# Patient Record
Sex: FEMALE | Race: WHITE | Employment: OTHER | URBAN - METROPOLITAN AREA
[De-identification: names, ages, dates, MRNs, and addresses within clinical notes are randomized per-mention and may not be internally consistent; named-entity substitution may affect disease eponyms.]

---

## 2017-03-17 LAB — LDL-C, EXTERNAL: 63

## 2018-05-15 LAB — HBA1C MFR BLD HPLC: 5.4 %

## 2020-02-07 ENCOUNTER — HOSPITAL ENCOUNTER (OUTPATIENT)
Dept: MAMMOGRAPHY | Age: 70
Discharge: HOME | End: 2020-02-07
Attending: OBSTETRICS & GYNECOLOGY
Payer: COMMERCIAL

## 2020-02-07 DIAGNOSIS — R92.8 ABNORMAL MAMMOGRAM: ICD-10-CM

## 2020-02-07 PROCEDURE — 76642 ULTRASOUND BREAST LIMITED: CPT

## 2020-02-07 PROCEDURE — 77061 BREAST TOMOSYNTHESIS UNI: CPT

## 2020-02-07 PROCEDURE — G0279 TOMOSYNTHESIS, MAMMO: HCPCS

## 2020-07-23 RX ORDER — ATENOLOL 100 MG/1
TABLET ORAL
Qty: 90 TAB | Refills: 1 | Status: SHIPPED | OUTPATIENT
Start: 2020-07-23 | End: 2020-11-17

## 2020-08-14 RX ORDER — LEVOTHYROXINE SODIUM 50 UG/1
TABLET ORAL
Qty: 90 TAB | Refills: 0 | Status: SHIPPED | OUTPATIENT
Start: 2020-08-14 | End: 2020-10-18

## 2020-08-14 RX ORDER — LEVOTHYROXINE SODIUM 50 UG/1
TABLET ORAL
Qty: 30 TAB | Refills: 0 | Status: SHIPPED | OUTPATIENT
Start: 2020-08-14 | End: 2020-11-17

## 2020-08-21 ENCOUNTER — TELEPHONE (OUTPATIENT)
Dept: FAMILY MEDICINE CLINIC | Age: 70
End: 2020-08-21

## 2020-08-24 NOTE — TELEPHONE ENCOUNTER
I made a notation on her chart. Unfortunately, Epic orders all meds as brand and generic name.  She may need to discuss this with her pharmacist.

## 2020-08-25 ENCOUNTER — TELEPHONE (OUTPATIENT)
Dept: FAMILY MEDICINE CLINIC | Age: 70
End: 2020-08-25

## 2020-08-25 NOTE — TELEPHONE ENCOUNTER
Please return patients call she is irritated. She did speak to pharmacy about having brand names only.

## 2020-10-17 VITALS
OXYGEN SATURATION: 99 % | BODY MASS INDEX: 27.4 KG/M2 | WEIGHT: 145.13 LBS | HEART RATE: 76 BPM | HEIGHT: 61 IN | TEMPERATURE: 97.9 F | DIASTOLIC BLOOD PRESSURE: 84 MMHG | SYSTOLIC BLOOD PRESSURE: 128 MMHG

## 2020-10-17 RX ORDER — ESTRADIOL 0.04 MG/D
1 FILM, EXTENDED RELEASE TRANSDERMAL
COMMUNITY
Start: 2017-07-10

## 2020-10-17 RX ORDER — HYDROCHLOROTHIAZIDE 25 MG/1
12.5 TABLET ORAL
COMMUNITY
Start: 2017-08-27 | End: 2020-11-17

## 2020-10-17 RX ORDER — GABAPENTIN 300 MG/1
CAPSULE ORAL
COMMUNITY
End: 2020-10-20

## 2020-10-18 RX ORDER — LEVOTHYROXINE SODIUM 50 UG/1
TABLET ORAL
Qty: 90 TAB | Refills: 0 | Status: SHIPPED | OUTPATIENT
Start: 2020-10-18 | End: 2020-10-20 | Stop reason: SDUPTHER

## 2020-10-20 ENCOUNTER — TELEPHONE (OUTPATIENT)
Dept: FAMILY MEDICINE CLINIC | Age: 70
End: 2020-10-20

## 2020-10-20 ENCOUNTER — OFFICE VISIT (OUTPATIENT)
Dept: FAMILY MEDICINE CLINIC | Age: 70
End: 2020-10-20
Payer: COMMERCIAL

## 2020-10-20 VITALS
BODY MASS INDEX: 27.56 KG/M2 | HEART RATE: 77 BPM | TEMPERATURE: 97 F | DIASTOLIC BLOOD PRESSURE: 72 MMHG | HEIGHT: 61 IN | OXYGEN SATURATION: 98 % | SYSTOLIC BLOOD PRESSURE: 140 MMHG | WEIGHT: 146 LBS

## 2020-10-20 DIAGNOSIS — I10 ESSENTIAL HYPERTENSION: ICD-10-CM

## 2020-10-20 DIAGNOSIS — E03.9 ACQUIRED HYPOTHYROIDISM: ICD-10-CM

## 2020-10-20 DIAGNOSIS — Z11.59 SCREENING FOR VIRAL DISEASE: ICD-10-CM

## 2020-10-20 DIAGNOSIS — E55.9 VITAMIN D DEFICIENCY: ICD-10-CM

## 2020-10-20 DIAGNOSIS — Z23 ENCOUNTER FOR IMMUNIZATION: ICD-10-CM

## 2020-10-20 DIAGNOSIS — E66.3 OVERWEIGHT WITH BODY MASS INDEX (BMI) OF 28 TO 28.9 IN ADULT: ICD-10-CM

## 2020-10-20 DIAGNOSIS — Z13.220 SCREENING FOR HYPERLIPIDEMIA: ICD-10-CM

## 2020-10-20 DIAGNOSIS — Z28.20 VACCINE REFUSED BY PATIENT: ICD-10-CM

## 2020-10-20 DIAGNOSIS — E78.00 PURE HYPERCHOLESTEROLEMIA: ICD-10-CM

## 2020-10-20 DIAGNOSIS — Z00.00 WELLNESS EXAMINATION: Primary | ICD-10-CM

## 2020-10-20 PROCEDURE — 90674 CCIIV4 VAC NO PRSV 0.5 ML IM: CPT | Performed by: NURSE PRACTITIONER

## 2020-10-20 PROCEDURE — 90471 IMMUNIZATION ADMIN: CPT | Performed by: NURSE PRACTITIONER

## 2020-10-20 PROCEDURE — 99397 PER PM REEVAL EST PAT 65+ YR: CPT | Performed by: NURSE PRACTITIONER

## 2020-10-20 RX ORDER — CHROMIUM PICOLINATE 200 MCG
1 TABLET ORAL DAILY
COMMUNITY
End: 2021-03-09

## 2020-10-20 RX ORDER — METHOCARBAMOL 500 MG/1
750 TABLET, FILM COATED ORAL
COMMUNITY
Start: 2020-04-09 | End: 2021-03-09

## 2020-10-20 RX ORDER — ACETAMINOPHEN 500 MG
1 TABLET ORAL AS NEEDED
COMMUNITY
End: 2021-10-21

## 2020-10-20 RX ORDER — CELECOXIB 100 MG/1
100 CAPSULE ORAL 2 TIMES DAILY
COMMUNITY
End: 2021-03-24

## 2020-10-20 NOTE — TELEPHONE ENCOUNTER
Pt called office and stated that this afternoon she was bit and scratched by her pet cat on her arm. She said that she cleaned the wound with alcohol, and put antibiotic ointment and a bandage on the wounds. She said that the cat is up to date on his shots. Advised pt just to watch it and when showers tomorrow change bandage and allow soap and water to run into wound. Advised her to contact office if notices any changes.

## 2020-10-20 NOTE — PATIENT INSTRUCTIONS
Well Visit, Women 48 to 72: Care Instructions Your Care Instructions Physical exams can help you stay healthy. Your doctor has checked your overall health and may have suggested ways to take good care of yourself. He or she also may have recommended tests. At home, you can help prevent illness with healthy eating, regular exercise, and other steps. Follow-up care is a key part of your treatment and safety. Be sure to make and go to all appointments, and call your doctor if you are having problems. It's also a good idea to know your test results and keep a list of the medicines you take. How can you care for yourself at home? · Reach and stay at a healthy weight. This will lower your risk for many problems, such as obesity, diabetes, heart disease, and high blood pressure. · Get at least 30 minutes of exercise on most days of the week. Walking is a good choice. You also may want to do other activities, such as running, swimming, cycling, or playing tennis or team sports. · Do not smoke. Smoking can make health problems worse. If you need help quitting, talk to your doctor about stop-smoking programs and medicines. These can increase your chances of quitting for good. · Protect your skin from too much sun. When you're outdoors from 10 a.m. to 4 p.m., stay in the shade or cover up with clothing and a hat with a wide brim. Wear sunglasses that block UV rays. Even when it's cloudy, put broad-spectrum sunscreen (SPF 30 or higher) on any exposed skin. · See a dentist one or two times a year for checkups and to have your teeth cleaned. · Wear a seat belt in the car. Follow your doctor's advice about when to have certain tests. These tests can spot problems early. · Cholesterol. Your doctor will tell you how often to have this done based on your age, family history, or other things that can increase your risk for heart attack and stroke. · Blood pressure. Have your blood pressure checked during a routine doctor visit. Your doctor will tell you how often to check your blood pressure based on your age, your blood pressure results, and other factors. · Mammogram. Ask your doctor how often you should have a mammogram, which is an X-ray of your breasts. A mammogram can spot breast cancer before it can be felt and when it is easiest to treat. · Pap test and pelvic exam. Ask your doctor how often you should have a Pap test. You may not need to have a Pap test as often as you used to. · Vision. Have your eyes checked every year or two or as often as your doctor suggests. Some experts recommend that you have yearly exams for glaucoma and other age-related eye problems starting at age 48. · Hearing. Tell your doctor if you notice any change in your hearing. You can have tests to find out how well you hear. · Diabetes. Ask your doctor whether you should have tests for diabetes. · Colorectal cancer. Your risk for colorectal cancer gets higher as you get older. Some experts say that adults should start regular screening at age 48 and stop at age 76. Others say to start before age 48 or continue after age 76. Talk with your doctor about your risk and when to start and stop screening. · Thyroid disease. Talk to your doctor about whether to have your thyroid checked as part of a regular physical exam. Women have an increased chance of a thyroid problem. · Osteoporosis. You should begin tests for bone density at age 72. If you are younger than 72, ask your doctor whether you have factors that may increase your risk for this disease. You may want to have this test before age 72. · Heart attack and stroke risk. At least every 4 to 6 years, you should have your risk for heart attack and stroke assessed.  Your doctor uses factors such as your age, blood pressure, cholesterol, and whether you smoke or have diabetes to show what your risk for a heart attack or stroke is over the next 10 years. When should you call for help? Watch closely for changes in your health, and be sure to contact your doctor if you have any problems or symptoms that concern you. Where can you learn more? Go to http://www.gray.com/ Enter A831 in the search box to learn more about \"Well Visit, Women 50 to 72: Care Instructions. \" Current as of: May 27, 2020               Content Version: 12.6 © 8477-4248 [employer]. Care instructions adapted under license by [employer] (which disclaims liability or warranty for this information). If you have questions about a medical condition or this instruction, always ask your healthcare professional. [address] any warranty or liability for your use of this information.

## 2020-10-20 NOTE — PROGRESS NOTES
Subjective  Chief Complaint   Patient presents with    Annual Wellness Visit     HPI:  Desire Mack is a 69 y.o. female. Patient presents for wellness, fasting labs, and management of HTN, hypercholesterolemia, and hypothyroidism. Follows with orthopedics following spinal surgery in February. Continues with PT.   HTN- Atenolol and HCTZ daily. Does not check home BP readings, has a cuff. Limits salt intake and does not stay well hydrated. Exercise is limited due to mobility following spine surgery. Hypercholesterolemia- manages with diet, limits fat/cholesterol. Hypothyroidism- Synthroid daily in am alone with a sip of water. Vit D- continues to take daily supplement.       Immunizations:  Flu: due now  Tetanus:   Shingrix: due  Pneumovax 23: declines  Prevnar 13: declines    LMP: Hysterectomy  HCV screening: No record of previous screening  Pap: scheduled 2021  Mammo: 2020, scheduled 2021  Colon cancer screenin  Dexa: declines today    Alcohol use: rarely  Smoking status: former  Low dose CT scan: not indicated    Moods: expresses frustration with delayed healing and difficulty completing ADLs, denies medication  Diet: not eating a healthy diet  Exercise: limited due to spine surgery  Vision exams: annual, overdue  Dental exams: every 6 months, overdue      Past Medical History:   Diagnosis Date    Acquired hypothyroidism     Essential hypertension     Pure hypercholesterolemia     Vitamin D deficiency      Family History   Problem Relation Age of Onset    Breast Cancer Mother 80    Hypertension Mother     Colon Cancer Father      Social History     Socioeconomic History    Marital status:      Spouse name: Not on file    Number of children: Not on file    Years of education: Not on file    Highest education level: Not on file   Occupational History    Not on file   Social Needs    Financial resource strain: Not on file    Food insecurity     Worry: Not on file Inability: Not on file    Transportation needs     Medical: Not on file     Non-medical: Not on file   Tobacco Use    Smoking status: Former Smoker     Packs/day: 1.00     Years: 35.00     Pack years: 35.00    Smokeless tobacco: Never Used    Tobacco comment: Quit 4/1999   Substance and Sexual Activity    Alcohol use: Yes     Comment: ocassion    Drug use: Never    Sexual activity: Not on file   Lifestyle    Physical activity     Days per week: Not on file     Minutes per session: Not on file    Stress: Not on file   Relationships    Social connections     Talks on phone: Not on file     Gets together: Not on file     Attends Gnosticism service: Not on file     Active member of club or organization: Not on file     Attends meetings of clubs or organizations: Not on file     Relationship status: Not on file    Intimate partner violence     Fear of current or ex partner: Not on file     Emotionally abused: Not on file     Physically abused: Not on file     Forced sexual activity: Not on file   Other Topics Concern    Not on file   Social History Narrative    Not on file     Current Outpatient Medications on File Prior to Visit   Medication Sig Dispense Refill    methocarbamoL (ROBAXIN) 500 mg tablet Take 500 mg by mouth every eight (8) hours as needed.  celecoxib (CeleBREX) 100 mg capsule Take 100 mg by mouth two (2) times a day.  cholecalciferol (VITAMIN D3) (2,000 UNITS /50 MCG) cap capsule Take 1 Cap by mouth two (2) times a day.  Calcium-Cholecalciferol, D3, 600 mg(1,500mg) -400 unit cap Take 1 Tab by mouth daily.  estradioL (VIVELLE) 0.0375 mg/24 hr Minivelle 0.0375 mg/24 hr transdermal patch      hydroCHLOROthiazide (HYDRODIURIL) 25 mg tablet Take 12.5 mg by mouth.       synthroid 50 mcg tablet TAKE 1 TABLET BY MOUTH EVERY DAY (OVERDUE FOR LABS) 30 Tab 0    atenoloL (TENORMIN) 100 mg tablet TAKE 1 TABLET BY MOUTH EVERY DAY 90 Tab 1    [DISCONTINUED] synthroid 50 mcg tablet TAKE 1 TABLET BY MOUTH EVERY DAY 90 Tab 0    [DISCONTINUED] gabapentin (NEURONTIN) 300 mg capsule gabapentin 300 mg capsule       No current facility-administered medications on file prior to visit. Allergies   Allergen Reactions    Nitrofurantoin Nausea and Vomiting     Review of Systems   Constitutional: Negative for chills, fever and weight loss. HENT: Negative for congestion, ear pain, hearing loss, sinus pain and sore throat. Denies difficulty swallowing. Eyes: Negative for blurred vision. Respiratory: Negative for cough, shortness of breath and wheezing. Cardiovascular: Negative for chest pain, palpitations, claudication and leg swelling. Gastrointestinal: Negative for abdominal pain, constipation, diarrhea and heartburn. Genitourinary: Negative for dysuria. Musculoskeletal: Positive for back pain and joint pain. Negative for myalgias. Neurological: Positive for weakness (generalized). Negative for dizziness, tingling and headaches. Psychiatric/Behavioral: Negative for depression. The patient is not nervous/anxious. Objective  Physical Exam  Vitals signs and nursing note reviewed. Constitutional:       General: She is not in acute distress. Appearance: Normal appearance. She is overweight. HENT:      Head: Normocephalic. Right Ear: Tympanic membrane normal.      Left Ear: Tympanic membrane normal.      Nose: Nose normal.      Mouth/Throat:      Mouth: Mucous membranes are moist.      Pharynx: Oropharynx is clear. No posterior oropharyngeal erythema. Eyes:      Extraocular Movements: Extraocular movements intact. Neck:      Musculoskeletal: Normal range of motion and neck supple. Thyroid: No thyroid mass, thyromegaly or thyroid tenderness. Cardiovascular:      Rate and Rhythm: Normal rate and regular rhythm. Heart sounds: Normal heart sounds.    Pulmonary:      Effort: Pulmonary effort is normal.      Breath sounds: Normal breath sounds. Abdominal:      General: Bowel sounds are normal.      Palpations: Abdomen is soft. There is no hepatomegaly, splenomegaly or mass. Tenderness: There is no abdominal tenderness. Musculoskeletal: Normal range of motion. Right lower leg: No edema. Left lower leg: No edema. Comments: Ambulates with walker   Lymphadenopathy:      Cervical: No cervical adenopathy. Upper Body:      Right upper body: No supraclavicular adenopathy. Left upper body: No supraclavicular adenopathy. Skin:     General: Skin is warm and dry. Neurological:      General: No focal deficit present. Mental Status: She is alert and oriented to person, place, and time. Psychiatric:         Mood and Affect: Mood normal.         Behavior: Behavior normal.         Thought Content: Thought content normal.         Judgment: Judgment normal.          Assessment & Plan      ICD-10-CM ICD-9-CM    1. Wellness examination  Z00.00 V70.0    2. Screening for hyperlipidemia  Z13.220 V77.91 CBC WITH AUTOMATED DIFF      LIPID PANEL      METABOLIC PANEL, COMPREHENSIVE   3. Screening for viral disease  Z11.59 V73.99 HEPATITIS C AB   4. Overweight with body mass index (BMI) of 28 to 28.9 in adult  E66.3 278.02     Z68.28 V85.24    5. Encounter for immunization  Z23 V03.89 INFLUENZA, INJECTABLE, MDCK, PRESERVATIVE FREE, QUADRIVALENT   6. Vaccine refused by patient  Z28.20 V64.09    7. Essential hypertension  I10 401.9    8. Pure hypercholesterolemia  E78.00 272.0    9. Acquired hypothyroidism  E03.9 244.9 TSH 3RD GENERATION   10. Vitamin D deficiency  E55.9 268.9 VITAMIN D, 25 HYDROXY     Diagnoses and all orders for this visit:    1. Wellness examination  We are getting patient up-to-date on preventative measures as listed. 2. Screening for hyperlipidemia  Fasting labs.    -     CBC WITH AUTOMATED DIFF  -     LIPID PANEL  -     METABOLIC PANEL, COMPREHENSIVE    3. Screening for viral disease  -     HEPATITIS C AB    4. Overweight with body mass index (BMI) of 28 to 28.9 in adult  Increase regular exercise as tolerated. Eat a healthy diet low in salt, rich in lean meats, fresh fruits and vegetables, and whole wheats and grains. 5. Encounter for immunization  Advised to receive Shingrix vaccine from pharmacy.  -     INFLUENZA, INJECTABLE, MDCK, PRESERVATIVE FREE, QUADRIVALENT    6. Vaccine refused by patient  Declines pneumonia and tetanus vaccines. 7. Essential hypertension  He is below goal in the office today. Encouraged to check readings weekly 1 to 2 hours after taking medication and after sitting quietly for 5 minutes with both feet flat on floor and arm at heart level. Call if readings are consistently greater than 150/90 on either number. 8. Pure hypercholesterolemia  Checking annual labs today. Increase regular exercise as tolerated and limit fat/cholesterol in diet. 9. Acquired hypothyroidism  On labs today. Take med daily at the same time on an empty stomach, at least 30 minutes before or two hours after a meal, and separate from other meds including OTC, minerals, and vitamins by at least 2 hours. -     TSH 3RD GENERATION    10. Vitamin D deficiency  Continues to take daily supplement. -     VITAMIN D, 25 HYDROXY      Follow-up and Dispositions    · Return in about 6 months (around 4/20/2021) for follow up, hypertension, nonfasting, VV ok.            Joey Corona NP

## 2020-10-21 LAB
25(OH)D3+25(OH)D2 SERPL-MCNC: 38.2 NG/ML (ref 30–100)
ALBUMIN SERPL-MCNC: 4.5 G/DL (ref 3.7–4.7)
ALBUMIN/GLOB SERPL: 2.1 {RATIO} (ref 1.2–2.2)
ALP SERPL-CCNC: 83 IU/L (ref 39–117)
ALT SERPL-CCNC: 16 IU/L (ref 0–32)
AST SERPL-CCNC: 23 IU/L (ref 0–40)
BASOPHILS # BLD AUTO: 0 X10E3/UL (ref 0–0.2)
BASOPHILS NFR BLD AUTO: 0 %
BILIRUB SERPL-MCNC: 0.5 MG/DL (ref 0–1.2)
BUN SERPL-MCNC: 18 MG/DL (ref 8–27)
BUN/CREAT SERPL: 32 (ref 12–28)
CALCIUM SERPL-MCNC: 9.6 MG/DL (ref 8.7–10.3)
CHLORIDE SERPL-SCNC: 100 MMOL/L (ref 96–106)
CHOLEST SERPL-MCNC: 177 MG/DL (ref 100–199)
CO2 SERPL-SCNC: 27 MMOL/L (ref 20–29)
CREAT SERPL-MCNC: 0.57 MG/DL (ref 0.57–1)
EOSINOPHIL # BLD AUTO: 0.1 X10E3/UL (ref 0–0.4)
EOSINOPHIL NFR BLD AUTO: 1 %
ERYTHROCYTE [DISTWIDTH] IN BLOOD BY AUTOMATED COUNT: 13.3 % (ref 11.7–15.4)
GLOBULIN SER CALC-MCNC: 2.1 G/DL (ref 1.5–4.5)
GLUCOSE SERPL-MCNC: 98 MG/DL (ref 65–99)
HCT VFR BLD AUTO: 40.8 % (ref 34–46.6)
HCV AB S/CO SERPL IA: <0.1 S/CO RATIO (ref 0–0.9)
HDLC SERPL-MCNC: 88 MG/DL
HGB BLD-MCNC: 13.8 G/DL (ref 11.1–15.9)
IMM GRANULOCYTES # BLD AUTO: 0 X10E3/UL (ref 0–0.1)
IMM GRANULOCYTES NFR BLD AUTO: 0 %
LDLC SERPL CALC-MCNC: 72 MG/DL (ref 0–99)
LYMPHOCYTES # BLD AUTO: 1.4 X10E3/UL (ref 0.7–3.1)
LYMPHOCYTES NFR BLD AUTO: 19 %
MCH RBC QN AUTO: 28.5 PG (ref 26.6–33)
MCHC RBC AUTO-ENTMCNC: 33.8 G/DL (ref 31.5–35.7)
MCV RBC AUTO: 84 FL (ref 79–97)
MONOCYTES # BLD AUTO: 0.3 X10E3/UL (ref 0.1–0.9)
MONOCYTES NFR BLD AUTO: 4 %
NEUTROPHILS # BLD AUTO: 5.3 X10E3/UL (ref 1.4–7)
NEUTROPHILS NFR BLD AUTO: 76 %
PLATELET # BLD AUTO: 221 X10E3/UL (ref 150–450)
POTASSIUM SERPL-SCNC: 3.9 MMOL/L (ref 3.5–5.2)
PROT SERPL-MCNC: 6.6 G/DL (ref 6–8.5)
RBC # BLD AUTO: 4.84 X10E6/UL (ref 3.77–5.28)
SODIUM SERPL-SCNC: 141 MMOL/L (ref 134–144)
TRIGL SERPL-MCNC: 95 MG/DL (ref 0–149)
TSH SERPL DL<=0.005 MIU/L-ACNC: 2.56 UIU/ML (ref 0.45–4.5)
VLDLC SERPL CALC-MCNC: 17 MG/DL (ref 5–40)
WBC # BLD AUTO: 7.1 X10E3/UL (ref 3.4–10.8)

## 2020-10-21 NOTE — PROGRESS NOTES
No blood cell abnormalities including anemia. Normal glucose, kidney and liver function. Lipids are all below goal.  Thyroid and vitamin D levels are normal.  Hepatitis C screening negative.

## 2020-11-17 RX ORDER — HYDROCHLOROTHIAZIDE 25 MG/1
TABLET ORAL
Qty: 45 TAB | Refills: 1 | Status: SHIPPED | OUTPATIENT
Start: 2020-11-17 | End: 2021-05-20

## 2021-01-13 ENCOUNTER — OFFICE VISIT (OUTPATIENT)
Dept: ORTHOPEDIC SURGERY | Age: 71
End: 2021-01-13
Payer: COMMERCIAL

## 2021-01-13 VITALS
HEART RATE: 65 BPM | SYSTOLIC BLOOD PRESSURE: 161 MMHG | TEMPERATURE: 98.2 F | OXYGEN SATURATION: 100 % | DIASTOLIC BLOOD PRESSURE: 83 MMHG | WEIGHT: 153 LBS | BODY MASS INDEX: 28.89 KG/M2 | HEIGHT: 61 IN

## 2021-01-13 DIAGNOSIS — M16.0 BILATERAL PRIMARY OSTEOARTHRITIS OF HIP: Primary | ICD-10-CM

## 2021-01-13 PROCEDURE — 99204 OFFICE O/P NEW MOD 45 MIN: CPT | Performed by: ORTHOPAEDIC SURGERY

## 2021-01-13 NOTE — PROGRESS NOTES
1/13/2021    Chief Complaint: Bilateral hip pain    HPI: This is a 69 y. o.female who complains of left hip pain which is activity dependent. The patient has had activity dependent pain for years. The patient has tried activity modification, she is in physical therapy, injections have not been attempted. The pain is in the groin and down the thighs, it is severe in intensity. The patient fears falling, walks with a limp, and feels as though all nonoperative management has failed. The patient denies any numbness, weakness, tingling, fevers, chills, nausea, vomiting, fevers, chills, nausea, vomiting. Past Medical History:   Diagnosis Date    Acquired hypothyroidism     Essential hypertension     Pure hypercholesterolemia     Vitamin D deficiency        Past Surgical History:   Procedure Laterality Date    HX BREAST BIOPSY Left     Papiloma 1991    HX COLONOSCOPY  12/14/2017    HX COLONOSCOPY  11/01/2012    HX GYN      HX HYSTERECTOMY  06/01/1997    HX ORTHOPAEDIC  02/27/2020    laminectomy and bilateral foraminotomy       Current Outpatient Medications on File Prior to Visit   Medication Sig Dispense Refill    atenoloL (TENORMIN) 100 mg tablet TAKE 1 TABLET BY MOUTH EVERY DAY 90 Tab 1    synthroid 50 mcg tablet TAKE 1 TABLET BY MOUTH EVERY DAY 90 Tab 2    hydroCHLOROthiazide (HYDRODIURIL) 25 mg tablet TAKE 1/2 TABLET BY MOUTH ONCE A DAY 45 Tab 1    methocarbamoL (ROBAXIN) 500 mg tablet Take 750 mg by mouth every eight (8) hours as needed.  celecoxib (CeleBREX) 100 mg capsule Take 100 mg by mouth two (2) times a day.  cholecalciferol (VITAMIN D3) (2,000 UNITS /50 MCG) cap capsule Take 1 Cap by mouth two (2) times a day.  Calcium-Cholecalciferol, D3, 600 mg(1,500mg) -400 unit cap Take 1 Tab by mouth daily.  estradioL (VIVELLE) 0.0375 mg/24 hr Minivelle 0.0375 mg/24 hr transdermal patch       No current facility-administered medications on file prior to visit.         Allergies Allergen Reactions    Nitrofurantoin Nausea and Vomiting       Family History   Problem Relation Age of Onset    Breast Cancer Mother 80    Hypertension Mother     Colon Cancer Father        Social History     Socioeconomic History    Marital status:      Spouse name: Not on file    Number of children: Not on file    Years of education: Not on file    Highest education level: Not on file   Tobacco Use    Smoking status: Former Smoker     Packs/day: 1.00     Years: 35.00     Pack years: 35.00    Smokeless tobacco: Never Used    Tobacco comment: Quit 4/1999   Substance and Sexual Activity    Alcohol use: Yes     Comment: ocassion    Drug use: Never         Review of Systems:       General: Denies headache, lethargy, fever, weight loss  Ears/Nose/Throat: Denies ear discharge, drainage, nosebleeds, hoarse voice, dental problems  Cardiovascular: Denies chest pain, shortness of breath  Lungs: Denies chest pain, breathing problems, wheezing, pneumonia  Stomach: Denies stomach pain, heartburn, constipation, irritable bowel  Skin: Denies rash, sores, open wounds  Musculoskeletal: Admits to joint pain and swelling, this pain is sharp and causes difficulty walking. This pain is in the groin on the left side, it is severe, made better by rest.   This pain causes a limp, admits to stiffness of the joint, decreased mobility, and severe difficulty doing normal activities of daily living secondary to the pain. Genitourinary: Denies dysuria, hematuria, polyuria  Gastrointestinal: Denies constipation, obstipation, diarrhea  Neurological: Denies changes in sight, smell, hearing, taste, seizures. Denies loss of consciousness.   Psychiatric: Denies depression, sleep pattern changes, anxiety, change in personality  Endocrine: Denies mood swings, heat or cold intolerance  Hematologic/Lymphatic: Denies anemia, purpura, petechia  Allergic/Immunologic: Denies swelling of throat, pain or swelling at lymph nodes      Physical Examination:      General: AOX3, no apparent distress  Psychiatric: mood and affect appropriate  Lungs: clear to auscultation bilaterally  Heart: regular rate and rhythm  Abdomen: no guarding  Head: normocephalic, atraumatic  Skin: No significant abnormalities, good turgor  Sensation intact to light touch: L1-S1 dermatomes  Muscular exam: 5/5 strength in all major muscle groups unless noted in specialty exam.    Extremities      Left upper extremity: Full active and passive range of motion without pain, deformity, no open wound, strength 5/5 in all major muscle groups. Right upper extremity: Full active and passive range of motion without pain, deformity, no open wound, strength 5/5 in all major muscle groups. Right lower extremity:No deformity is noted. Circumduction of the hip causes significant pain in the groin, reproductive of the chief complaint. Range of motion is limited, with a positive impingement sign. FUNMI test causes some pain in the groin. Gait is antalgic. Slight tenderness to palpation on the lateral aspect of the hip. Sensation is intact to light touch in the L1-S1 dermatomes. Skin is intact about the hip. Hip flexion and abduction strength is 4+/5, hip extension and knee extension as well as tibialis anterior and EHL/GCS are 5/5 strength. Left lower extremity:  No deformity is noted. Circumduction of the hip causes significant pain in the groin, reproductive of the chief complaint. Range of motion is limited, with a positive impingement sign. FUNMI test causes some pain in the groin. Gait is antalgic. Slight tenderness to palpation on the lateral aspect of the hip. Sensation is intact to light touch in the L1-S1 dermatomes. Skin is intact about the hip. Hip flexion and abduction strength is 4+/5, hip extension and knee extension as well as tibialis anterior and EHL/GCS are 5/5 strength. Leg length is 4 cm short on the left.     Diagnostics:    Pertinent Xrays:  Pelvis and hip xrays indicate severe osteoarthritis of the left hip. There is obliteration of the superior half of the femoral head, likely due to an avascular component. There are osteophytes at the femoral neck and head, as well as subchondral sclerosis of the acetabular rim. Xrays are available of the right hip, they indicate severe bone on bone osteoarthritis of the femoroacetabular joint, no significant other findings, no other osseus abnormalities, fractures, or dislocations. Assessment: Bilateral hip osteoarthritis    Plan: This patient and I did have a long discussion regarding the treatment options. Nonoperative management was discussed at length, continued pain control, physical therapy, injections, ambulatory aids, and weight loss. I did speak with the patient specifically that there are always some nonoperative options, and that surgery would be elective on their part. We did discuss the risks of surgery which include but are not limited to infection, nerve or blood vessel damage, femoral, lateral femoral cutaneous nerve injury, sciatic nerve injury, failure of fixation, failure of any possible implant, need for reoperation, postoperative pain and swelling, intra-or postoperative fracture, postoperative dislocation, leg length inequality, need for reoperation, implant failure, death, disability, organ dysfunction, wound healing issues, DVT, PE, and the need for further procedures. . We specifically discussed that the left leg cannot be fully lengthened without risk to her nerves, and a nerve palsy could follow such a procedure. Additionally, we may not be able to complete a bilateral procedure given how complex the left is. I will order a CT for evaluation prior to surgeryThe patient did freely state their understanding and satisfaction with our discussion. We will proceed with a bilateral total hip arthroplasty after  medical clearances.   The patient was counseled at length about the risks of dennise Covid-19 during their perioperative period and any recovery window from their procedure. The patient was made aware that dennise Covid-19  may worsen their prognosis for recovering from their procedure and lend to a higher morbidity and/or mortality risk. All material risks, benefits, and reasonable alternatives including postponing the procedure were discussed. The patient does  wish to proceed with the procedure at this time. Time in consultation and decision makin minutes    Ms. Oral Hutson has a reminder for a \"due or due soon\" health maintenance. I have asked that she contact her primary care provider for follow-up on this health maintenance.

## 2021-01-13 NOTE — PROGRESS NOTES
Identified pt with two pt identifiers (name and ). Reviewed chart in preparation for visit and have obtained necessary documentation. Martínez Hernandez is a 69 y.o. female  Chief Complaint   Patient presents with    Hip Pain     Bilateral Hip     Visit Vitals  BP (!) 161/83 (BP 1 Location: Right arm, BP Patient Position: Sitting)   Pulse 65   Temp 98.2 °F (36.8 °C) (Tympanic)   Ht 5' 0.5\" (1.537 m)   Wt 153 lb (69.4 kg)   SpO2 100%   BMI 29.39 kg/m²     1. Have you been to the ER, urgent care clinic since your last visit? Hospitalized since your last visit? No    2. Have you seen or consulted any other health care providers outside of the [address] [place] [address] since your last visit? Include any pap smears or colon screening.  No

## 2021-01-27 ENCOUNTER — TELEPHONE (OUTPATIENT)
Dept: ORTHOPEDIC SURGERY | Age: 71
End: 2021-01-27

## 2021-01-27 NOTE — TELEPHONE ENCOUNTER
Patient has some questions about COVID testing here before she has surgery. She is requesting a call back.

## 2021-01-27 NOTE — TELEPHONE ENCOUNTER
Called pt back to talk about COVID testing. There was no answer and a VM was left for pt to c/b with any questions.

## 2021-02-02 ENCOUNTER — TELEPHONE (OUTPATIENT)
Dept: FAMILY MEDICINE CLINIC | Age: 71
End: 2021-02-02

## 2021-02-02 NOTE — TELEPHONE ENCOUNTER
Wanting to know if we got the paper work from Dr Ish De for her hip replacement? They want Chalo David to clear her for her surgery second week of March. Please let patient know if we can clear her for this surgery.  She said you can leave a message if she's not available

## 2021-02-03 ENCOUNTER — HOSPITAL ENCOUNTER (OUTPATIENT)
Dept: CT IMAGING | Age: 71
Discharge: HOME | End: 2021-02-03
Attending: ORTHOPAEDIC SURGERY
Payer: COMMERCIAL

## 2021-02-03 DIAGNOSIS — M16.0 BILATERAL PRIMARY OSTEOARTHRITIS OF HIP: ICD-10-CM

## 2021-02-03 PROCEDURE — 72192 CT PELVIS W/O DYE: CPT

## 2021-02-19 DIAGNOSIS — M16.0 BILATERAL PRIMARY OSTEOARTHRITIS OF HIP: Primary | ICD-10-CM

## 2021-02-20 ENCOUNTER — HOSPITAL ENCOUNTER (OUTPATIENT)
Dept: LAB | Age: 71
Discharge: HOME | End: 2021-02-20
Payer: COMMERCIAL

## 2021-02-20 ENCOUNTER — TRANSCRIBE ORDER (OUTPATIENT)
Dept: EMERGENCY DEPT | Age: 71
End: 2021-02-20

## 2021-02-20 DIAGNOSIS — Z01.812 PRE-PROCEDURAL LABORATORY EXAMINATIONS: ICD-10-CM

## 2021-02-20 DIAGNOSIS — Z01.812 PRE-PROCEDURAL LABORATORY EXAMINATIONS: Primary | ICD-10-CM

## 2021-02-20 PROCEDURE — U0005 INFEC AGEN DETEC AMPLI PROBE: HCPCS

## 2021-02-22 LAB — SARS-COV-2, COV2NT: NOT DETECTED

## 2021-02-24 ENCOUNTER — ANESTHESIA EVENT (OUTPATIENT)
Dept: ENDOSCOPY | Age: 71
End: 2021-02-24
Payer: COMMERCIAL

## 2021-02-24 ENCOUNTER — ANESTHESIA (OUTPATIENT)
Dept: ENDOSCOPY | Age: 71
End: 2021-02-24
Payer: COMMERCIAL

## 2021-02-24 ENCOUNTER — HOSPITAL ENCOUNTER (OUTPATIENT)
Age: 71
Setting detail: OUTPATIENT SURGERY
Discharge: HOME | End: 2021-02-24
Attending: INTERNAL MEDICINE | Admitting: INTERNAL MEDICINE
Payer: COMMERCIAL

## 2021-02-24 VITALS
BODY MASS INDEX: 25.33 KG/M2 | HEART RATE: 64 BPM | OXYGEN SATURATION: 96 % | DIASTOLIC BLOOD PRESSURE: 75 MMHG | SYSTOLIC BLOOD PRESSURE: 172 MMHG | WEIGHT: 152 LBS | RESPIRATION RATE: 12 BRPM | HEIGHT: 65 IN | TEMPERATURE: 97.4 F

## 2021-02-24 LAB
H PYLORI FROM TISSUE: NEGATIVE
KIT LOT NO., HCLOLOT: NORMAL
NEGATIVE CONTROL: NEGATIVE
POSITIVE CONTROL: POSITIVE

## 2021-02-24 PROCEDURE — 74011000250 HC RX REV CODE- 250: Performed by: NURSE ANESTHETIST, CERTIFIED REGISTERED

## 2021-02-24 PROCEDURE — 74011250636 HC RX REV CODE- 250/636: Performed by: NURSE ANESTHETIST, CERTIFIED REGISTERED

## 2021-02-24 PROCEDURE — 77030021593 HC FCPS BIOP ENDOSC BSC -A: Performed by: INTERNAL MEDICINE

## 2021-02-24 PROCEDURE — 74011250636 HC RX REV CODE- 250/636: Performed by: INTERNAL MEDICINE

## 2021-02-24 PROCEDURE — 76040000019: Performed by: INTERNAL MEDICINE

## 2021-02-24 PROCEDURE — 87077 CULTURE AEROBIC IDENTIFY: CPT | Performed by: INTERNAL MEDICINE

## 2021-02-24 PROCEDURE — 76060000031 HC ANESTHESIA FIRST 0.5 HR: Performed by: INTERNAL MEDICINE

## 2021-02-24 PROCEDURE — 2709999900 HC NON-CHARGEABLE SUPPLY: Performed by: INTERNAL MEDICINE

## 2021-02-24 RX ORDER — ATROPINE SULFATE 0.1 MG/ML
0.5 INJECTION INTRAVENOUS
Status: DISCONTINUED | OUTPATIENT
Start: 2021-02-24 | End: 2021-02-24 | Stop reason: HOSPADM

## 2021-02-24 RX ORDER — MIDAZOLAM HYDROCHLORIDE 1 MG/ML
INJECTION, SOLUTION INTRAMUSCULAR; INTRAVENOUS AS NEEDED
Status: DISCONTINUED | OUTPATIENT
Start: 2021-02-24 | End: 2021-02-24 | Stop reason: HOSPADM

## 2021-02-24 RX ORDER — MIDAZOLAM HYDROCHLORIDE 1 MG/ML
.25-5 INJECTION, SOLUTION INTRAMUSCULAR; INTRAVENOUS
Status: DISCONTINUED | OUTPATIENT
Start: 2021-02-24 | End: 2021-02-24 | Stop reason: HOSPADM

## 2021-02-24 RX ORDER — LIDOCAINE HYDROCHLORIDE 20 MG/ML
INJECTION, SOLUTION EPIDURAL; INFILTRATION; INTRACAUDAL; PERINEURAL AS NEEDED
Status: DISCONTINUED | OUTPATIENT
Start: 2021-02-24 | End: 2021-02-24 | Stop reason: HOSPADM

## 2021-02-24 RX ORDER — EPINEPHRINE 0.1 MG/ML
1 INJECTION INTRACARDIAC; INTRAVENOUS
Status: DISCONTINUED | OUTPATIENT
Start: 2021-02-24 | End: 2021-02-24 | Stop reason: HOSPADM

## 2021-02-24 RX ORDER — SODIUM CHLORIDE 9 MG/ML
50 INJECTION, SOLUTION INTRAVENOUS CONTINUOUS
Status: DISCONTINUED | OUTPATIENT
Start: 2021-02-24 | End: 2021-02-24 | Stop reason: HOSPADM

## 2021-02-24 RX ORDER — SODIUM CHLORIDE 9 MG/ML
INJECTION, SOLUTION INTRAVENOUS
Status: DISCONTINUED | OUTPATIENT
Start: 2021-02-24 | End: 2021-02-24 | Stop reason: HOSPADM

## 2021-02-24 RX ORDER — NALOXONE HYDROCHLORIDE 0.4 MG/ML
0.4 INJECTION, SOLUTION INTRAMUSCULAR; INTRAVENOUS; SUBCUTANEOUS
Status: DISCONTINUED | OUTPATIENT
Start: 2021-02-24 | End: 2021-02-24 | Stop reason: HOSPADM

## 2021-02-24 RX ORDER — FLUMAZENIL 0.1 MG/ML
0.2 INJECTION INTRAVENOUS
Status: DISCONTINUED | OUTPATIENT
Start: 2021-02-24 | End: 2021-02-24 | Stop reason: HOSPADM

## 2021-02-24 RX ORDER — DEXTROMETHORPHAN/PSEUDOEPHED 2.5-7.5/.8
1.2 DROPS ORAL
Status: DISCONTINUED | OUTPATIENT
Start: 2021-02-24 | End: 2021-02-24 | Stop reason: HOSPADM

## 2021-02-24 RX ORDER — PROPOFOL 10 MG/ML
INJECTION, EMULSION INTRAVENOUS AS NEEDED
Status: DISCONTINUED | OUTPATIENT
Start: 2021-02-24 | End: 2021-02-24 | Stop reason: HOSPADM

## 2021-02-24 RX ORDER — FENTANYL CITRATE 50 UG/ML
100 INJECTION, SOLUTION INTRAMUSCULAR; INTRAVENOUS
Status: DISCONTINUED | OUTPATIENT
Start: 2021-02-24 | End: 2021-02-24 | Stop reason: HOSPADM

## 2021-02-24 RX ORDER — PROPOFOL 10 MG/ML
INJECTION, EMULSION INTRAVENOUS
Status: DISCONTINUED | OUTPATIENT
Start: 2021-02-24 | End: 2021-02-24 | Stop reason: HOSPADM

## 2021-02-24 RX ADMIN — LIDOCAINE HYDROCHLORIDE 80 MG: 20 INJECTION, SOLUTION INTRAVENOUS at 13:55

## 2021-02-24 RX ADMIN — PROPOFOL 100 MCG/KG/MIN: 10 INJECTION, EMULSION INTRAVENOUS at 13:55

## 2021-02-24 RX ADMIN — PROPOFOL 50 MG: 10 INJECTION, EMULSION INTRAVENOUS at 13:55

## 2021-02-24 RX ADMIN — FENTANYL CITRATE 50 MCG: 0.05 INJECTION, SOLUTION INTRAMUSCULAR; INTRAVENOUS at 13:50

## 2021-02-24 RX ADMIN — SODIUM CHLORIDE: 9 INJECTION, SOLUTION INTRAVENOUS at 13:45

## 2021-02-24 RX ADMIN — MIDAZOLAM HYDROCHLORIDE 2 MG: 2 INJECTION, SOLUTION INTRAMUSCULAR; INTRAVENOUS at 13:53

## 2021-02-24 NOTE — ANESTHESIA POSTPROCEDURE EVALUATION
Procedure(s):  COLONOSCOPY  ESOPHAGOGASTRODUODENOSCOPY (EGD)  ESOPHAGOGASTRODUODENAL (EGD) BIOPSY. MAC    Anesthesia Post Evaluation      Multimodal analgesia: multimodal analgesia not used between 6 hours prior to anesthesia start to PACU discharge  Patient location during evaluation: PACU  Patient participation: complete - patient participated  Level of consciousness: awake  Pain management: adequate  Airway patency: patent  Anesthetic complications: no  Cardiovascular status: acceptable, blood pressure returned to baseline and hemodynamically stable  Respiratory status: acceptable  Hydration status: acceptable  Post anesthesia nausea and vomiting:  controlled  Final Post Anesthesia Temperature Assessment:  Normothermia (36.0-37.5 degrees C)      INITIAL Post-op Vital signs:   Vitals Value Taken Time   /75 02/24/21 1446   Temp 36.3 °C (97.4 °F) 02/24/21 1425   Pulse 66 02/24/21 1449   Resp 17 02/24/21 1449   SpO2 100 % 02/24/21 1448   Vitals shown include unvalidated device data.

## 2021-02-24 NOTE — ANESTHESIA PREPROCEDURE EVALUATION
Relevant Problems   CARDIOVASCULAR   (+) Essential hypertension      ENDOCRINE   (+) Acquired hypothyroidism       Anesthetic History   No history of anesthetic complications            Review of Systems / Medical History  Patient summary reviewed and pertinent labs reviewed    Pulmonary  Within defined limits                 Neuro/Psych   Within defined limits           Cardiovascular    Hypertension: well controlled              Exercise tolerance: >4 METS     GI/Hepatic/Renal  Within defined limits              Endo/Other      Hypothyroidism: well controlled  Arthritis     Other Findings            Physical Exam    Airway  Mallampati: I  TM Distance: 4 - 6 cm  Neck ROM: normal range of motion   Mouth opening: Normal     Cardiovascular    Rhythm: regular  Rate: normal         Dental    Dentition: Upper dentition intact, Lower dentition intact and Caps/crowns     Pulmonary  Breath sounds clear to auscultation               Abdominal         Other Findings            Anesthetic Plan    ASA: 2  Anesthesia type: MAC          Induction: Intravenous  Anesthetic plan and risks discussed with: Patient

## 2021-02-24 NOTE — H&P
Patient Name: Elmer Mchugh   Account #: [id number]    Gender: Female    (age): 1950 (70)       2021    Referring Physician:     MD Edgar Youngblood [address]  [phone] (phone)  [phone] (fax)     [address] [facility]  [phone] (phone)  [phone] (fax)     Delfina [address]  [phone] (phone)  [phone] (fax)        Chief Complaint: positive hemoccult           History of Present Illness:          Periodic constipation; limited mobility from orthopedic complaints. Told needs bilateral hip replacement. Currently on Celebrex daily; no otc nsaids, no visible blood loss. ?        Routine testing has occult intestinal blood loss identified    Past Medical History      Medical Conditions: High blood pressure  Osteoarthritis  Pinched Sciatica  Spinal Stenosis  Thyroid problems   Surgical Procedures: Laminectomy, 2020  Bilateral Foraminotomy, 2020  Hysterectomy, 1997  Other major surgeries:, Urethral Dilatation   Dx Studies: Colonoscopy,   MRI, 2018, 2019 lumbar   Medications: atenolol 100 mg Take 1 tablet by mouth once a day as directed  Wenceslao Mag Zinc Plus D3 333 mg-133 unit-133 mg-5 mg Take 1 tablet by mouth once a day as directed  celecoxib 100 mg Take 1 capsule by mouth every twelve hours as directed  collagen, hydr (bovine) (bulk) 100% Take 2 scoops dissolved in water once a day as directed  Glucosamine Chondroitin 550-30-1 mg Take 3-5 capsule by mouth once a day as directed  hydrochlorothiazide 25 mg Take 1/2 tablet by mouth once a day as directed  methocarbamol 500 mg Take 1 tablet by mouth once a day as directed  methocarbamol 750 mg Take 1 tablet by mouth once a day as directed  Minivelle 0.0375 mg/24 hr Apply 1 patch to skin twice a week as directed for 90 days  omega 3-dha-epa-fish oil 300 mg (120 mg- 180mg)-1,000 mg Take 2 capsule by mouth once a day as directed  Synthroid 50 mcg Take 1 tablet by mouth once a day as directed  Women's Daily Formula 18 mg iron-400 mcg-500 mg Ca Take 1 tablet by mouth once a day as directed   Allergies: macrodantin   Immunizations: Flu vaccine, 10/1/2020      Social History      Alcohol: None   Tobacco: Former smokerCigarettes 1 pack a day, quit 1999. Drugs: None   Exercise: Exercise 3 or more times a week. Caffeine: Daily. Family History No history of Celiac sprue, Colon Polyps, Esophageal Cancer, Esophogeal Cancer, GI Cancers, IBD (Crohn's or UC), Inflammatory bowel disease (Crohn's or Ulcerative Colitis), Liver disease, Pancreatic Cancer, Stomach Cancer  Father: Diagnosed with colon cancer;       Review of Systems:   Cardiovascular: Denies chest pain, dyspnea with exercise, irregular heart beat, orthopnea, palpitations. Constitutional: Denies fatigue, fever, loss of appetite, malaise, weight gain. ENMT: Denies difficulty swallowing. Gastrointestinal: Presents suffers from change in bowel habits, constipation. Denies abdominal pain, abdominal swelling, diarrhea, Bloating/gas, heartburn, jaundice, nausea. Genitourinary: Denies dark urine, decrease in urine flow, dysuria. Musculoskeletal: Presents suffers from arthritis, back pain, joint deformity. .    Neurological: Denies dizziness, fainting, frequent headaches, migraine. Psychiatric: Denies anxiety, depression, difficulty sleeping, hallucinations, nervousness. Respiratory: Denies asthma, cough, dyspnea, excessive sputum. Vital Signs: See nursing notes     Physical Exam:       Comfortable    HEENT not icteric    Lungs clear to percussion and auscultation    Cardiac regular rate and rhythm without murmur or gallop    Abdomen without tenderness, distention, mass lesion    Extremities no edema    Impressions: Nonspecific abnormal findings in stool contents?  ?         Plan: I've discussed EGD, colonoscopy possible biopsy, polypectomy, cautery, injection, alternatives, complications including but not limited to pain, cardiopulmonary event, bleeding, perforation requiring additional blood transfusion or operative repair; all questions answered.

## 2021-02-24 NOTE — PROGRESS NOTES

## 2021-02-24 NOTE — DISCHARGE INSTRUCTIONS
Mathieu Laird  [id number]  1950    DISCHARGE INSTRUCTIONS  Discomfort:  Redness at IV site- apply warm compress to area; if redness or soreness persist- contact your physician. There may be a slight amount of blood passed from the rectum. Gaseous discomfort - walking, belching will help relieve any discomfort. You may not operate a vehicle for 12 hours. You may not engage in an occupation involving machinery or appliances for rest of today. You may not drink alcoholic beverages for at least 12 hours. Avoid making any critical decisions for at least 24 hours. DIET:   High fiber diet. - however -  remember your colon is empty and a heavy meal will produce gas. Avoid these foods:  vegetables, fried / greasy foods, carbonated drinks for today. Medications:                Resume usual medications today   ACTIVITY:  You may resume your normal daily activities it is recommended that you spend the remainder of the day resting -  avoid any strenuous activity. CALL M.D. ANY SIGN OF:   Increasing pain, nausea, vomiting  Abdominal distension (swelling)  New increased bleeding (oral or rectal)  Fever (chills)  Pain in chest area  Bloody discharge from nose or mouth  Shortness of breath     Follow-up Instructions:  Call Dr. Francisco Javier Foss if you have any questions or problems.   colonic diverticulosis        DISCHARGE SUMMARY from Nurse    The following personal items collected during your admission are returned to you:   Dental Appliance: Dental Appliances: None  Vision: Visual Aid: Contacts  Hearing Aid:    Jewelry:    Clothing:    Other Valuables:    Valuables sent to safe:

## 2021-02-24 NOTE — ROUTINE PROCESS
Krysta Johansen 1950 
[id number] Situation: 
Verbal report received from: E Owino-Le 
Procedure: Procedure(s): 
COLONOSCOPY 
ESOPHAGOGASTRODUODENOSCOPY (EGD) ESOPHAGOGASTRODUODENAL (EGD) BIOPSY Background: 
 
Preoperative diagnosis: NONSPECIFIC ABNORMAL FINDINGS IN STOOL CONTENTS/FAMILY HX COLON CANCER Postoperative diagnosis: 1.- Normal EGD 
2.- Diverticulosis :  Dr. Niru Pollard Assistant(s): Endoscopy Technician-1: Yoon Underwood Endoscopy RN-1: Brian Sanchez RN Specimens: * No specimens in log * H. Pylori  yes Assessment: 
Intra-procedure medications Versed 2 mg Fentanyl 50 mcg Anesthesia gave intra-procedure sedation and medications, see anesthesia flow sheet yes Intravenous fluids: NS@ Flaquita Kida Vital signs stable Abdominal assessment: round and soft Recommendation: 
Discharge patient per MD order. Family or Friend Permission to share finding with family or friend yes Endoscopy discharge instructions have been reviewed and given to patient. The patient verbalized understanding and acceptance of instructions. Dr. Niru Pollard discussed with patient procedure findings and next steps.

## 2021-02-24 NOTE — PROCEDURES
Marvin Rankin M.D. 2021    Esophagogastroduodenoscopy (EGD) Colonoscopy Procedure Note  Bhupendra Campbell  : 1950  [employer] Medical Record Number: [id number]      Indications:    Occult blood in stool with possible UGI origin  Referring Physician:  Ras Bowers NP  Anesthesia/Sedation: see nursing notes  Endoscopist:  Dr. Pedro Manuel  Assistants: None  Permit:  The indications, risks, benefits and alternatives were reviewed with the patient or their decision maker who was provided an opportunity to ask questions and all questions were answered. The specific risks of esophagogastroduodenoscopy with conscious sedation were reviewed, including but not limited to anesthetic complication, bleeding, adverse drug reaction, missed lesion, infection, IV site reactions, and intestinal perforation which would lead to the need for surgical repair. Alternatives to EGD including radiographic imaging, observation without testing, or laboratory testing were reviewed as well as the limitations of those alternatives discussed. After considering the options and having all their questions answered, the patient or their decision maker provided both verbal and written consent to proceed. Procedure in Detail:  After obtaining informed consent, positioning of the patient in the left lateral decubitus position, and conduction of a pre-procedure pause or \"time out\" the endoscope was introduced into the mouth and advanced to the duodenum. A careful inspection was made, and findings or interventions are described below.     Findings:   Esophagus:normal  Stomach: normal   Duodenum/jejunum: normal    Complications/estimated blood loss: none    Therapies:  Sheila test biopsy    Specimens: sheila test only    Implants:none           Endoscopist:  Dr. Pedro Manuel  Assistants: None  Complications:  None  Estimate Blood Loss: None    Colonoscopy is to follow    Permit:  The indications, risks, benefits and alternatives were reviewed with the patient or their decision maker who was provided an opportunity to ask questions and all questions were answered. The specific risks of colonoscopy with conscious sedation were reviewed, including but not limited to anesthetic complication, bleeding, adverse drug reaction, missed lesion, infection, IV site reactions, and intestinal perforation which would lead to the need for surgical repair. Alternatives to colonoscopy including radiographic imaging, observation without testing, or laboratory testing were reviewed including the limitations of those alternatives. After considering the options and having all their questions answered, the patient or their decision maker provided both verbal and written consent to proceed. Procedure in Detail:  After obtaining informed consent, positioning of the patient in the left lateral decubitus position, and conduction of a pre-procedure pause or \"time out\" the endoscope was introduced into the anus and advanced to the terminal ileum. The quality of the colonic preparation was good. A careful inspection was made as the colonoscope was withdrawn, findings and interventions are described below. Appendiceal orifice photographed    Findings:   no mucosal lesion appreciated      - Diverticulosis    Specimens:    none    Implants: none    Complications:   None; patient tolerated the procedure well. Estimated blood loss: none    Impression:  colonic diverticulosis    Recommendations:     - For colon cancer screening in this average-risk patient, colonoscopy may be repeated in 10 years. - proceed orthopedic surgery and any necessary anti coagulation    Thank you for entrusting me with this patient's care. Please do not hesitate to contact me with any questions or if I can be of assistance with any of your other patients' GI needs.     Signed By: Chiquita Metzger Gerda Christiansen MD                        February 24, 2021

## 2021-03-02 ENCOUNTER — OFFICE VISIT (OUTPATIENT)
Dept: FAMILY MEDICINE CLINIC | Age: 71
End: 2021-03-02
Payer: COMMERCIAL

## 2021-03-02 ENCOUNTER — VIRTUAL VISIT (OUTPATIENT)
Dept: ORTHOPEDIC SURGERY | Age: 71
End: 2021-03-02

## 2021-03-02 VITALS
TEMPERATURE: 97.1 F | HEIGHT: 65 IN | HEART RATE: 72 BPM | BODY MASS INDEX: 26.55 KG/M2 | WEIGHT: 159.38 LBS | OXYGEN SATURATION: 98 % | DIASTOLIC BLOOD PRESSURE: 84 MMHG | SYSTOLIC BLOOD PRESSURE: 142 MMHG

## 2021-03-02 DIAGNOSIS — M16.0 BILATERAL PRIMARY OSTEOARTHRITIS OF HIP: Primary | ICD-10-CM

## 2021-03-02 DIAGNOSIS — I10 ESSENTIAL HYPERTENSION: Primary | ICD-10-CM

## 2021-03-02 DIAGNOSIS — M25.559 HIP PAIN: ICD-10-CM

## 2021-03-02 DIAGNOSIS — E03.9 ACQUIRED HYPOTHYROIDISM: ICD-10-CM

## 2021-03-02 DIAGNOSIS — Z01.818 PREOPERATIVE EXAMINATION, UNSPECIFIED: ICD-10-CM

## 2021-03-02 PROCEDURE — 99443 PR PHYS/QHP TELEPHONE EVALUATION 21-30 MIN: CPT | Performed by: ORTHOPAEDIC SURGERY

## 2021-03-02 PROCEDURE — 99214 OFFICE O/P EST MOD 30 MIN: CPT | Performed by: NURSE PRACTITIONER

## 2021-03-02 RX ORDER — LANOLIN ALCOHOL/MO/W.PET/CERES
3-5 CREAM (GRAM) TOPICAL DAILY
COMMUNITY

## 2021-03-02 NOTE — PROGRESS NOTES
Subjective  Chief Complaint   Patient presents with    Pre-op Exam     HPI:  Abel Agosto is a 66 y.o. female. she is here today for a preoperative evaluation. Procedure: bilateral hip replacement  Surgeon: Dr. Madison Kat  Date of procedure: 3/22, PAT scheduled 3/9/2021, COVID testing 3/18/2021  Anesthesia type: general  History of complications to anesthesia: No  History of allergy to latex: No  Personal or family history of malignant hyperthermia: No  Anticipated discharge needs: 1-3 day admission followed by rehab    Chronic disease management: Medications reviewed, taking as prescribed with no known side effects. Does not check home BP readings. Synthroid daily in am alone with glass of water. No concerns about thyroid.        Past Medical History:   Diagnosis Date    Acquired hypothyroidism     Essential hypertension     History of mammogram 01/06/2021    History of Papanicolaou smear of cervix 01/2021    Pure hypercholesterolemia     Vitamin D deficiency      Family History   Problem Relation Age of Onset    Breast Cancer Mother 80    Hypertension Mother     Colon Cancer Father     Cancer Brother      Social History     Socioeconomic History    Marital status:      Spouse name: Not on file    Number of children: Not on file    Years of education: Not on file    Highest education level: Not on file   Occupational History    Not on file   Social Needs    Financial resource strain: Not on file    Food insecurity     Worry: Not on file     Inability: Not on file    Transportation needs     Medical: Not on file     Non-medical: Not on file   Tobacco Use    Smoking status: Former Smoker     Packs/day: 1.00     Years: 35.00     Pack years: 35.00    Smokeless tobacco: Never Used    Tobacco comment: Quit 4/1999   Substance and Sexual Activity    Alcohol use: Yes     Comment: ocassion    Drug use: Never    Sexual activity: Not on file   Lifestyle    Physical activity     Days per week: Not on file     Minutes per session: Not on file    Stress: Not on file   Relationships    Social connections     Talks on phone: Not on file     Gets together: Not on file     Attends [employer] service: Not on file     Active member of club or organization: Not on file     Attends meetings of clubs or organizations: Not on file     Relationship status: Not on file    Intimate partner violence     Fear of current or ex partner: Not on file     Emotionally abused: Not on file     Physically abused: Not on file     Forced sexual activity: Not on file   Other Topics Concern    Not on file   Social History Narrative    Not on file     Current Outpatient Medications on File Prior to Visit   Medication Sig Dispense Refill    fish oil-omega-3 fatty acids 300-500 mg cap Take  by mouth.  glucosamine-chondroitin (ARTHX) 500-400 mg cap Take 1 Cap by mouth daily.  [DISCONTINUED] OTHER CBD cream hips, knees, chins and left ankle      atenoloL (TENORMIN) 100 mg tablet TAKE 1 TABLET BY MOUTH EVERY DAY 90 Tab 1    synthroid 50 mcg tablet TAKE 1 TABLET BY MOUTH EVERY DAY 90 Tab 2    hydroCHLOROthiazide (HYDRODIURIL) 25 mg tablet TAKE 1/2 TABLET BY MOUTH ONCE A DAY 45 Tab 1    methocarbamoL (ROBAXIN) 500 mg tablet Take 750 mg by mouth every eight (8) hours as needed.  celecoxib (CeleBREX) 100 mg capsule Take 100 mg by mouth two (2) times a day.  cholecalciferol (VITAMIN D3) (2,000 UNITS /50 MCG) cap capsule Take 1 Cap by mouth two (2) times a day.  Calcium-Cholecalciferol, D3, 600 mg(1,500mg) -400 unit cap Take 1 Tab by mouth daily.  estradioL (VIVELLE) 0.0375 mg/24 hr Minivelle 0.0375 mg/24 hr transdermal patch       No current facility-administered medications on file prior to visit. Allergies   Allergen Reactions    Nitrofurantoin Nausea and Vomiting     Review of Systems   Constitutional: Negative for chills, fever and weight loss.    HENT: Negative for congestion, ear pain, hearing loss, sinus pain and sore throat. Denies difficulty swallowing. Eyes: Negative for blurred vision. Respiratory: Negative for cough, shortness of breath and wheezing. Cardiovascular: Negative for chest pain, palpitations, claudication and leg swelling. Gastrointestinal: Negative for abdominal pain, constipation, diarrhea and heartburn. Genitourinary: Negative for dysuria. Musculoskeletal: Positive for joint pain (bilateral hips). Negative for myalgias. Neurological: Negative for dizziness, tingling, weakness and headaches. Psychiatric/Behavioral: Negative for depression. The patient is not nervous/anxious. Objective  Physical Exam  Vitals signs and nursing note reviewed. Constitutional:       General: She is not in acute distress. Appearance: Normal appearance. She is overweight. HENT:      Head: Normocephalic. Mouth/Throat:      Pharynx: No posterior oropharyngeal erythema. Eyes:      Extraocular Movements: Extraocular movements intact. Neck:      Musculoskeletal: Normal range of motion and neck supple. Thyroid: No thyroid mass, thyromegaly or thyroid tenderness. Cardiovascular:      Rate and Rhythm: Normal rate and regular rhythm. Heart sounds: Normal heart sounds. Pulmonary:      Effort: Pulmonary effort is normal.      Breath sounds: Normal breath sounds. Abdominal:      General: Bowel sounds are normal.      Palpations: Abdomen is soft. There is no mass. Tenderness: There is no abdominal tenderness. Musculoskeletal: Normal range of motion. Right lower leg: No edema. Left lower leg: No edema. Comments: Ambulates with walker   Lymphadenopathy:      Cervical: No cervical adenopathy. Upper Body:      Right upper body: No supraclavicular adenopathy. Left upper body: No supraclavicular adenopathy. Skin:     General: Skin is warm and dry. Neurological:      General: No focal deficit present.       Mental Status: She is alert and oriented to person, place, and time. Psychiatric:         Mood and Affect: Mood normal.         Behavior: Behavior normal.         Thought Content: Thought content normal.         Judgment: Judgment normal.          Assessment & Plan      ICD-10-CM ICD-9-CM    1. Essential hypertension  I10 401.9    2. Acquired hypothyroidism  E03.9 244.9    3. Preoperative examination, unspecified  Z01.818 V72.84    4. Hip pain  M25.559 719.45      Diagnoses and all orders for this visit:    1. Essential hypertension  BP is below goal in the office today. Discussed the importance of home BP monitoring. Advised to check a few home readings before surgery and call if consistently greater than 150/90 on either number per JNC 8 guidelines. 2. Acquired hypothyroidism  Thyroid function normal when last checked 10/20/2020. 3. Preoperative examination, unspecified  Patient is optimized for surgery. Paperwork will be completed and faxed as requested. 4. Hip pain  Scheduled for bilateral hip replacement surgery per HPI.           Gail Martinez NP

## 2021-03-03 NOTE — PROGRESS NOTES
3/2/2021      CC: Bilateral hip pain    HPI:      This is a 66y.o. year old female who is scheduled for bilateral total hip arthroplasty in the coming weeks, she is presenting with multiple questions for review of the process of hip replacement surgery. PMH:  Past Medical History:   Diagnosis Date    Acquired hypothyroidism     Essential hypertension     History of mammogram 01/06/2021    History of Papanicolaou smear of cervix 01/2021    Pure hypercholesterolemia     Vitamin D deficiency        PSxHx:  Past Surgical History:   Procedure Laterality Date    COLONOSCOPY N/A 2/24/2021    COLONOSCOPY and EGD performed by Linda Rome MD at [address] [facility] HX BREAST BIOPSY Left     Papiloma 1991    HX COLONOSCOPY  12/14/2017    HX COLONOSCOPY  11/01/2012    HX GYN      HX HYSTERECTOMY  06/01/1997    HX ORTHOPAEDIC  02/27/2020    laminectomy and bilateral foraminotomy    HX UROLOGICAL      Uretheral dilatation x 3       Meds:    Current Outpatient Medications:     fish oil-omega-3 fatty acids 300-500 mg cap, Take  by mouth., Disp: , Rfl:     glucosamine-chondroitin (ARTHX) 500-400 mg cap, Take 1 Cap by mouth daily. , Disp: , Rfl:     atenoloL (TENORMIN) 100 mg tablet, TAKE 1 TABLET BY MOUTH EVERY DAY, Disp: 90 Tab, Rfl: 1    synthroid 50 mcg tablet, TAKE 1 TABLET BY MOUTH EVERY DAY, Disp: 90 Tab, Rfl: 2    hydroCHLOROthiazide (HYDRODIURIL) 25 mg tablet, TAKE 1/2 TABLET BY MOUTH ONCE A DAY, Disp: 45 Tab, Rfl: 1    methocarbamoL (ROBAXIN) 500 mg tablet, Take 750 mg by mouth every eight (8) hours as needed. , Disp: , Rfl:     celecoxib (CeleBREX) 100 mg capsule, Take 100 mg by mouth two (2) times a day., Disp: , Rfl:     cholecalciferol (VITAMIN D3) (2,000 UNITS /50 MCG) cap capsule, Take 1 Cap by mouth two (2) times a day., Disp: , Rfl:     Calcium-Cholecalciferol, D3, 600 mg(1,500mg) -400 unit cap, Take 1 Tab by mouth daily. , Disp: , Rfl:     estradioL (VIVELLE) 0.0375 mg/24 hr, [place] 0.0375 mg/24 hr transdermal patch, Disp: , Rfl:     All:  Allergies   Allergen Reactions    Nitrofurantoin Nausea and Vomiting       Social Hx:  Social History     Socioeconomic History    Marital status:      Spouse name: Not on file    Number of children: Not on file    Years of education: Not on file    Highest education level: Not on file   Tobacco Use    Smoking status: Former Smoker     Packs/day: 1.00     Years: 35.00     Pack years: 35.00    Smokeless tobacco: Never Used    Tobacco comment: Quit 4/1999   Substance and Sexual Activity    Alcohol use: Yes     Comment: ocassion    Drug use: Never       Family Hx:  Family History   Problem Relation Age of Onset    Breast Cancer Mother 80    Hypertension Mother     Colon Cancer Father     Cancer Brother          Review of Systems:       General: Denies headache, lethargy, fever, weight loss  Ears/Nose/Throat: Denies ear discharge, drainage, nosebleeds, hoarse voice, dental problems  Cardiovascular: Denies chest pain, shortness of breath  Lungs: Denies chest pain, breathing problems, wheezing, pneumonia  Stomach: Denies stomach pain, heartburn, constipation, irritable bowel  Skin: Denies rash, sores, open wounds  Musculoskeletal: Bilateral hip pain  Genitourinary: Denies dysuria, hematuria, polyuria  Gastrointestinal: Denies constipation, obstipation, diarrhea  Neurological: Denies changes in sight, smell, hearing, taste, seizures. Denies loss of consciousness. Psychiatric: Denies depression, sleep pattern changes, anxiety, change in personality  Endocrine: Denies mood swings, heat or cold intolerance  Hematologic/Lymphatic: Denies anemia, purpura, petechia  Allergic/Immunologic: Denies swelling of throat, pain or swelling at lymph nodes      Physical Examination:    There were no vitals taken for this visit.      General: AOX3, no apparent distress  Psychiatric: mood and affect appropriate        Diagnostics:    Pertinent Diagnostics: None today    Assessment: Bilateral hip osteoarthritis, plan for bilateral total hip arthroplasty  Plan: This patient has the above-mentioned issue, we did spend quite a bit of time discussing any and all questions that she had, we discussed that her recovery is potentially variable, we will have to work on her potential discharge to rehab versus home as well as the resources that we will do into this. We did spend quite a bit of time speaking about expectations, the fact that she has been not really ambulatory for about 2 years and he will take quite a bit of time to rebuild her endurance as well as the rest of the risk as discussed previously. She stated her understanding satisfaction and she will follow-up with me for surgery. I was in the office while conducting this encounter. Consent:  She and/or her healthcare decision maker is aware that this patient-initiated Telehealth encounter is a billable service, with coverage as determined by her insurance carrier. She is aware that she may receive a bill and has provided verbal consent to proceed: Yes    This virtual visit was conducted telephone encounter only. -  I affirm this is a Patient Initiated Episode with an Established Patient who has not had a related appointment within my department in the past 7 days or scheduled within the next 24 hours. Note: this encounter is not billable if this call serves to triage the patient into an appointment for the relevant concern. Total Time: minutes: 21-30 minutes. Ms. Kwame Villar has a reminder for a \"due or due soon\" health maintenance. I have asked that she contact her primary care provider for follow-up on this health maintenance.

## 2021-03-09 ENCOUNTER — HOSPITAL ENCOUNTER (OUTPATIENT)
Dept: PREADMISSION TESTING | Age: 71
Discharge: HOME | End: 2021-03-09
Attending: ORTHOPAEDIC SURGERY
Payer: COMMERCIAL

## 2021-03-09 ENCOUNTER — TELEPHONE (OUTPATIENT)
Dept: FAMILY MEDICINE CLINIC | Age: 71
End: 2021-03-09

## 2021-03-09 ENCOUNTER — TELEPHONE (OUTPATIENT)
Dept: ORTHOPEDIC SURGERY | Age: 71
End: 2021-03-09

## 2021-03-09 VITALS
WEIGHT: 158.73 LBS | DIASTOLIC BLOOD PRESSURE: 68 MMHG | TEMPERATURE: 98.3 F | OXYGEN SATURATION: 100 % | HEIGHT: 65 IN | RESPIRATION RATE: 16 BRPM | SYSTOLIC BLOOD PRESSURE: 158 MMHG | HEART RATE: 68 BPM | BODY MASS INDEX: 26.45 KG/M2

## 2021-03-09 LAB
ABO + RH BLD: NORMAL
ALBUMIN SERPL-MCNC: 3.6 G/DL (ref 3.5–5)
ALBUMIN/GLOB SERPL: 1 {RATIO} (ref 1.1–2.2)
ALP SERPL-CCNC: 70 U/L (ref 45–117)
ALT SERPL-CCNC: 25 U/L (ref 12–78)
ANION GAP SERPL CALC-SCNC: 3 MMOL/L (ref 5–15)
APPEARANCE UR: CLEAR
AST SERPL-CCNC: 18 U/L (ref 15–37)
ATRIAL RATE: 39 BPM
BACTERIA URNS QL MICRO: NEGATIVE /HPF
BILIRUB SERPL-MCNC: 0.6 MG/DL (ref 0.2–1)
BILIRUB UR QL: NEGATIVE
BLOOD GROUP ANTIBODIES SERPL: NORMAL
BUN SERPL-MCNC: 25 MG/DL (ref 6–20)
BUN/CREAT SERPL: 37 (ref 12–20)
CALCIUM SERPL-MCNC: 9.3 MG/DL (ref 8.5–10.1)
CALCULATED R AXIS, ECG10: 61 DEGREES
CALCULATED T AXIS, ECG11: 39 DEGREES
CHLORIDE SERPL-SCNC: 102 MMOL/L (ref 97–108)
CO2 SERPL-SCNC: 30 MMOL/L (ref 21–32)
COLOR UR: ABNORMAL
CREAT SERPL-MCNC: 0.68 MG/DL (ref 0.55–1.02)
DIAGNOSIS, 93000: NORMAL
EPITH CASTS URNS QL MICRO: ABNORMAL /LPF
ERYTHROCYTE [DISTWIDTH] IN BLOOD BY AUTOMATED COUNT: 13.3 % (ref 11.5–14.5)
EST. AVERAGE GLUCOSE BLD GHB EST-MCNC: 108 MG/DL
GLOBULIN SER CALC-MCNC: 3.5 G/DL (ref 2–4)
GLUCOSE SERPL-MCNC: 98 MG/DL (ref 65–100)
GLUCOSE UR STRIP.AUTO-MCNC: NEGATIVE MG/DL
HBA1C MFR BLD: 5.4 % (ref 4–5.6)
HCT VFR BLD AUTO: 39.5 % (ref 35–47)
HGB BLD-MCNC: 12.9 G/DL (ref 11.5–16)
HGB UR QL STRIP: ABNORMAL
INR PPP: 1 (ref 0.9–1.1)
KETONES UR QL STRIP.AUTO: NEGATIVE MG/DL
LEUKOCYTE ESTERASE UR QL STRIP.AUTO: NEGATIVE
MCH RBC QN AUTO: 28.1 PG (ref 26–34)
MCHC RBC AUTO-ENTMCNC: 32.7 G/DL (ref 30–36.5)
MCV RBC AUTO: 86.1 FL (ref 80–99)
NITRITE UR QL STRIP.AUTO: NEGATIVE
NRBC # BLD: 0 K/UL (ref 0–0.01)
NRBC BLD-RTO: 0 PER 100 WBC
PH UR STRIP: 6.5 [PH] (ref 5–8)
PLATELET # BLD AUTO: 195 K/UL (ref 150–400)
PMV BLD AUTO: 10.8 FL (ref 8.9–12.9)
POTASSIUM SERPL-SCNC: 4.3 MMOL/L (ref 3.5–5.1)
PROT SERPL-MCNC: 7.1 G/DL (ref 6.4–8.2)
PROT UR STRIP-MCNC: NEGATIVE MG/DL
PROTHROMBIN TIME: 10.3 SEC (ref 9–11.1)
Q-T INTERVAL, ECG07: 432 MS
QRS DURATION, ECG06: 116 MS
QTC CALCULATION (BEZET), ECG08: 442 MS
RBC # BLD AUTO: 4.59 M/UL (ref 3.8–5.2)
RBC #/AREA URNS HPF: ABNORMAL /HPF (ref 0–5)
SODIUM SERPL-SCNC: 135 MMOL/L (ref 136–145)
SP GR UR REFRACTOMETRY: 1.01 (ref 1–1.03)
SPECIMEN EXP DATE BLD: NORMAL
UA: UC IF INDICATED,UAUC: ABNORMAL
UROBILINOGEN UR QL STRIP.AUTO: 0.2 EU/DL (ref 0.2–1)
VENTRICULAR RATE, ECG03: 63 BPM
WBC # BLD AUTO: 7.5 K/UL (ref 3.6–11)
WBC URNS QL MICRO: ABNORMAL /HPF (ref 0–4)

## 2021-03-09 PROCEDURE — 83036 HEMOGLOBIN GLYCOSYLATED A1C: CPT

## 2021-03-09 PROCEDURE — 81001 URINALYSIS AUTO W/SCOPE: CPT

## 2021-03-09 PROCEDURE — 97116 GAIT TRAINING THERAPY: CPT

## 2021-03-09 PROCEDURE — 85610 PROTHROMBIN TIME: CPT

## 2021-03-09 PROCEDURE — 85027 COMPLETE CBC AUTOMATED: CPT

## 2021-03-09 PROCEDURE — 93005 ELECTROCARDIOGRAM TRACING: CPT

## 2021-03-09 PROCEDURE — 97161 PT EVAL LOW COMPLEX 20 MIN: CPT

## 2021-03-09 PROCEDURE — 86901 BLOOD TYPING SEROLOGIC RH(D): CPT

## 2021-03-09 PROCEDURE — 80053 COMPREHEN METABOLIC PANEL: CPT

## 2021-03-09 PROCEDURE — 36415 COLL VENOUS BLD VENIPUNCTURE: CPT

## 2021-03-09 RX ORDER — METHOCARBAMOL 750 MG/1
750 TABLET, FILM COATED ORAL
COMMUNITY
End: 2021-03-24

## 2021-03-09 RX ORDER — METHOCARBAMOL 500 MG/1
500 TABLET, FILM COATED ORAL
COMMUNITY
End: 2021-03-24

## 2021-03-09 NOTE — TELEPHONE ENCOUNTER
Pt would like to know which vitamins, supplements and medications she should stop before surgery and when?   Please advise

## 2021-03-09 NOTE — TELEPHONE ENCOUNTER
Pt stated that on her records that it states she has high cholesterol and want to speak with nurse. Pt gives permission to leave voice message.

## 2021-03-09 NOTE — TELEPHONE ENCOUNTER
Left message for pt to call back. Looking back at pt's old chart looks like dx was enter by previous provider on 11/16/2015.

## 2021-03-09 NOTE — PROGRESS NOTES
[facility]  Physical Therapy Pre-surgery evaluation  [address] [place] [address]    PHYSICAL THERAPY PRE THR SURGERY EVALUATION  Patient: Yodit Mchugh (71 y.o. female)  Date: 3/9/2021  Primary Diagnosis: PAT  Procedure(s) (LRB):  BILATERAL TOTAL HIP ARTHROPLASTY (Bilateral)     Precautions: anterior hip       ASSESSMENT :  Based on the objective data described below, the patient presents with impaired gait, balance, pain, and overall high level functional mobility due to end stage degenerative joint disease in the bilateral  hip.   Discussed anticipated disposition to home with possible discharge within a 1 to 2 day time frame post-surgery. Patient  in agreement. Patient lives alone and does not have a caregiver, reports that she discussed with Dr. Tesfaye going to rehab after surgery.    GOALS: (Goals have been discussed and agreed upon with patient.)  DISCHARGE GOALS: Time Frame: 1 DAY  1. Patient will demonstrate increased strength, range of motion, and pain control via a home exercise program in order to minimize functional deficits in preparation for their upcoming surgery. This will be achieved by using education, demonstration and through the use of an informational handout including a home exercise program.  REHABILITATION POTENTIAL FOR STATED GOALS: Good     RECOMMENDATIONS AND PLANNED INTERVENTIONS: (Benefits and precautions of physical therapy have been discussed with the patient.)  · Home Exercise Program  TREATMENT PLAN EFFECTIVE DATES: 3/9/2021 TO 3/9/2021  FREQUENCY/DURATION: Patient to continue to perform home exercise program at least twice daily until her surgery.     SUBJECTIVE:   Patient stated “I can't go home until I can do everything for myself.”    OBJECTIVE DATA SUMMARY:   HISTORY:    Past Medical History:   Diagnosis Date   • Acquired hypothyroidism    • Arthritis    • Essential hypertension    • History of mammogram 01/06/2021   • History of Papanicolaou  smear of cervix 01/2021    Pure hypercholesterolemia     patient denies hx    Vitamin D deficiency      Past Surgical History:   Procedure Laterality Date    COLONOSCOPY N/A 2/24/2021    COLONOSCOPY and EGD performed by Romelia Carrion MD at [address] [facility] HX BREAST BIOPSY Left     Papiloma 1991    HX COLONOSCOPY  12/14/2017    HX COLONOSCOPY  11/01/2012    HX HYSTERECTOMY  06/01/1997    HX ORTHOPAEDIC  02/27/2020    laminectomy and bilateral foraminotomy    HX UROLOGICAL      Uretheral dilatation x 3     Prior Level of Function/Home Situation: Patient lives alone in a single story home, does not drive and orders groceries to be delivered. Patient ambulates only short household distances with RW, pain limited. Personal factors and/or comorbidities impacting plan of care: Patient has leg length discrepancy and wears a built up shoe on left, may be improved with THR. Patient has h/o lumbar laminectomy last year. Patient []   does  [x]   does not state signs/symptoms of shortness of breath/dyspnea on exertion/respiratory distress. Home Situation  Home Environment: Private residence  # Steps to Enter: 5  Rails to Enter: Yes  Hand Rails : Bilateral  One/Two Story Residence: One story  Living Alone: Yes  Support Systems: Friends \ neighbors  Patient Expects to be Discharged to[de-identified] Rehabilitation facility  Current DME Used/Available at Home: Walker, rolling  Tub or Shower Type: Shower      EXAMINATION/PRESENTATION/DECISION MAKING:     ADLs (Current Functional Status):    Bathing/Showering:   [] Independent  [] Requires Assistance from Someone  [x] Sponge Bath Only   Ambulation:  [] Independent  [x] Walk Indoors Only  [] Walk Outdoors  [x] Use Assistive Device  [] Use Wheelchair Only     Dressing:  [x] Independent    Requires Assistance from Someone for:  [] Sock/Shoes  [] Pants  [] Everything   Household Activities:  [] Routine house and yard work  [x] Light Housework Only  [] None       Critical Behavior: Strength:    Strength: Generally decreased, functional                    Tone & Sensation:   Tone: Normal                              Range Of Motion:  AROM: Generally decreased, functional                       Coordination:  Coordination: Within functional limits    Functional Mobility:  Transfers:  Sit to Stand: Modified independent  Stand to Sit: Modified independent                       Balance:   Sitting: Intact  Standing: Impaired  Standing - Static: Constant support, Good  Standing - Dynamic : Constant support, Good  Ambulation/Gait Training:  Distance (ft): 20 Feet (ft)  Assistive Device: Walker, rolling           Gait Abnormalities: Decreased step clearance        Base of Support: Widened     Speed/Ella: Pace decreased (<100 feet/min)  Step Length: Left shortened, Right shortened                    Therapeutic Exercises:   The patient was educated in, has demonstrated, and has received written instructions to complete for their home exercise program per total hip replacement protocol. Functional Measure:  Timed up and go:    Timed Get Up And Go Test: 62       < than 10 seconds=Normal  Greater then 13.5 seconds (in elderly)=Increased fall risk   Cassidy HUIZAR, Emelyn Morelos. Predicting the probability for falls in community dwelling older adults using the Timed Up and Go Test. Phys Ther. 2000;80:896-903. Pain:  Pain Scale 1: Numeric (0 - 10)  Pain Intensity 1: 7  Pain Location 1: Hip  Pain Orientation 1: Left;Right  Pain Description 1: Aching       Activity Tolerance:   Fair, pain limited  Patient []   does  []   does not demonstrate signs/symptoms of shortness of breath/dyspnea on exertion/respiratory distress. COMMUNICATION/EDUCATION:   The patient was educated on:  [x]         Early post operative mobility is imperative to achieve a patient's desired outcomes and to restore biological function.   [x]         Post operative hip precautions may/may not be applicable. These precautions are based on the patient's physician and the procedure(s) performed. [x]         Anterior hip precautions including:  ·       No hip extension  ·       No external rotation  ·       No crossing of the legs/midline    []         Posterior hip precautions including:  ·       No hip flexion >90 degrees  ·       No internal rotation  ·       No crossing of the legs/midline    The patients plan of care was discussed as follows:   [x]         The patient verbalized understanding of her plan in preparation for their upcoming surgery  []         The patient's  was present for this session  [x]        The patient reports that he/she does not have a  identified at this time  []         The  verbalized understanding of the education regarding the patient's upcoming surgery  [x]         Patient/family agree to work toward stated goals and plan of care. []         Patient understands intent and goals of therapy, but is neutral about his/her participation. []         Patient is unable to participate in goal setting and plan of care.       Thank you for this referral.  Hamlet Michelle, PT   Time Calculation: 23 mins

## 2021-03-09 NOTE — PERIOP NOTES
Orthopedic and Spine Patients: Instructions on When You Can   Eat or Drink Before Surgery      You have been provided 2 pre-surgery drinks received at your pre-admission testing appointment.  Night before surgery:  o You should drink one bottle of the  pre-surgery drink at bedtime. No food after midnight!  Day of Surgery:  o Complete 2nd bottle of the pre-surgery drink 1 hour prior to arrival at hospital.  For questions call Pre-Admission Testing at [phone]. They are available from 8:00am-5:00pm, Monday through Friday.

## 2021-03-09 NOTE — PERIOP NOTES
[facility]  Joint/Spine Preoperative Instructions        Surgery Date 3/22/21          Time of Arrival 0545 AM CONTACT # [id number]  COVID TEST SCHEDULED FOR Thursday,  3/18/21- COME BETWEEN 7 AM AND 1145 AM    1. On the day of your surgery, please report to the Surgical Services Registration Desk and sign in at your designated time. The Surgery Center is located to the right of the Emergency Room. 2. You must have someone with you to drive you home. You should not drive a car for 24 hours following surgery. Please make arrangements for a friend or family member to stay with you for the first 24 hours after your surgery. 3. No food after midnight . Medications morning of surgery should be taken with a sip of water. Please follow pre-surgery drink instructions that were given at your Pre Admission Testing appointment. 4. We recommend you do not drink any alcoholic beverages for 24 hours before and after your surgery. 5. Contact your surgeons office for instructions on the following medications: non-steroidal anti-inflammatory drugs (i.e. Advil, Aleve), vitamins, and supplements. (Some surgeons will want you to stop these medications prior to surgery and others may allow you to take them)  **If you are currently taking Plavix, Coumadin, Aspirin and/or other blood-thinning agents, contact your surgeon for instructions. ** Your surgeon will partner with the physician prescribing these medications to determine if it is safe to stop or if you need to continue taking. Please do not stop taking these medications without instructions from your surgeon    6. Wear comfortable clothes. Wear glasses instead of contacts. Do not bring any money or jewelry. Please bring picture ID, insurance card, and any prearranged co-payment or hospital payment. Do not wear make-up, particularly mascara the morning of your surgery.   Do not wear nail polish, particularly if you are having foot /hand surgery. Wear your hair loose or down, no ponytails, buns, elizabeth pins or clips. All body piercings must be removed. Please shower with antibacterial soap for three consecutive days before and on the morning of surgery, but do not apply any lotions, powders or deodorants after the shower on the day of surgery. Please use a fresh towels after each shower. Please sleep in clean clothes and change bed linens the night before surgery. Please do not shave for 48 hours prior to surgery. Shaving of the face is acceptable. 7. You should understand that if you do not follow these instructions your surgery may be cancelled. If your physical condition changes (I.e. fever, cold or flu) please contact your surgeon as soon as possible. 8. It is important that you be on time. If a situation occurs where you may be late, please call [phone] (OR Holding Area). 9. If you have any questions and or problems, please call [phone] (Pre-admission Testing). 10. Your surgery time may be subject to change. You will receive a phone call the evening prior if your time changes. 11.  If having outpatient surgery, you must have someone to drive you here, stay with you during the duration of your stay, and to drive you home at time of discharge. 12. The following link is for the educational video for patients and/or families. http://[facility].org/. [email or web address]-[email or web address]-[email or web address]-materials    Special Instructions: FOLLOW INSTRUCTIONS REGARDING VITAMINS, SUPPLEMENTS, CELEBREX PER DR EVANS       TAKE ALL MEDICATIONS THE DAY OF SURGERY EXCEPT: NO EXCEPTIONS      I understand a pre-operative phone call will be made to verify my surgery time. In the event that I am not available, I give permission for a message to be left on my answering service and/or with another person?   yes         ___________________      __________   _________    Maria Eugenia Coulter of Patient)             (Witness)                (Date and Time)

## 2021-03-09 NOTE — PERIOP NOTES
Hibiclens/Chlorhexidine    Preventing Infections Before and After - Your Surgery    IMPORTANT INSTRUCTIONS    Please read and follow these instructions carefully. If you are unable to comply with the below instructions your procedure will be cancelled. Every Night for Three (3) nights before your surgery:  1. Shower with an antibacterial soap, such as Dial, or the soap provided at your preassessment appointment. A shower is better than a bath for cleaning your skin. 2. If needed, ask someone to help you reach all areas of your body. Dont forget to clean your belly button with every shower. The night before your surgery: If you lose your Hibiclens/chlorhexidine please contact surgery center or you can purchase it at a local pharmacy  1. On the night before your surgery, shower with an antibacterial soap, such as Dial, or the soap provided at your preassessment appointment. 2. With one packet of Hibiclens/Chlorhexidine in hand, turn water off.  3. Apply Hibiclens antiseptic skin cleanser with a clean, freshly washed washcloth. ? Gently apply to your body from chin to toes (except the genital area) and especially the area(s) where your incision(s) will be. ? Leave Hibiclens/Chlorhexidine on your skin for at least 20 seconds. CAUTION: If needed, Hibiclens/chlorhexidine may be used to clean the folds of skin of the legs (such as in the area of the groin) and on your buttocks and hips. However, do not use Hibiclens/Chlorhexidine above the neck or in the genital area (your bottom) or put inside any area of your body. 4. Turn the water back on and rinse. 5. Dry gently with a clean, freshly washed towel. 6. After your shower, do not use any powder, deodorant, perfumes or lotion. 7. Use clean, freshly washed towels and washcloths every time you shower. 8. Wear clean, freshly washed pajamas to bed the night before surgery. 9. Sleep on clean, freshly washed sheets.   10. Do not allow pets to sleep in your bed with you. The Morning of your surgery:  1. Shower again thoroughly with an antibacterial soap, such as Dial or the soap provided at your preassessment appointment. If needed, ask someone for help to reach all areas of your body. Dont forget to clean your belly button! Rinse. 2. Dry gently with a clean, freshly washed towel. 3. After your shower, do not use any powder, deodorant, perfumes or lotion prior to surgery. 4. Put on clean, freshly washed clothing. Tips to help prevent infections after your surgery:  1. Protect your surgical wound from germs:  ? Hand washing is the most important thing you and your caregivers can do to prevent infections. ? Keep your bandage clean and dry! ? Do not touch your surgical wound. 2. Use clean, freshly washed towels and washcloths every time you shower; do not share bath linens with others. 3. Until your surgical wound is healed, wear clothing and sleep on bed linens each day that are clean and freshly washed. 4. Do not allow pets to sleep in your bed with you or touch your surgical wound. 5. Do not smoke - smoking delays wound healing. This may be a good time to stop smoking. 6. If you have diabetes, it is important for you to manage your blood sugar levels properly before your surgery as well as after your surgery. Poorly managed blood sugar levels slow down wound healing and prevent you from healing completely. If you lose your Hibiclens/chlorhexidine, please call the [facility], or it is available for purchase at your pharmacy.                ___________________      ___________________      ________________  (Signature of Patient)          (Witness)                   (Date and Time)

## 2021-03-10 ENCOUNTER — TELEPHONE (OUTPATIENT)
Dept: FAMILY MEDICINE CLINIC | Age: 71
End: 2021-03-10

## 2021-03-10 LAB
BACTERIA SPEC CULT: NORMAL
BACTERIA SPEC CULT: NORMAL
SERVICE CMNT-IMP: NORMAL

## 2021-03-10 NOTE — ADVANCED PRACTICE NURSE
PAT Nurse Practitioner   Pre-Operative Chart Review/Assessment:-ORTHOPEDIC/NEUROSURGICAL SPINE                Patient Name:  Angelica Kuhn                                                         Age:   66 y.o.    :  1950     Today's Date:  3/10/2021     Date of PAT:   3/9/21      Date of Surgery:    3/22/21     Procedure(s):  Bilateral  Total Hip Arthroplasty     Surgeon:   Julián Dacosta     Medical Clearance:  Delfina Pressley NP                   PLAN:      1)  Cardiac Clearance:  Not requested       2)  Diabetic Treatment Consult:  Not indicated-A1C 5.4      3)  Sleep Apnea evaluation:   Not indicated-ANGELA 2       4) Treatment for MRSA/Staph Aureus:  Negative      5) Additional Concerns:  Former smoker. Lives alone and has no caregiver.   Plans for dc to rehab after surgery                Vital Signs:         Vitals:    21 1006   BP: (!) 158/68   Pulse: 68   Resp: 16   Temp: 98.3 °F (36.8 °C)   SpO2: 100%   Weight: 72 kg (158 lb 11.7 oz)   Height: 5' 5\" (1.651 m)            ____________________________________________  PAST MEDICAL HISTORY  Past Medical History:   Diagnosis Date    Acquired hypothyroidism     Arthritis     Essential hypertension     History of mammogram 2021    History of Papanicolaou smear of cervix 2021    Pure hypercholesterolemia     patient denies hx    Vitamin D deficiency       ____________________________________________  PAST SURGICAL HISTORY  Past Surgical History:   Procedure Laterality Date    COLONOSCOPY N/A 2021    COLONOSCOPY and EGD performed by Carlos Steward MD at [facility] HX BREAST BIOPSY Left     Papiloma     HX COLONOSCOPY  2017    HX COLONOSCOPY  2012    HX HYSTERECTOMY  1997    HX ORTHOPAEDIC  2020    laminectomy and bilateral foraminotomy    HX UROLOGICAL      Uretheral dilatation x 3      ____________________________________________  HOME MEDICATIONS    Current Outpatient Medications   Medication Sig    methocarbamoL (ROBAXIN) 500 mg tablet Take 500 mg by mouth two (2) times daily as needed for Muscle Spasm(s).  methocarbamoL (ROBAXIN) 750 mg tablet Take 750 mg by mouth daily as needed for Muscle Spasm(s).  therapeutic multivitamin-minerals (THERAGRAN-M) tablet Take 1 Tab by mouth daily.  CALCIUM-MAGNESIUM-VITAMIN D2 PO Take 2 Tabs by mouth two (2) times a day.  lidocaine/me-sal/menthol/camph (CBD-KINGS WITH LIDOCAINE EX) by Apply Externally route as needed.  fish oil-omega-3 fatty acids 300-500 mg cap Take 2 Tabs by mouth daily.  glucosamine-chondroitin (ARTHX) 500-400 mg cap Take 3-5 Caps by mouth daily.  atenoloL (TENORMIN) 100 mg tablet TAKE 1 TABLET BY MOUTH EVERY DAY (Patient taking differently: Take 100 mg by mouth nightly.)    synthroid 50 mcg tablet TAKE 1 TABLET BY MOUTH EVERY DAY (Patient taking differently: Take 50 mcg by mouth Daily (before breakfast). )    hydroCHLOROthiazide (HYDRODIURIL) 25 mg tablet TAKE 1/2 TABLET BY MOUTH ONCE A DAY (Patient taking differently: Take 12.5 mg by mouth daily.)    celecoxib (CeleBREX) 100 mg capsule Take 100 mg by mouth two (2) times a day.  cholecalciferol (VITAMIN D3) (2,000 UNITS /50 MCG) cap capsule Take 1 Cap by mouth as needed.  estradioL (VIVELLE) 0.0375 mg/24 hr 1 Patch by TransDERmal route two (2) days a week.  Sundays and Thursdays     No current facility-administered medications for this encounter.       ____________________________________________  ALLERGIES  Allergies   Allergen Reactions    Nitrofurantoin Nausea and Vomiting      ____________________________________________  SOCIAL HISTORY  Social History     Tobacco Use    Smoking status: Former Smoker     Packs/day: 1.00     Years: 35.00     Pack years: 35.00    Smokeless tobacco: Never Used    Tobacco comment: Quit 4/1999   Substance Use Topics    Alcohol use: Yes     Comment: ocassion      ____________________________________________        Labs:     Hospital Outpatient Visit on 03/09/2021   Component Date Value Ref Range Status    WBC 03/09/2021 7.5  3.6 - 11.0 K/uL Final    RBC 03/09/2021 4.59  3.80 - 5.20 M/uL Final    HGB 03/09/2021 12.9  11.5 - 16.0 g/dL Final    HCT 03/09/2021 39.5  35.0 - 47.0 % Final    MCV 03/09/2021 86.1  80.0 - 99.0 FL Final    MCH 03/09/2021 28.1  26.0 - 34.0 PG Final    MCHC 03/09/2021 32.7  30.0 - 36.5 g/dL Final    RDW 03/09/2021 13.3  11.5 - 14.5 % Final    PLATELET 81/46/4107 087  150 - 400 K/uL Final    MPV 03/09/2021 10.8  8.9 - 12.9 FL Final    NRBC 03/09/2021 0.0  0  WBC Final    ABSOLUTE NRBC 03/09/2021 0.00  0.00 - 0.01 K/uL Final    Special Requests: 03/09/2021 NO SPECIAL REQUESTS    Final    Culture result: 03/09/2021 MRSA NOT PRESENT    Final    Culture result: 03/09/2021 Screening of patient nares for MRSA is for surveillance purposes and, if positive, to facilitate isolation considerations in high risk settings. It is not intended for automatic decolonization interventions per se as regimens are not sufficiently effective to warrant routine use.     Final    Ventricular Rate 03/09/2021 63  BPM Final    Atrial Rate 03/09/2021 39  BPM Final    QRS Duration 03/09/2021 116  ms Final    Q-T Interval 03/09/2021 432  ms Final    QTC Calculation (Bezet) 03/09/2021 442  ms Final    Calculated R Axis 03/09/2021 61  degrees Final    Calculated T Axis 03/09/2021 39  degrees Final    Diagnosis 03/09/2021    Final                    Value:Sinus rhythm  Incomplete right bundle branch block  Nonspecific ST and T wave abnormality  No previous ECGs available  Confirmed by Shanel Sebastian ([id number]) on 3/9/2021 1:03:24 PM      Hemoglobin A1c 03/09/2021 5.4  4.0 - 5.6 % Final    Comment: NEW METHOD  PLEASE NOTE NEW REFERENCE RANGE  (NOTE)  HbA1C Interpretive Ranges  <5.7              Normal  5.7 - 6.4         Consider Prediabetes  >6.5              Consider Diabetes      Est. average glucose 03/09/2021 108  mg/dL Final  INR 03/09/2021 1.0  0.9 - 1.1   Final    A single therapeutic range for Vit K antagonists may not be optimal for all indications - see June, 2008 issue of Chest, American College of Chest Physicians Evidence-Based Clinical Practice Guidelines, 8th Edition.  Prothrombin time 03/09/2021 10.3  9.0 - 11.1 sec Final    Color 03/09/2021 YELLOW/STRAW    Final    Color Reference Range: Straw, Yellow or Dark Yellow    Appearance 03/09/2021 CLEAR  CLEAR   Final    Specific gravity 03/09/2021 1.007  1.003 - 1.030   Final    pH (UA) 03/09/2021 6.5  5.0 - 8.0   Final    Protein 03/09/2021 Negative  NEG mg/dL Final    Glucose 03/09/2021 Negative  NEG mg/dL Final    Ketone 03/09/2021 Negative  NEG mg/dL Final    Bilirubin 03/09/2021 Negative  NEG   Final    Blood 03/09/2021 TRACE* NEG   Final    Urobilinogen 03/09/2021 0.2  0.2 - 1.0 EU/dL Final    Nitrites 03/09/2021 Negative  NEG   Final    Leukocyte Esterase 03/09/2021 Negative  NEG   Final    WBC 03/09/2021 0-4  0 - 4 /hpf Final    RBC 03/09/2021 5-10  0 - 5 /hpf Final    Epithelial cells 03/09/2021 FEW  FEW /lpf Final    Epithelial cell category consists of squamous cells and /or transitional urothelial cells. Renal tubular cells, if present, are separately identified as such.     Bacteria 03/09/2021 Negative  NEG /hpf Final    UA:UC IF INDICATED 03/09/2021 CULTURE NOT INDICATED BY UA RESULT  CNI   Final    Sodium 03/09/2021 135* 136 - 145 mmol/L Final    Potassium 03/09/2021 4.3  3.5 - 5.1 mmol/L Final    Chloride 03/09/2021 102  97 - 108 mmol/L Final    CO2 03/09/2021 30  21 - 32 mmol/L Final    Anion gap 03/09/2021 3* 5 - 15 mmol/L Final    Glucose 03/09/2021 98  65 - 100 mg/dL Final    BUN 03/09/2021 25* 6 - 20 MG/DL Final    Creatinine 03/09/2021 0.68  0.55 - 1.02 MG/DL Final    BUN/Creatinine ratio 03/09/2021 37* 12 - 20   Final    GFR est AA 03/09/2021 >60  >60 ml/min/1.73m2 Final    GFR est non-AA 03/09/2021 >60  >60 ml/min/1.73m2 Final    Estimated GFR is calculated using the IDMS-traceable Modification of Diet in Renal Disease (MDRD) Study equation, reported for both  Americans (GFRAA) and non- Americans (GFRNA), and normalized to 1.73m2 body surface area. The physician must decide which value applies to the patient.  Calcium 03/09/2021 9.3  8.5 - 10.1 MG/DL Final    Bilirubin, total 03/09/2021 0.6  0.2 - 1.0 MG/DL Final    ALT (SGPT) 03/09/2021 25  12 - 78 U/L Final    AST (SGOT) 03/09/2021 18  15 - 37 U/L Final    Alk. phosphatase 03/09/2021 70  45 - 117 U/L Final    Protein, total 03/09/2021 7.1  6.4 - 8.2 g/dL Final    Albumin 03/09/2021 3.6  3.5 - 5.0 g/dL Final    Globulin 03/09/2021 3.5  2.0 - 4.0 g/dL Final    A-G Ratio 03/09/2021 1.0* 1.1 - 2.2   Final    Crossmatch Expiration 03/09/2021 03/23/2021,2359   Final    ABO/Rh(D) 03/09/2021 O POSITIVE   Final    Antibody screen 03/09/2021 NEG   Final   Admission on 02/24/2021, Discharged on 02/24/2021   Component Date Value Ref Range Status    H. pylori from tissue 02/24/2021 Negative  Negative Final    Positive control 02/24/2021 Positive   Final    Negative control 02/24/2021 Negative   Final    Lot no. 02/24/[phone]   Final   Hospital Outpatient Visit on 02/20/2021   Component Date Value Ref Range Status    SARS-CoV-2 02/20/2021 Not Detected  Not Detected   Final    Comment: (NOTE)  This nucleic acid amplification test was developed and its  performance characteristics determined by [employer]. Nucleic acid amplification tests include RT-PCR and TMA. This test  has not been FDA cleared or approved. This test has been authorized  by FDA under an Emergency Use Authorization (EUA).  This test is only  authorized for the duration of time the declaration that  circumstances exist justifying the authorization of the emergency use  of in vitro diagnostic tests for detection of SARS-CoV-2 virus and/or  diagnosis of COVID-19 infection under section 564(b)(1) of the Act,  21 U. S.C. 089SDF-8(U) (1), unless the authorization is terminated or  revoked sooner. When diagnostic testing is negative, the possibility of a false  negative result should be considered in the context of a patient's  recent exposures and the presence of clinical signs and symptoms  consistent with COVID-19. An individual without symptoms of COVID-  19 and who is not shedding SARS-CoV-2                            virus would expect to have a  negative (not detected) result in this assay. Performed At: [address] [id number]  Doris Dean MD DW:[id number]            Skin:   Denies open wounds, cuts, sores, rashes or other areas of concern in PAT assessment.         Alona Roberts NP

## 2021-03-11 ENCOUNTER — VIRTUAL VISIT (OUTPATIENT)
Dept: ORTHOPEDIC SURGERY | Age: 71
End: 2021-03-11
Payer: COMMERCIAL

## 2021-03-11 DIAGNOSIS — M16.0 BILATERAL PRIMARY OSTEOARTHRITIS OF HIP: Primary | ICD-10-CM

## 2021-03-11 PROCEDURE — 99443 PR PHYS/QHP TELEPHONE EVALUATION 21-30 MIN: CPT | Performed by: ORTHOPAEDIC SURGERY

## 2021-03-11 NOTE — PROGRESS NOTES
3/11/2021      CC: Bilateral hip pain    HPI:      This is a 66y.o. year old female who does have a history of bilateral hip arthrosis, she has multiple questions regarding her surgery plan for a week from now, she wanted to discuss this with me personally, I am calling her for consultation purposes. PMH:  Past Medical History:   Diagnosis Date    Acquired hypothyroidism     Arthritis     Essential hypertension     History of mammogram 01/06/2021    History of Papanicolaou smear of cervix 01/2021    Pure hypercholesterolemia     patient denies hx    Vitamin D deficiency        PSxHx:  Past Surgical History:   Procedure Laterality Date    COLONOSCOPY N/A 2/24/2021    COLONOSCOPY and EGD performed by Galilea York MD at [address] [facility] HX BREAST BIOPSY Left     Papiloma 1991    HX COLONOSCOPY  12/14/2017    HX COLONOSCOPY  11/01/2012    HX HYSTERECTOMY  06/01/1997    HX ORTHOPAEDIC  02/27/2020    laminectomy and bilateral foraminotomy    HX UROLOGICAL      Uretheral dilatation x 3       Meds:    Current Outpatient Medications:     methocarbamoL (ROBAXIN) 500 mg tablet, Take 500 mg by mouth two (2) times daily as needed for Muscle Spasm(s). , Disp: , Rfl:     methocarbamoL (ROBAXIN) 750 mg tablet, Take 750 mg by mouth daily as needed for Muscle Spasm(s). , Disp: , Rfl:     therapeutic multivitamin-minerals (THERAGRAN-M) tablet, Take 1 Tab by mouth daily. , Disp: , Rfl:     CALCIUM-MAGNESIUM-VITAMIN D2 PO, Take 2 Tabs by mouth two (2) times a day., Disp: , Rfl:     lidocaine/me-sal/menthol/camph (CBD-KINGS WITH LIDOCAINE EX), by Apply Externally route as needed. , Disp: , Rfl:     fish oil-omega-3 fatty acids 300-500 mg cap, Take 2 Tabs by mouth daily. , Disp: , Rfl:     glucosamine-chondroitin (ARTHX) 500-400 mg cap, Take 3-5 Caps by mouth daily. , Disp: , Rfl:     atenoloL (TENORMIN) 100 mg tablet, TAKE 1 TABLET BY MOUTH EVERY DAY (Patient taking differently: Take 100 mg by mouth nightly.), Disp: 90 Tab, Rfl: 1    synthroid 50 mcg tablet, TAKE 1 TABLET BY MOUTH EVERY DAY (Patient taking differently: Take 50 mcg by mouth Daily (before breakfast). ), Disp: 90 Tab, Rfl: 2    hydroCHLOROthiazide (HYDRODIURIL) 25 mg tablet, TAKE 1/2 TABLET BY MOUTH ONCE A DAY (Patient taking differently: Take 12.5 mg by mouth daily.), Disp: 45 Tab, Rfl: 1    celecoxib (CeleBREX) 100 mg capsule, Take 100 mg by mouth two (2) times a day., Disp: , Rfl:     cholecalciferol (VITAMIN D3) (2,000 UNITS /50 MCG) cap capsule, Take 1 Cap by mouth as needed. , Disp: , Rfl:     estradioL (VIVELLE) 0.0375 mg/24 hr, 1 Patch by TransDERmal route two (2) days a week.  Sundays and Thursdays, Disp: , Rfl:     All:  Allergies   Allergen Reactions    Nitrofurantoin Nausea and Vomiting       Social Hx:  Social History     Socioeconomic History    Marital status:      Spouse name: Not on file    Number of children: Not on file    Years of education: Not on file    Highest education level: Not on file   Tobacco Use    Smoking status: Former Smoker     Packs/day: 1.00     Years: 35.00     Pack years: 35.00    Smokeless tobacco: Never Used    Tobacco comment: Quit 4/1999   Substance and Sexual Activity    Alcohol use: Yes     Comment: ocassion    Drug use: Never       Family Hx:  Family History   Problem Relation Age of Onset    Breast Cancer Mother 80    Hypertension Mother     Colon Cancer Father    [facility] Arthritis-rheumatoid Father     Hypertension Father     Cancer Brother         stomach         Review of Systems:       General: Denies headache, lethargy, fever, weight loss  Ears/Nose/Throat: Denies ear discharge, drainage, nosebleeds, hoarse voice, dental problems  Cardiovascular: Denies chest pain, shortness of breath  Lungs: Denies chest pain, breathing problems, wheezing, pneumonia  Stomach: Denies stomach pain, heartburn, constipation, irritable bowel  Skin: Denies rash, sores, open wounds  Musculoskeletal: Bilateral hip pain  Genitourinary: Denies dysuria, hematuria, polyuria  Gastrointestinal: Denies constipation, obstipation, diarrhea  Neurological: Denies changes in sight, smell, hearing, taste, seizures. Denies loss of consciousness. Psychiatric: Denies depression, sleep pattern changes, anxiety, change in personality  Endocrine: Denies mood swings, heat or cold intolerance  Hematologic/Lymphatic: Denies anemia, purpura, petechia  Allergic/Immunologic: Denies swelling of throat, pain or swelling at lymph nodes      Physical Examination:    There were no vitals taken for this visit. General: AOX3, no apparent distress  Psychiatric: mood and affect appropriate        Diagnostics:    Pertinent Diagnostics: None today    Assessment: osteoarthritis bilateral hip, plan for bilateral total hip arthroplasty  Plan: This patient I discussed her preoperative regimen, preoperative medications, which she should and should not take, preoperative bathing protocol, any and all questions were answered at the end of the consultation. She stated her understanding and satisfaction and will follow-up with me at the time of surgery. Patient was in [place] at the time of consultation. I was in the office while conducting this encounter. Consent:  She and/or her healthcare decision maker is aware that this patient-initiated Telehealth encounter is a billable service, with coverage as determined by her insurance carrier. She is aware that she may receive a bill and has provided verbal consent to proceed: Yes    This virtual visit was conducted telephone encounter only. -  I affirm this is a Patient Initiated Episode with an Established Patient who has not had a related appointment within my department in the past 7 days or scheduled within the next 24 hours. Note: this encounter is not billable if this call serves to triage the patient into an appointment for the relevant concern. Total Time: minutes: 21-30 minutes.       Ms. Estephanie Whipple has a reminder for a \"due or due soon\" health maintenance. I have asked that she contact her primary care provider for follow-up on this health maintenance.

## 2021-03-18 ENCOUNTER — HOSPITAL ENCOUNTER (OUTPATIENT)
Dept: PREADMISSION TESTING | Age: 71
Discharge: HOME | End: 2021-03-18
Payer: COMMERCIAL

## 2021-03-18 LAB — SARS-COV-2, COV2: NORMAL

## 2021-03-18 PROCEDURE — U0005 INFEC AGEN DETEC AMPLI PROBE: HCPCS

## 2021-03-18 NOTE — H&P
3/18/2021    Chief Complaint: Bilateral hip pain    HPI: This is a 66 y.o. patient who complains of Bilateral hip pain which is activity dependent. The patient has failed nonoperative management of this end stage arthritis of the left hip and would like to proceed with a total hip arthroplasty. Patient has been medically and specialty cleared. The patient denies any numbness, weakness, tingling, fevers, chills, nausea, vomiting, fevers, chills, nausea, vomiting. Past Medical History:   Diagnosis Date    Acquired hypothyroidism     Arthritis     Essential hypertension     History of mammogram 01/06/2021    History of Papanicolaou smear of cervix 01/2021    Pure hypercholesterolemia     patient denies hx    Vitamin D deficiency        Past Surgical History:   Procedure Laterality Date    COLONOSCOPY N/A 2/24/2021    COLONOSCOPY and EGD performed by Patel Patel MD at [address] [facility] HX BREAST BIOPSY Left     Papiloma 1991    HX COLONOSCOPY  12/14/2017    HX COLONOSCOPY  11/01/2012    HX HYSTERECTOMY  06/01/1997    HX ORTHOPAEDIC  02/27/2020    laminectomy and bilateral foraminotomy    HX UROLOGICAL      Uretheral dilatation x 3       No current facility-administered medications on file prior to encounter. Current Outpatient Medications on File Prior to Encounter   Medication Sig Dispense Refill    atenoloL (TENORMIN) 100 mg tablet TAKE 1 TABLET BY MOUTH EVERY DAY (Patient taking differently: Take 100 mg by mouth nightly.) 90 Tab 1    synthroid 50 mcg tablet TAKE 1 TABLET BY MOUTH EVERY DAY (Patient taking differently: Take 50 mcg by mouth Daily (before breakfast). ) 90 Tab 2    hydroCHLOROthiazide (HYDRODIURIL) 25 mg tablet TAKE 1/2 TABLET BY MOUTH ONCE A DAY (Patient taking differently: Take 12.5 mg by mouth daily.) 45 Tab 1    celecoxib (CeleBREX) 100 mg capsule Take 100 mg by mouth two (2) times a day.       cholecalciferol (VITAMIN D3) (2,000 UNITS /50 MCG) cap capsule Take 1 Cap by mouth as needed.  estradioL (VIVELLE) 0.0375 mg/24 hr 1 Patch by TransDERmal route two (2) days a week. Sundays and Thursdays         Allergies   Allergen Reactions    Nitrofurantoin Nausea and Vomiting       Family History   Problem Relation Age of Onset    Breast Cancer Mother 80    Hypertension Mother     Colon Cancer Father     Arthritis-rheumatoid Father     Hypertension Father     Cancer Brother         stomach       Social History     Socioeconomic History    Marital status:      Spouse name: Not on file    Number of children: Not on file    Years of education: Not on file    Highest education level: Not on file   Tobacco Use    Smoking status: Former Smoker     Packs/day: 1.00     Years: 35.00     Pack years: 35.00    Smokeless tobacco: Never Used    Tobacco comment: Quit 4/1999   Substance and Sexual Activity    Alcohol use: Yes     Comment: ocassion    Drug use: Never         Review of Systems:   Unless noted in the HPI, all 12 systems have been reviewed and are negative for pertinent positives. Physical Examination:      AOX3  No apparent distress  Lungs: Clear to auscultation bilaterally  Heart: regular rate and rhythm  Abdomen: soft, no guarding  Head: NC/AT  Sensation intact to light touch: L1-S1 dermatomes    Extremities      Left upper extremity: full active and passive range of motion without pain, deformity, open wound, strength 5/5 in all major muscle groups  Right upper extremity:full active and passive range of motion without pain, deformity, open wound, strength 5/5 in all major muscle groups  Right lower extremity: No deformity is noted. Circumduction of the hip causes significant pain in the groin, reproductive of the chief complaint. Range of motion is limited, with a positive impingement sign. Gait is antalgic. Sensation is intact to light touch in the L1-S1 dermatomes. Skin is intact about the hip.   Hip flexion and abduction strength is 4+/5, hip extension and knee extension as well as tibialis anterior and EHL/GCS are 5/5 strength. Left lower extremity: No deformity is noted. Circumduction of the hip causes significant pain in the groin, reproductive of the chief complaint. Range of motion is limited, with a positive impingement sign. Gait is antalgic. Sensation is intact to light touch in the L1-S1 dermatomes. Skin is intact about the hip. Hip flexion and abduction strength is 4+/5, hip extension and knee extension as well as tibialis anterior and EHL/GCS are 5/5 strength. Pertinent Xrays:  Pelvis and hip xrays indicate endstage arthrosis of the bilateral hip      Assessment: Left hip OA    Plan:  Admit to my service  Plan for bilateral total hip arthroplasty  NPO   Preoperative assessments complete    We did discuss the risks of surgery which include but are not limited to infection, nerve or blood vessel damage, failure of fixation, failure of any possible implant, need for reoperation, postoperative pain and swelling, intra-or postoperative fracture, postoperative dislocation, leg length inequality, need for reoperation, implant failure, death, disability, organ dysfunction, wound healing issues, DVT, PE, and the need for further procedures. The patient did freely state their understanding and satisfaction with our discussion. We will proceed after medical clearances. The patient was counseled at length about the risks of dennise Covid-19 during their perioperative period and any recovery window from their procedure. The patient was made aware that dennise Covid-19  may worsen their prognosis for recovering from their procedure and lend to a higher morbidity and/or mortality risk. All material risks, benefits, and reasonable alternatives including postponing the procedure were discussed. The patient does  wish to proceed with the procedure at this time.

## 2021-03-19 LAB — SARS-COV-2, COV2NT: NOT DETECTED

## 2021-03-22 ENCOUNTER — APPOINTMENT (OUTPATIENT)
Dept: GENERAL RADIOLOGY | Age: 71
DRG: 462 | End: 2021-03-22
Attending: ORTHOPAEDIC SURGERY
Payer: MEDICARE

## 2021-03-22 ENCOUNTER — ANESTHESIA EVENT (OUTPATIENT)
Dept: SURGERY | Age: 71
DRG: 462 | End: 2021-03-22
Payer: MEDICARE

## 2021-03-22 ENCOUNTER — HOSPITAL ENCOUNTER (INPATIENT)
Age: 71
LOS: 2 days | Discharge: NURSING, REHAB OR LONG-TERM CARE | DRG: 462 | End: 2021-03-24
Attending: ORTHOPAEDIC SURGERY | Admitting: ORTHOPAEDIC SURGERY
Payer: MEDICARE

## 2021-03-22 ENCOUNTER — ANESTHESIA (OUTPATIENT)
Dept: SURGERY | Age: 71
DRG: 462 | End: 2021-03-22
Payer: MEDICARE

## 2021-03-22 DIAGNOSIS — Z96.643 STATUS POST TOTAL REPLACEMENT OF BOTH HIPS: Primary | ICD-10-CM

## 2021-03-22 PROBLEM — M16.0 BILATERAL PRIMARY OSTEOARTHRITIS OF HIP: Status: ACTIVE | Noted: 2021-03-22

## 2021-03-22 LAB — SARS-COV-2, COV2: NORMAL

## 2021-03-22 PROCEDURE — 76010000174 HC OR TIME 3.5 TO 4 HR INTENSV-TIER 1: Performed by: ORTHOPAEDIC SURGERY

## 2021-03-22 PROCEDURE — 74011250637 HC RX REV CODE- 250/637: Performed by: ORTHOPAEDIC SURGERY

## 2021-03-22 PROCEDURE — 77030041680 HC PNCL ELECSURG SMK EVAC CNMD -B: Performed by: ORTHOPAEDIC SURGERY

## 2021-03-22 PROCEDURE — 2709999900 HC NON-CHARGEABLE SUPPLY

## 2021-03-22 PROCEDURE — 74011000250 HC RX REV CODE- 250: Performed by: NURSE ANESTHETIST, CERTIFIED REGISTERED

## 2021-03-22 PROCEDURE — 27130 TOTAL HIP ARTHROPLASTY: CPT | Performed by: ORTHOPAEDIC SURGERY

## 2021-03-22 PROCEDURE — 77030013079 HC BLNKT BAIR HGGR 3M -A: Performed by: NURSE ANESTHETIST, CERTIFIED REGISTERED

## 2021-03-22 PROCEDURE — 2709999900 HC NON-CHARGEABLE SUPPLY: Performed by: ORTHOPAEDIC SURGERY

## 2021-03-22 PROCEDURE — 74011250636 HC RX REV CODE- 250/636: Performed by: ORTHOPAEDIC SURGERY

## 2021-03-22 PROCEDURE — 74011250636 HC RX REV CODE- 250/636: Performed by: ANESTHESIOLOGY

## 2021-03-22 PROCEDURE — 77010033678 HC OXYGEN DAILY

## 2021-03-22 PROCEDURE — 74011000250 HC RX REV CODE- 250: Performed by: ORTHOPAEDIC SURGERY

## 2021-03-22 PROCEDURE — 77030018723 HC ELCTRD BLD COVD -A: Performed by: ORTHOPAEDIC SURGERY

## 2021-03-22 PROCEDURE — 97530 THERAPEUTIC ACTIVITIES: CPT

## 2021-03-22 PROCEDURE — 97162 PT EVAL MOD COMPLEX 30 MIN: CPT

## 2021-03-22 PROCEDURE — 76060000038 HC ANESTHESIA 3.5 TO 4 HR: Performed by: ORTHOPAEDIC SURGERY

## 2021-03-22 PROCEDURE — 74011250636 HC RX REV CODE- 250/636: Performed by: NURSE ANESTHETIST, CERTIFIED REGISTERED

## 2021-03-22 PROCEDURE — 73501 X-RAY EXAM HIP UNI 1 VIEW: CPT

## 2021-03-22 PROCEDURE — 77030026438 HC STYL ET INTUB CARD -A: Performed by: NURSE ANESTHETIST, CERTIFIED REGISTERED

## 2021-03-22 PROCEDURE — 76000 FLUOROSCOPY <1 HR PHYS/QHP: CPT

## 2021-03-22 PROCEDURE — 77030031139 HC SUT VCRL2 J&J -A: Performed by: ORTHOPAEDIC SURGERY

## 2021-03-22 PROCEDURE — 76210000016 HC OR PH I REC 1 TO 1.5 HR: Performed by: ORTHOPAEDIC SURGERY

## 2021-03-22 PROCEDURE — 0SR904Z REPLACEMENT OF RIGHT HIP JOINT WITH CERAMIC ON POLYETHYLENE SYNTHETIC SUBSTITUTE, OPEN APPROACH: ICD-10-PCS | Performed by: ORTHOPAEDIC SURGERY

## 2021-03-22 PROCEDURE — U0005 INFEC AGEN DETEC AMPLI PROBE: HCPCS

## 2021-03-22 PROCEDURE — 77030010507 HC ADH SKN DERMBND J&J -B: Performed by: ORTHOPAEDIC SURGERY

## 2021-03-22 PROCEDURE — 77030020061 HC IV BLD WRMR ADMIN SET 3M -B: Performed by: NURSE ANESTHETIST, CERTIFIED REGISTERED

## 2021-03-22 PROCEDURE — 0SRB04Z REPLACEMENT OF LEFT HIP JOINT WITH CERAMIC ON POLYETHYLENE SYNTHETIC SUBSTITUTE, OPEN APPROACH: ICD-10-PCS | Performed by: ORTHOPAEDIC SURGERY

## 2021-03-22 PROCEDURE — 77030002933 HC SUT MCRYL J&J -A: Performed by: ORTHOPAEDIC SURGERY

## 2021-03-22 PROCEDURE — 77030038692 HC WND DEB SYS IRMX -B: Performed by: ORTHOPAEDIC SURGERY

## 2021-03-22 PROCEDURE — 77030036660

## 2021-03-22 PROCEDURE — 77030018673: Performed by: ORTHOPAEDIC SURGERY

## 2021-03-22 PROCEDURE — 77030008684 HC TU ET CUF COVD -B: Performed by: NURSE ANESTHETIST, CERTIFIED REGISTERED

## 2021-03-22 PROCEDURE — 77030006835 HC BLD SAW SAG STRY -B: Performed by: ORTHOPAEDIC SURGERY

## 2021-03-22 PROCEDURE — 77030040361 HC SLV COMPR DVT MDII -B: Performed by: ORTHOPAEDIC SURGERY

## 2021-03-22 PROCEDURE — C1776 JOINT DEVICE (IMPLANTABLE): HCPCS | Performed by: ORTHOPAEDIC SURGERY

## 2021-03-22 PROCEDURE — 72170 X-RAY EXAM OF PELVIS: CPT

## 2021-03-22 PROCEDURE — 65270000029 HC RM PRIVATE

## 2021-03-22 DEVICE — 6.5MM LOW PROFILE HEX SCREW 15MM
Type: IMPLANTABLE DEVICE | Site: HIP | Status: FUNCTIONAL
Brand: TRIDENT II

## 2021-03-22 DEVICE — CERAMIC V40 FEMORAL HEAD
Type: IMPLANTABLE DEVICE | Site: HIP | Status: FUNCTIONAL
Brand: BIOLOX

## 2021-03-22 DEVICE — 6.5MM LOW PROFILE HEX SCREW 30MM
Type: IMPLANTABLE DEVICE | Site: HIP | Status: FUNCTIONAL
Brand: TRIDENT II

## 2021-03-22 DEVICE — TRIDENT II TRITANIUM MULTIHOLE ACETABULAR SHELL 54E
Type: IMPLANTABLE DEVICE | Site: HIP | Status: FUNCTIONAL
Brand: TRIDENT II

## 2021-03-22 DEVICE — 127 DEGREE NECK ANGLE HIP STEM
Type: IMPLANTABLE DEVICE | Site: HIP | Status: FUNCTIONAL
Brand: ACCOLADE

## 2021-03-22 DEVICE — 0 DEGREE POLYETHYLENE INSERT
Type: IMPLANTABLE DEVICE | Site: HIP | Status: FUNCTIONAL
Brand: TRIDENT

## 2021-03-22 DEVICE — TRIDENT II CLUSTERHOLE HA ACETABULAR SHELL 54E
Type: IMPLANTABLE DEVICE | Site: HIP | Status: FUNCTIONAL
Brand: TRIDENT II

## 2021-03-22 DEVICE — HIP H2 TOT ADV OTHER HD -- IMPL CAPPED H2: Type: IMPLANTABLE DEVICE | Status: FUNCTIONAL

## 2021-03-22 RX ORDER — HYDROMORPHONE HYDROCHLORIDE 2 MG/ML
INJECTION, SOLUTION INTRAMUSCULAR; INTRAVENOUS; SUBCUTANEOUS AS NEEDED
Status: DISCONTINUED | OUTPATIENT
Start: 2021-03-22 | End: 2021-03-22 | Stop reason: HOSPADM

## 2021-03-22 RX ORDER — POLYETHYLENE GLYCOL 3350 17 G/17G
17 POWDER, FOR SOLUTION ORAL DAILY
Status: DISCONTINUED | OUTPATIENT
Start: 2021-03-23 | End: 2021-03-24 | Stop reason: HOSPADM

## 2021-03-22 RX ORDER — SUCCINYLCHOLINE CHLORIDE 20 MG/ML
INJECTION INTRAMUSCULAR; INTRAVENOUS AS NEEDED
Status: DISCONTINUED | OUTPATIENT
Start: 2021-03-22 | End: 2021-03-22 | Stop reason: HOSPADM

## 2021-03-22 RX ORDER — METHOCARBAMOL 500 MG/1
500 TABLET, FILM COATED ORAL
Status: DISCONTINUED | OUTPATIENT
Start: 2021-03-22 | End: 2021-03-24 | Stop reason: HOSPADM

## 2021-03-22 RX ORDER — FENTANYL CITRATE 50 UG/ML
25 INJECTION, SOLUTION INTRAMUSCULAR; INTRAVENOUS
Status: DISCONTINUED | OUTPATIENT
Start: 2021-03-22 | End: 2021-03-22 | Stop reason: HOSPADM

## 2021-03-22 RX ORDER — ONDANSETRON 2 MG/ML
INJECTION INTRAMUSCULAR; INTRAVENOUS AS NEEDED
Status: DISCONTINUED | OUTPATIENT
Start: 2021-03-22 | End: 2021-03-22 | Stop reason: HOSPADM

## 2021-03-22 RX ORDER — HYDROMORPHONE HYDROCHLORIDE 1 MG/ML
0.5 INJECTION, SOLUTION INTRAMUSCULAR; INTRAVENOUS; SUBCUTANEOUS
Status: DISPENSED | OUTPATIENT
Start: 2021-03-22 | End: 2021-03-23

## 2021-03-22 RX ORDER — ACETAMINOPHEN 325 MG/1
650 TABLET ORAL
Status: DISCONTINUED | OUTPATIENT
Start: 2021-03-22 | End: 2021-03-22 | Stop reason: SDUPTHER

## 2021-03-22 RX ORDER — SODIUM CHLORIDE 9 MG/ML
125 INJECTION, SOLUTION INTRAVENOUS CONTINUOUS
Status: DISPENSED | OUTPATIENT
Start: 2021-03-22 | End: 2021-03-23

## 2021-03-22 RX ORDER — FAMOTIDINE 20 MG/1
20 TABLET, FILM COATED ORAL 2 TIMES DAILY
Status: DISCONTINUED | OUTPATIENT
Start: 2021-03-22 | End: 2021-03-24 | Stop reason: HOSPADM

## 2021-03-22 RX ORDER — ONDANSETRON 4 MG/1
4 TABLET, ORALLY DISINTEGRATING ORAL
Status: DISCONTINUED | OUTPATIENT
Start: 2021-03-22 | End: 2021-03-24 | Stop reason: HOSPADM

## 2021-03-22 RX ORDER — PROPOFOL 10 MG/ML
INJECTION, EMULSION INTRAVENOUS AS NEEDED
Status: DISCONTINUED | OUTPATIENT
Start: 2021-03-22 | End: 2021-03-22 | Stop reason: HOSPADM

## 2021-03-22 RX ORDER — ASPIRIN 325 MG
325 TABLET, DELAYED RELEASE (ENTERIC COATED) ORAL 2 TIMES DAILY
Status: DISCONTINUED | OUTPATIENT
Start: 2021-03-22 | End: 2021-03-24 | Stop reason: HOSPADM

## 2021-03-22 RX ORDER — CELECOXIB 200 MG/1
200 CAPSULE ORAL ONCE
Status: COMPLETED | OUTPATIENT
Start: 2021-03-22 | End: 2021-03-22

## 2021-03-22 RX ORDER — ACETAMINOPHEN 500 MG
1000 TABLET ORAL EVERY 6 HOURS
Status: DISCONTINUED | OUTPATIENT
Start: 2021-03-22 | End: 2021-03-24 | Stop reason: HOSPADM

## 2021-03-22 RX ORDER — LEVOTHYROXINE SODIUM 50 UG/1
50 TABLET ORAL
Status: DISCONTINUED | OUTPATIENT
Start: 2021-03-23 | End: 2021-03-24 | Stop reason: HOSPADM

## 2021-03-22 RX ORDER — ROPIVACAINE HYDROCHLORIDE 5 MG/ML
INJECTION, SOLUTION EPIDURAL; INFILTRATION; PERINEURAL AS NEEDED
Status: DISCONTINUED | OUTPATIENT
Start: 2021-03-22 | End: 2021-03-22 | Stop reason: HOSPADM

## 2021-03-22 RX ORDER — SODIUM CHLORIDE, SODIUM LACTATE, POTASSIUM CHLORIDE, CALCIUM CHLORIDE 600; 310; 30; 20 MG/100ML; MG/100ML; MG/100ML; MG/100ML
25 INJECTION, SOLUTION INTRAVENOUS CONTINUOUS
Status: DISCONTINUED | OUTPATIENT
Start: 2021-03-22 | End: 2021-03-22 | Stop reason: HOSPADM

## 2021-03-22 RX ORDER — NALOXONE HYDROCHLORIDE 0.4 MG/ML
0.4 INJECTION, SOLUTION INTRAMUSCULAR; INTRAVENOUS; SUBCUTANEOUS AS NEEDED
Status: DISCONTINUED | OUTPATIENT
Start: 2021-03-22 | End: 2021-03-24 | Stop reason: HOSPADM

## 2021-03-22 RX ORDER — TRANEXAMIC ACID 100 MG/ML
INJECTION, SOLUTION INTRAVENOUS AS NEEDED
Status: DISCONTINUED | OUTPATIENT
Start: 2021-03-22 | End: 2021-03-22 | Stop reason: HOSPADM

## 2021-03-22 RX ORDER — SODIUM CHLORIDE 0.9 % (FLUSH) 0.9 %
5-40 SYRINGE (ML) INJECTION AS NEEDED
Status: DISCONTINUED | OUTPATIENT
Start: 2021-03-22 | End: 2021-03-22 | Stop reason: HOSPADM

## 2021-03-22 RX ORDER — VANCOMYCIN HYDROCHLORIDE 1 G/20ML
INJECTION, POWDER, LYOPHILIZED, FOR SOLUTION INTRAVENOUS AS NEEDED
Status: DISCONTINUED | OUTPATIENT
Start: 2021-03-22 | End: 2021-03-22 | Stop reason: HOSPADM

## 2021-03-22 RX ORDER — OXYCODONE HYDROCHLORIDE 5 MG/1
10 TABLET ORAL
Status: DISCONTINUED | OUTPATIENT
Start: 2021-03-22 | End: 2021-03-24 | Stop reason: HOSPADM

## 2021-03-22 RX ORDER — OMEGA-3-ACID ETHYL ESTERS 1 G/1
1 CAPSULE, LIQUID FILLED ORAL DAILY
Status: DISCONTINUED | OUTPATIENT
Start: 2021-03-23 | End: 2021-03-24 | Stop reason: HOSPADM

## 2021-03-22 RX ORDER — FACIAL-BODY WIPES
10 EACH TOPICAL DAILY PRN
Status: DISCONTINUED | OUTPATIENT
Start: 2021-03-24 | End: 2021-03-24 | Stop reason: HOSPADM

## 2021-03-22 RX ORDER — LANOLIN ALCOHOL/MO/W.PET/CERES
400 CREAM (GRAM) TOPICAL DAILY
Status: DISCONTINUED | OUTPATIENT
Start: 2021-03-23 | End: 2021-03-24 | Stop reason: HOSPADM

## 2021-03-22 RX ORDER — LIDOCAINE HYDROCHLORIDE 20 MG/ML
INJECTION, SOLUTION EPIDURAL; INFILTRATION; INTRACAUDAL; PERINEURAL AS NEEDED
Status: DISCONTINUED | OUTPATIENT
Start: 2021-03-22 | End: 2021-03-22 | Stop reason: HOSPADM

## 2021-03-22 RX ORDER — AMOXICILLIN 250 MG
1 CAPSULE ORAL 2 TIMES DAILY
Status: DISCONTINUED | OUTPATIENT
Start: 2021-03-22 | End: 2021-03-24 | Stop reason: HOSPADM

## 2021-03-22 RX ORDER — SODIUM CHLORIDE 0.9 % (FLUSH) 0.9 %
5-40 SYRINGE (ML) INJECTION EVERY 8 HOURS
Status: DISCONTINUED | OUTPATIENT
Start: 2021-03-22 | End: 2021-03-22 | Stop reason: HOSPADM

## 2021-03-22 RX ORDER — DEXAMETHASONE SODIUM PHOSPHATE 4 MG/ML
INJECTION, SOLUTION INTRA-ARTICULAR; INTRALESIONAL; INTRAMUSCULAR; INTRAVENOUS; SOFT TISSUE AS NEEDED
Status: DISCONTINUED | OUTPATIENT
Start: 2021-03-22 | End: 2021-03-22 | Stop reason: HOSPADM

## 2021-03-22 RX ORDER — ROCURONIUM BROMIDE 10 MG/ML
INJECTION, SOLUTION INTRAVENOUS AS NEEDED
Status: DISCONTINUED | OUTPATIENT
Start: 2021-03-22 | End: 2021-03-22 | Stop reason: HOSPADM

## 2021-03-22 RX ORDER — SODIUM CHLORIDE 9 MG/ML
INJECTION, SOLUTION INTRAVENOUS
Status: COMPLETED | OUTPATIENT
Start: 2021-03-22 | End: 2021-03-22

## 2021-03-22 RX ORDER — DEXAMETHASONE SODIUM PHOSPHATE 4 MG/ML
10 INJECTION, SOLUTION INTRA-ARTICULAR; INTRALESIONAL; INTRAMUSCULAR; INTRAVENOUS; SOFT TISSUE ONCE
Status: DISCONTINUED | OUTPATIENT
Start: 2021-03-22 | End: 2021-03-22 | Stop reason: HOSPADM

## 2021-03-22 RX ORDER — EPHEDRINE SULFATE/0.9% NACL/PF 50 MG/5 ML
SYRINGE (ML) INTRAVENOUS AS NEEDED
Status: DISCONTINUED | OUTPATIENT
Start: 2021-03-22 | End: 2021-03-22 | Stop reason: HOSPADM

## 2021-03-22 RX ORDER — SODIUM CHLORIDE 0.9 % (FLUSH) 0.9 %
5-40 SYRINGE (ML) INJECTION AS NEEDED
Status: DISCONTINUED | OUTPATIENT
Start: 2021-03-22 | End: 2021-03-24 | Stop reason: HOSPADM

## 2021-03-22 RX ORDER — OXYCODONE HYDROCHLORIDE 5 MG/1
5 TABLET ORAL
Status: DISCONTINUED | OUTPATIENT
Start: 2021-03-22 | End: 2021-03-24 | Stop reason: HOSPADM

## 2021-03-22 RX ORDER — SODIUM CHLORIDE 0.9 % (FLUSH) 0.9 %
5-40 SYRINGE (ML) INJECTION EVERY 8 HOURS
Status: DISCONTINUED | OUTPATIENT
Start: 2021-03-22 | End: 2021-03-24 | Stop reason: HOSPADM

## 2021-03-22 RX ORDER — ACETAMINOPHEN 500 MG
1000 TABLET ORAL ONCE
Status: COMPLETED | OUTPATIENT
Start: 2021-03-22 | End: 2021-03-22

## 2021-03-22 RX ORDER — MELATONIN
2000 DAILY
Status: DISCONTINUED | OUTPATIENT
Start: 2021-03-23 | End: 2021-03-24 | Stop reason: HOSPADM

## 2021-03-22 RX ORDER — ONDANSETRON 2 MG/ML
4 INJECTION INTRAMUSCULAR; INTRAVENOUS AS NEEDED
Status: DISCONTINUED | OUTPATIENT
Start: 2021-03-22 | End: 2021-03-22 | Stop reason: HOSPADM

## 2021-03-22 RX ORDER — FERROUS SULFATE, DRIED 160(50) MG
2 TABLET, EXTENDED RELEASE ORAL
Status: DISCONTINUED | OUTPATIENT
Start: 2021-03-23 | End: 2021-03-24 | Stop reason: HOSPADM

## 2021-03-22 RX ORDER — NEOSTIGMINE METHYLSULFATE 1 MG/ML
INJECTION, SOLUTION INTRAVENOUS AS NEEDED
Status: DISCONTINUED | OUTPATIENT
Start: 2021-03-22 | End: 2021-03-22 | Stop reason: HOSPADM

## 2021-03-22 RX ORDER — KETOROLAC TROMETHAMINE 30 MG/ML
15 INJECTION, SOLUTION INTRAMUSCULAR; INTRAVENOUS EVERY 6 HOURS
Status: COMPLETED | OUTPATIENT
Start: 2021-03-22 | End: 2021-03-23

## 2021-03-22 RX ORDER — GLYCOPYRROLATE 0.2 MG/ML
INJECTION INTRAMUSCULAR; INTRAVENOUS AS NEEDED
Status: DISCONTINUED | OUTPATIENT
Start: 2021-03-22 | End: 2021-03-22 | Stop reason: HOSPADM

## 2021-03-22 RX ORDER — FENTANYL CITRATE 50 UG/ML
INJECTION, SOLUTION INTRAMUSCULAR; INTRAVENOUS AS NEEDED
Status: DISCONTINUED | OUTPATIENT
Start: 2021-03-22 | End: 2021-03-22 | Stop reason: HOSPADM

## 2021-03-22 RX ADMIN — TRANEXAMIC ACID 1 G: 100 INJECTION, SOLUTION INTRAVENOUS at 10:52

## 2021-03-22 RX ADMIN — Medication 3 MG: at 11:03

## 2021-03-22 RX ADMIN — HYDROMORPHONE HYDROCHLORIDE 0.5 MG: 2 INJECTION, SOLUTION INTRAMUSCULAR; INTRAVENOUS; SUBCUTANEOUS at 10:36

## 2021-03-22 RX ADMIN — HYDROMORPHONE HYDROCHLORIDE 0.5 MG: 1 INJECTION, SOLUTION INTRAMUSCULAR; INTRAVENOUS; SUBCUTANEOUS at 15:48

## 2021-03-22 RX ADMIN — SODIUM CHLORIDE, POTASSIUM CHLORIDE, SODIUM LACTATE AND CALCIUM CHLORIDE 25 ML/HR: 600; 310; 30; 20 INJECTION, SOLUTION INTRAVENOUS at 07:02

## 2021-03-22 RX ADMIN — ASPIRIN 325 MG: 325 TABLET, COATED ORAL at 17:10

## 2021-03-22 RX ADMIN — WATER 2 G: 1 INJECTION INTRAMUSCULAR; INTRAVENOUS; SUBCUTANEOUS at 07:59

## 2021-03-22 RX ADMIN — Medication 1000 MG: at 07:03

## 2021-03-22 RX ADMIN — KETOROLAC TROMETHAMINE 15 MG: 30 INJECTION, SOLUTION INTRAMUSCULAR at 17:11

## 2021-03-22 RX ADMIN — SUCCINYLCHOLINE CHLORIDE 120 MG: 20 INJECTION, SOLUTION INTRAMUSCULAR; INTRAVENOUS at 07:34

## 2021-03-22 RX ADMIN — HYDROMORPHONE HYDROCHLORIDE 0.5 MG: 2 INJECTION, SOLUTION INTRAMUSCULAR; INTRAVENOUS; SUBCUTANEOUS at 09:51

## 2021-03-22 RX ADMIN — PROPOFOL 50 MG: 10 INJECTION, EMULSION INTRAVENOUS at 07:35

## 2021-03-22 RX ADMIN — SODIUM CHLORIDE 80 MCG/MIN: 900 INJECTION, SOLUTION INTRAVENOUS at 07:40

## 2021-03-22 RX ADMIN — WATER 2 G: 1 INJECTION INTRAMUSCULAR; INTRAVENOUS; SUBCUTANEOUS at 18:18

## 2021-03-22 RX ADMIN — CELECOXIB 200 MG: 200 CAPSULE ORAL at 07:03

## 2021-03-22 RX ADMIN — OXYCODONE 10 MG: 5 TABLET ORAL at 21:04

## 2021-03-22 RX ADMIN — Medication 1 AMPULE: at 21:06

## 2021-03-22 RX ADMIN — GLYCOPYRROLATE 0.4 MG: 0.2 INJECTION, SOLUTION INTRAMUSCULAR; INTRAVENOUS at 11:03

## 2021-03-22 RX ADMIN — Medication 20 MG: at 07:38

## 2021-03-22 RX ADMIN — Medication 1 LOZENGE: at 18:17

## 2021-03-22 RX ADMIN — ACETAMINOPHEN 1000 MG: 500 TABLET, FILM COATED ORAL at 17:10

## 2021-03-22 RX ADMIN — Medication 3 AMPULE: at 07:02

## 2021-03-22 RX ADMIN — SODIUM CHLORIDE, POTASSIUM CHLORIDE, SODIUM LACTATE AND CALCIUM CHLORIDE: 600; 310; 30; 20 INJECTION, SOLUTION INTRAVENOUS at 09:51

## 2021-03-22 RX ADMIN — FAMOTIDINE 20 MG: 20 TABLET ORAL at 17:10

## 2021-03-22 RX ADMIN — WATER 2 G: 1 INJECTION INTRAMUSCULAR; INTRAVENOUS; SUBCUTANEOUS at 11:18

## 2021-03-22 RX ADMIN — ONDANSETRON HYDROCHLORIDE 4 MG: 2 INJECTION, SOLUTION INTRAMUSCULAR; INTRAVENOUS at 11:03

## 2021-03-22 RX ADMIN — ONDANSETRON 4 MG: 4 TABLET, ORALLY DISINTEGRATING ORAL at 13:06

## 2021-03-22 RX ADMIN — LIDOCAINE HYDROCHLORIDE 100 MG: 20 INJECTION, SOLUTION INTRAVENOUS at 07:34

## 2021-03-22 RX ADMIN — SODIUM CHLORIDE 125 ML/HR: 9 INJECTION, SOLUTION INTRAVENOUS at 11:42

## 2021-03-22 RX ADMIN — DOCUSATE SODIUM - SENNOSIDES 1 TABLET: 50; 8.6 TABLET, FILM COATED ORAL at 17:10

## 2021-03-22 RX ADMIN — FAMOTIDINE 20 MG: 20 TABLET ORAL at 14:09

## 2021-03-22 RX ADMIN — FENTANYL CITRATE 25 MCG: 50 INJECTION, SOLUTION INTRAMUSCULAR; INTRAVENOUS at 11:59

## 2021-03-22 RX ADMIN — ACETAMINOPHEN 1000 MG: 500 TABLET, FILM COATED ORAL at 14:10

## 2021-03-22 RX ADMIN — FENTANYL CITRATE 100 MCG: 50 INJECTION, SOLUTION INTRAMUSCULAR; INTRAVENOUS at 07:34

## 2021-03-22 RX ADMIN — DEXAMETHASONE SODIUM PHOSPHATE 10 MG: 4 INJECTION, SOLUTION INTRAMUSCULAR; INTRAVENOUS at 07:41

## 2021-03-22 RX ADMIN — OXYCODONE 10 MG: 5 TABLET ORAL at 18:17

## 2021-03-22 RX ADMIN — Medication 1 AMPULE: at 14:10

## 2021-03-22 RX ADMIN — ROCURONIUM BROMIDE 5 MG: 10 INJECTION INTRAVENOUS at 07:34

## 2021-03-22 RX ADMIN — DOCUSATE SODIUM - SENNOSIDES 1 TABLET: 50; 8.6 TABLET, FILM COATED ORAL at 14:10

## 2021-03-22 RX ADMIN — ROCURONIUM BROMIDE 45 MG: 10 INJECTION INTRAVENOUS at 07:56

## 2021-03-22 RX ADMIN — TRANEXAMIC ACID 1 G: 100 INJECTION, SOLUTION INTRAVENOUS at 07:44

## 2021-03-22 RX ADMIN — OXYCODONE 5 MG: 5 TABLET ORAL at 14:10

## 2021-03-22 RX ADMIN — HYDROMORPHONE HYDROCHLORIDE 0.5 MG: 2 INJECTION, SOLUTION INTRAMUSCULAR; INTRAVENOUS; SUBCUTANEOUS at 08:23

## 2021-03-22 RX ADMIN — PROPOFOL 150 MG: 10 INJECTION, EMULSION INTRAVENOUS at 07:34

## 2021-03-22 NOTE — PERIOP NOTES
TRANSFER - OUT REPORT:    Verbal report given to JOSEPH Mcmahon(name) on Erich Grumbles  being transferred to [facility](unit) for routine post - op       Report consisted of patients Situation, Background, Assessment and   Recommendations(SBAR). Information from the following report(s) OR Summary, Procedure Summary, Intake/Output and MAR was reviewed with the receiving nurse. Opportunity for questions and clarification was provided.       Patient transported with:   O2 @ 2 liters  Tech

## 2021-03-22 NOTE — PROGRESS NOTES
Ortho / Neurosurgery NP Note    POD# 0  s/p BILATERAL TOTAL HIP ARTHROPLASTY   Pt seen with no visitor present. Recently seen by Dr Rodrigo Vincent    Pt resting in bed. Drowsy, but appropriate in conversation   Reports expected post-op pain - recently medicated with prn oxycodone   Tolerating clear liquids. Reports some nausea, improving with Zofran     VSS Afebrile. 1L NC    Visit Vitals  /63 (BP 1 Location: Right upper arm, BP Patient Position: At rest)   Pulse (!) 54   Temp 97.8 °F (36.6 °C)   Resp 14   Ht 5' 5\" (1.651 m)   Wt 70.7 kg (155 lb 13.8 oz)   SpO2 98%   BMI 25.94 kg/m²       Voiding status: Donovan - d/c after therapy this evening   Output (mL)  Last Bowel Movement Date: (PTA) (03/22/21 1248)  Unmeasurable Output  Urine Occurrence(s): 1 (03/22/21 0655)      Labs    Lab Results   Component Value Date/Time    HGB 12.9 03/09/2021 10:10 AM      Lab Results   Component Value Date/Time    INR 1.0 03/09/2021 10:10 AM      Lab Results   Component Value Date/Time    Sodium 135 (L) 03/09/2021 10:10 AM    Potassium 4.3 03/09/2021 10:10 AM    Chloride 102 03/09/2021 10:10 AM    CO2 30 03/09/2021 10:10 AM    Glucose 98 03/09/2021 10:10 AM    BUN 25 (H) 03/09/2021 10:10 AM    Creatinine 0.68 03/09/2021 10:10 AM    Calcium 9.3 03/09/2021 10:10 AM     Recent Glucose Results: No results found for: GLU, GLUPOC, GLUCPOC        Body mass index is 25.94 kg/m². : A BMI > 30 is classified as obesity and > 40 is classified as morbid obesity. Bilaterally steristrips & tegaderm dressing c.d.i  Cryotherapy in place over incision  Calves soft and supple; No pain with passive stretch  Sensation and motor intact - PF/DF/EHL intact   SCDs for mechanical DVT proph while in bed     PLAN:  1) PT BID, OT - WBAT. Has RW. Patient lives alone with limited support, need rehab at discharge. 2) Aspirin 325 mg PO BID for DVT Prophylaxis   3) GI Prophylaxis - Pepcid   4) HTN - BP stable. Resume atenolol and htz as BP allows.    5) Readniess for discharge:     [x] Vital Signs stable    [] Hgb stable    [] + Voiding    [x] Wound intact, drainage minimal    [x] Tolerating PO intake     [] Cleared by PT (OT if applicable)     [] Stair training completed (if applicable)    [] Independent / Contact Guard Assist (household distance)     [] Bed mobility     [] Car transfers     [] ADLs    [x] Adequate pain control on oral medication alone     Mobilize today, pool out after therapy. COVID test ordered for rehab placement.      Sushma Ro NP

## 2021-03-22 NOTE — PROGRESS NOTES
Transition of Care Plan:  RUR: 5%  Disposition: rehab- [facility]   Follow up appointments: ortho  DME needed: n/a  Transportation at Discharge: likely BLS  [place] or means to access home: n/a    IM Medicare letter: to be provided prior to d/c  Caregiver Contact: Awa Avery friend [phone]  Discharge Caregiver contacted prior to discharge? Reason for Admission:  Bilateral total hip arthroplasties                    RUR Score: 5%                    Plan for utilizing home health:  Per recommendation         PCP: First and Last name:  Wilfrido Oseguera NP   Name of Practice: unable to recall    Are you a current patient: Yes/No: yes   Approximate date of last visit: march 2nd 2021    Can you participate in a virtual visit with your PCP: yes                    Current Advanced Directive/Advance Care Plan: Ric Rosen (ACP) Conversation      Date of Conversation: 3/23/21  Conducted with: Patient with Decision Making Capacity    Content/Action Overview:   DECLINED ACP conversation - will revisit periodically     Healthcare Decision Maker:   Click here to complete [address] including selection of the Healthcare Decision Maker Relationship (ie \"Primary\")                  Transition of Care Plan:                      CM made room visit with patient who was alert and oriented. Pt confirmed demographics, insurance, and emergency contact on file. Pt lives alone in a single level home with 5 sharita. Pt has a supportive friend, Awa Avery, that lives nearby. Prior to surgery patient was unable to clean home and unable to get in and out of shower tub, so patient did bird baths in sink. Pt does limited driving. Pt has no hx of HH but has been to inpatient rehab at [facility]. NILS spoke with patient regarding d/c planning. MD would like for patient to go to inpatient rehab.  Patient requested referral be sent to [facility]. FOC signed and placed on bedside chart. Referral sent to [facility] and waiting for response. Care Management Interventions  PCP Verified by CM: Yes  Last Visit to PCP: 03/02/21  Mode of Transport at Discharge:  Other (see comment)  Transition of Care Consult (CM Consult): Discharge Planning  Discharge Durable Medical Equipment: No  Physical Therapy Consult: Yes  Occupational Therapy Consult: Yes  Speech Therapy Consult: No  Current Support Network: Lives Alone, Own Home(pt lives alone in single level home with 5 sharita)  Confirm Follow Up Transport: Family  The Plan for Transition of Care is Related to the Following Treatment Goals : IPR  The Patient and/or Patient Representative was Provided with a Choice of Provider and Agrees with the Discharge Plan?: Yes  Freedom of Choice List was Provided with Basic Dialogue that Supports the Patient's Individualized Plan of Care/Goals, Treatment Preferences and Shares the Quality Data Associated with the Providers?: Yes  Discharge Location  Discharge Placement: Rehab hospital/unit [facility]    Ra Hylton, [facility]

## 2021-03-22 NOTE — PROGRESS NOTES
End of Shift Note    Bedside shift change report given to Pan Wiggins RN (oncoming nurse) by Millie Mars RN (offgoing nurse). Report included the following information SBAR, Kardex, MAR and Recent Results    Shift worked:  7-3     Shift summary and any significant changes:     admitted     Concerns for physician to address:       Zone phone for oncoming shift:          Activity:  Activity Level: Bed Rest  Number times ambulated in hallways past shift: 0  Number of times OOB to chair past shift: 0    Cardiac:   Cardiac Monitoring: No      Cardiac Rhythm: Normal sinus rhythm, Sinus bradycardia    Access:   Current line(s): PIV     Genitourinary:   Urinary status: pool    Respiratory:   O2 Device: Room air  Chronic home O2 use?: NO  Incentive spirometer at bedside: N/A     GI:  Last Bowel Movement Date: (PTA)  Current diet:  DIET REGULAR  Passing flatus: NO  Tolerating current diet: YES       Pain Management:   Patient states pain is manageable on current regimen: YES    Skin:  Declan Score: 17  Interventions: float heels, increase time out of bed, PT/OT consult and nutritional support     Patient Safety:  Fall Score:  Total Score: 3  Interventions: assistive device (walker, cane, etc), gripper socks, pt to call before getting OOB, stay with me (per policy) and gait belt  High Fall Risk: Yes    Length of Stay:  Expected LOS: 1d 19h  Actual LOS: 0      Millie Mars RN

## 2021-03-22 NOTE — ANESTHESIA POSTPROCEDURE EVALUATION
Procedure(s):  BILATERAL TOTAL HIP ARTHROPLASTY. general    Anesthesia Post Evaluation      Multimodal analgesia: multimodal analgesia used between 6 hours prior to anesthesia start to PACU discharge  Patient location during evaluation: bedside  Patient participation: complete - patient participated  Level of consciousness: awake  Pain management: adequate  Airway patency: patent  Anesthetic complications: no  Cardiovascular status: acceptable  Respiratory status: acceptable  Hydration status: acceptable  Post anesthesia nausea and vomiting:  controlled  Final Post Anesthesia Temperature Assessment:  Normothermia (36.0-37.5 degrees C)      INITIAL Post-op Vital signs:   Vitals Value Taken Time   /50 03/22/21 1218   Temp 37.1 °C (98.8 °F) 03/22/21 1131   Pulse 57 03/22/21 1225   Resp 15 03/22/21 1225   SpO2 100 % 03/22/21 1225   Vitals shown include unvalidated device data.

## 2021-03-22 NOTE — PERIOP NOTES
x2- Irrisept Wound Debridement and Cleansing System  Ref: Darryn Priest: [id number] LOT: [id number] Expiration Date: 11/30/2022

## 2021-03-22 NOTE — ANESTHESIA PREPROCEDURE EVALUATION
Relevant Problems   No relevant active problems       Anesthetic History   No history of anesthetic complications            Review of Systems / Medical History  Patient summary reviewed and pertinent labs reviewed    Pulmonary  Within defined limits                 Neuro/Psych   Within defined limits           Cardiovascular    Hypertension          Hyperlipidemia    Exercise tolerance: >4 METS  Comments: EKG  Sinus rhythm   Incomplete right bundle branch block    GI/Hepatic/Renal  Within defined limits              Endo/Other      Hypothyroidism: well controlled  Arthritis     Other Findings   Comments: Vitamin D deficiency           Physical Exam    Airway  Mallampati: I  TM Distance: > 6 cm  Neck ROM: normal range of motion   Mouth opening: Normal     Cardiovascular  Regular rate and rhythm,  S1 and S2 normal,  no murmur, click, rub, or gallop  Rhythm: regular  Rate: normal         Dental    Dentition: Caps/crowns     Pulmonary  Breath sounds clear to auscultation               Abdominal  GI exam deferred       Other Findings            Anesthetic Plan    ASA: 2  Anesthesia type: general    Monitoring Plan: BIS      Induction: Intravenous  Anesthetic plan and risks discussed with: Patient

## 2021-03-22 NOTE — PROGRESS NOTES
Occupational Therapy Note:    Orders received, chart reviewed. Spoke with PT who met with ortho NP and recommendation is to hold therapy assessments today given Pts increased pain level and nausea. Will follow back tomorrow to initiate OT evaluation as appropriate.      Thank you,    Wallie Olszewski, OTR/L

## 2021-03-22 NOTE — DISCHARGE INSTRUCTIONS
Discharge Instructions: How to [address]  Surgery: Total Hip Replacement  Surgeon:   Sheryl Sinha DO  Surgery Date:  3/22/2021     To relieve pain:   Use ice/gel packs.  Put the ice pack directly over the wound, or anywhere you are hurting or swollen.  To control pain and swelling, keep ice on regularly, especially after physical activity.  The packs should stay cold for 3-4 hours. When it is not cold anymore, rotate with the packs in the freezer.  Elevate your leg. This will also keep swelling down.  Rest for at least 20 minutes between activity or exercises.  To keep track of your pain medications, write down what you take and when you take it.  The last dose of pain medication you got in the hospital was:       Medication    Dose    Date & Time           Choose your medications based on the pain scale below:     To keep your pain under control, take Tylenol every 6 hours for 14 days - even if you feel like you dont need it.  For mild to moderate pain (4-6 on pain scale), take one pain pill every 3-4 hours as needed.  For severe pain (7-10 on pain scale), take two pain pills every 3-4 hours as needed.  To prevent nausea, take your pain medications with food. Pain Scale              As your pain lessens:     Slowly start taking less pain medication. You may do this by waiting longer between doses or by taking smaller doses.  Stop using the pain medications as soon as you no longer need it, usually in 2-3 weeks.  Aspirin   To prevent blood clots, you will need to take Aspirin 325 mg twice a day for 30 days.  To prevent stomach upset or bleeding:   Do not take non-steroidal anti-inflammatory medications (Ibuprofen, Advil, Motrin, Naproxen, etc.)    Take Pepcid 20 mg twice a day, or a similar home medication, while you are taking a blood thinner. STERI-STRIPS AND TEGADERM/CLEAR DRESSING INSTRUCTIONS      Keep your clear, waterproof dressing in place after your leave the hospital.       Leave the original dressing in place. Your surgeon will remove the dressing at your follow-up appointment.  You will have some swelling, warmth, and bruising around the incision and up and down the leg after surgery. This will may get worse in the first few days you are home and will slowly get better over the next few weeks.  DO NOTs:   Do not rub your surgical wound   Do not put lotion or oils on your wound.  Do not take a tub bath or go swimming until your doctor says it is ok.  You may shower with this dressing over your wound.  After showering pat the dressing dry.  If you have staples a home health nurse will remove them in about 10 days.  To increase and promote healing:     Stop Smoking (or at least cut back on       Smoking).  Eat a well-balanced diet (high in protein       and vitamin C).  If you have a poor appetite, drink Ensure, Glucerna, or [place] Instant Breakfast for the next 30 days.  If you are diabetic, you should control your blood                                                sugars to prevent infection and help your wound                                                to heal.     Prevent Infection    1. Wash your hands.  This is the most important thing you or your caregiver can do.  Wash your hands with soap and water (or use the hand  we gave you) before you touch any wounds. 2. Shower.  Use the antibacterial soap we gave you when you take a shower.  Shower with this soap until your wounds are healed. 3.  Use clean sheets.  Put freshly cleaned sheets on your bed after surgery.  To keep the surgery site clean, do not allow pets to sleep with you while your wound is still healing.                       To prevent constipation, stay active and drink plenty of fluid.  While using pain medications, you should also take stool softeners and laxatives, such as Pericolace       and Miralax.  If you are having too many bowel       Movements, then you may need       to stop taking the laxatives.  You should have a bowel movement 3-4 days        after surgery and then at least every other day while       on pain medication.  To improve your recovery, you must be active!  Use your walker and take short walks         (in your home). about every 2 hours during the day.  Try to increase how far you walk each day.  You can put as much weight on your leg as you can tolerate while walking.  Home health physical therapy will come to your       home the day after you leave the hospital.  The       therapist will visit about 4 times over the next couple of       weeks to teach you how to get out of bed, to safely walk       in your home, and to do your exercises.  NO DRIVING until your surgeon tells you it is ok.  You can return to work when cleared by a physician.  Please call your physician immediately if you have:     Constant bleeding from your wound.  Increasing redness or swelling around your wound (Some warmth, bruising and swelling is normal).  Change in wound drainage (increase in amount, color, or bad smell).  Change in mental status (unusual behavior   Temperature over 101.5 degrees Fahrenheit      Pain or redness in the calf (back of your lower leg - see picture)     Increased swelling of the thigh, ankle, calf, or foot.  Emergency: CALL 911 if you have:     Shortness of breath     Chest pain when you cough or taking a deep breath     Please call your surgeons office at [phone] for a follow up appointment. _   You should call as soon as you get home or the next day after discharge. Ask to make an appointment in 2 weeks.                      If you have questions or concerns during normal business hours, you may reach Dr. Roel Mtz at [phone].

## 2021-03-22 NOTE — OP NOTES
Date of Procedure: 3/22/2021  PREOPERATIVE DIAGNOSIS: Bilateral hip osteoarthritis. POSTOPERATIVE DIAGNOSIS: same  OPERATION: Right total hip arthroplasty. ASSISTANT SURGEON: none  ANESTHESIA: general.  ESTIMATED BLOOD LOSS: 450 mL. COMPLICATIONS: None. SPECIMEN: None. DRAINS: None. IMPLANTS:   Right:    Katy Trident hemispherical acetabular shell 54 mm outer diameter   Katy trident X3 0 degree polyethylene insert 36 mm inner diameter   Biolox Delta ceramic v40 femoral head 36 mm outer diameter  +0 Neck length   Accolade 2, 127 degree neck angle, hip stem size 2 with a V40 taper. Two 6.5 mm bone screws were added, 30 mm and 15 mm. Left:    Katy Trident Tritanium hemispherical Revision acetabular shell 54 mm outer diameter   [place] trident X3 0 degree polyethylene insert 36 mm inner diameter   Biolox Delta ceramic v40 femoral head 36 mm outer diameter  +2.5 Neck length   Accolade 2, 127 degree neck angle, hip stem size 1 with a V40 taper. Four 6.5 mm bone screws were added, 30 mm and 3 x 15 mm. Increased services due to increased time on left side relative to standard total hip, due to bone loss and deformity, amounting to 45 minutes extra work intra-operatively. OPERATIVE INDICATIONS: This is a(an) 66 y.o. female who failed  nonoperative management of bilateral  hip osteoarthritis. We did discuss the risks  of surgery which include but are not limited to infection, nerve or blood  vessel damage, need for reoperation, disability, DVT, PE, postoperative pain,  swelling, stiffness, intra or postoperative fracture, leg length inequality    and postoperative dislocation, femoral and sciatic nerve palsies, lateral femoral  cutaneous nerve injury, wound healing complications, all other organ  disfunction. The patient did freely consent for surgery.      DESCRIPTION OF PROCEDURE IN DETAIL: After the correct sites were  identified by myself in the holding area, the patient was transported to the  surgical suite where after successful induction of general anesthesia, the  patient was transferred to the [facility] table where all bony prominences were  padded. The bilateral hips were then prepped and draped in usual sterile orthopedic  fashion. After an appropriate time out and confirmation the 2 grams of Ancef  had been infused prior to any incision, an incision was made beginning 2 cm  lateral to and distal to the ASIS directly laterally and distally by about 8 cm on the right hip. The tissues were dissected sharply down to fascia and strict hemostasis  was maintained with the use of electrocautery. The deep fascia was incised  sharply and the tensor of the fascia sophie was bluntly elevated and mobilized. A superolateral retractor was then placed. The deep fascia was divided and the ascending branch of the lateral femoral circumflex artery was identified, coagulated and divided. An inferior medial retractor was then placed giving good visualization of the anterior  capsule. The iliocapsularis was then elevated off of the anterior capsule  and an anterior column retractor was placed. A capsulectomy was performed  anteriorly. Once the capsulectomy was performed,  retractors were placed intra-articularly and an osteotomy was performed in  the femoral neck in line with the preoperative template. Once this was  performed, the femoral head was removed with use of a powered corkscrew. The retractors were placed inside of capsule, outside of labrum and the  circumferential labrectomy was performed. The Pulvinar was excised with use  of electrocautery. The medial tissues were infused with 0.5% Ropivacaine. Once this was performed, a small reamer was then used to medialize  the acetabulum to its true floor. With that, the reamer was increased in  size and then placed in the direction of the anatomic direction of the  acetabulum.  Further reaming was performed and care was taken to maintain good medial acetabular wall integrity. I reamed until the instrument had excellent  as well as a bed of bleeding cancellous bone. I, therefore, irrigated the acetabular recess and dried it with a lap and  impacted into place a final shell into place at   43 degrees of abduction as well as 15 degrees of anteversion under direct fluoroscopic visualization. I checked stability of the shell. With that, the  stability was tested and it was found to have an excellent scratch fit. I did want extra stabilish, and therefore placed 2 acetabular screws in the safe zone. I  therefore irrigated the wound and impacted into a place a 0 degree  polyethylene insert. This did have excellent engagement of the locking  mechanism. This was tested as well. The retractors were then removed and the leg was placed in extended, adducted, externally rotated position and retractors were placed about the proximal femur. The limited capsulotomy was performed at the greater trochanter and this did allow the proximal femur to  elevate up and out of the wound. I then used the box osteotome to clear off  metaphysical bone and the lateral cortical bone was then removed with the use of a rongeur. The canal entry broach was then used. Sequential broaching was then performed until I had good axial and rotational control. I used the calcar planer to clear off irregularities in the calcar. I trial reduced the hip with a 0 degree ball. X-rays were taken and  demonstrated excellent positioning of the femoral component. I redislocated the joint. I removed  the head and neck as well as the broach and impacted into place a  Final permanent Accolade 2  hip stem. This did have excellent axial and  rotational control. The calcar was checked and there were no cracks or other  bone deficiencies. I, therefore, trialed a ball which did have excellent  reconstitution of the length and offset.  I, therefore, redislocated, cleaned  and dried the trunnion and impacted into place our final ball with seven sharp blows of the mallet. This did have excellent fixation. I therefore relocated the joint. Final  x-rays were taken demonstrating good positioning of all components as well as  stability and no fractures were noted. Stability testing was performed with the leg at neutral, then externally rotated 90 degrees, then externally rotated at 45 degrees combined with leg extension. The hip was stable through this range of motion. The wound was then copiously  irrigated with normal saline mixed with Betadine. Soft tissues were then  infused with 0.5% Ropivacaine. The fascia was then closed with a running #1  Vicryl suture. Deep stitches were placed. Then, 2-0 Vicryl, 3-0 Monocryl,  Dermabond, and Steri-Strips were applied. A sterile dressing was applied. Attention was then turned to the left hip. An incision was made beginning 2 cm  lateral to and distal to the ASIS directly laterally and distally by about 8  cm. The tissues were dissected sharply down to fascia and strict hemostasis  was maintained with the use of electrocautery. The deep fascia was incised  sharply and the tensor of the fascia sophie was bluntly elevated and mobilized. A superolateral retractor was then placed. The deep fascia was divided and the ascending branch of the lateral femoral circumflex artery was identified, coagulated and divided. An inferior medial retractor was then placed giving good visualization of the anterior  capsule. The iliocapsularis was then elevated off of the anterior capsule  and an anterior column retractor was placed. A capsulectomy was performed  anteriorly. Once the capsulectomy was performed,  retractors were placed intra-articularly and an osteotomy was performed in  the femoral neck in line with the preoperative template. Once this was  performed, the femoral head was removed with use of a powered corkscrew. There was massive scarring, poor bone quality, and significant displacement of normal architecture.   The retractors were placed inside of capsule, outside of labrum and the  circumferential labrectomy was performed. I then had to remove the debris of fragmented anatomic acetabulum, scarring from the subluxation, and protect displaced anatomic structures.  The Pulvinar was excised with use  of electrocautery. The medial tissues were infused with 0.5% Ropivacaine.  Once this was performed, a small reamer was then used to medialize  the acetabulum to its true floor. I elected for a slightly high hip center, as there was no posterior wall stability. With that, the reamer was increased in  size and then placed in the direction of the anatomic direction of the  acetabulum. Further reaming was performed and care was taken to maintain good medial acetabular wall integrity. I reamed until the instrument had excellent  as well as a bed of bleeding cancellous bone. I, therefore, irrigated the acetabular recess and dried it with a lap and  impacted into place a final shell into place at   43 degrees of abduction as well as 15 degrees of anteversion under direct fluoroscopic visualization.  I checked stability of the shell.  With that, the  stability was tested and it was found to have an excellent scratch fit. Due to the poor bone and undercoverage, I placed four screws.  I  therefore irrigated the wound and impacted into a place a 0 degree  polyethylene insert. This did have excellent engagement of the locking  mechanism. This was tested as well.   The extra work on this left acetabular side amounte    The retractors were then removed and the leg was placed in extended, adducted, externally rotated position and retractors were placed about the proximal femur. The limited capsulotomy was performed at the greater trochanter and this did allow the proximal femur to  elevate up and out of the wound. I then used the box osteotome to clear off  metaphysical bone and the lateral cortical bone was then removed  with the use of a rongeur. The canal entry broach was then used. Sequential broaching was then performed until I had good axial and rotational control. I used the calcar planer to clear off irregularities in the calcar. I trial reduced the hip with a 0 degree ball. X-rays were taken and  demonstrated excellent positioning of the femoral component. I redislocated the joint. I removed  the head and neck as well as the broach and impacted into place a  Final permanent Accolade 2 hip stem. This did have excellent axial and  rotational control. The calcar was checked and there were no cracks or other  bone deficiencies. I, therefore, trialed a ball which did have excellent  reconstitution of the length and offset. I, therefore, redislocated, cleaned  and dried the trunnion and impacted into place our final ball with seven sharp blows of the mallet. This did have excellent fixation. I therefore relocated the joint. Final  x-rays were taken demonstrating good positioning of all components as well as  stability and no fractures were noted. Stability testing was performed with the leg at neutral, then externally rotated 90 degrees, then externally rotated at 45 degrees combined with leg extension. The hip was stable through this range of motion. The wound was then copiously  irrigated with normal saline mixed with Betadine. Soft tissues were then  infused with 0.5% Ropivacaine. The fascia was then closed with a running #1  Vicryl suture. Deep stitches were placed. Then, 2-0 Vicryl, 3-0 Monocryl,  Dermabond, and Steri-Strips were applied. A sterile dressing was applied. The patient was then taken off of the table and taken to PACU in stable  condition with no obvious intraoperative complications. POSTOPERATIVE PLAN: The patient will be weightbearing as tolerated. They will  have ecotrin for DVT prophylaxis for  1 month.   The patient will follow up in our office in 2 weeks and then 6 weeks for repeat clinical and  radiographic checks per our normal protocol.

## 2021-03-22 NOTE — PROGRESS NOTES
Problem: Mobility Impaired (Adult and Pediatric)  Goal: *Acute Goals and Plan of Care (Insert Text)  Description: FUNCTIONAL STATUS PRIOR TO ADMISSION: Patient uses RW at baseline, only ambulating short household distances. Has been doing sponge bathes since last summer. No longer drives and orders grocery through delivery service. HOME SUPPORT PRIOR TO ADMISSION: The patient lived alone with no local support. Has helpful neighbor that assists with home/yard management. Physical Therapy Goals  Initiated 3/22/2021    1. Patient will move from supine to sit and sit to supine , scoot up and down, and roll side to side in bed with supervision/set-up within 4 days. 2. Patient will perform sit to stand with supervision/set-up within 4 days. 3. Patient will ambulate with supervision/set-up for 50 feet with the least restrictive device within 4 days. 4. Patient will verbalize and demonstrate understanding of anterior precautions per protocol within 4 days. 5. Patient will perform home exercise program per protocol with modified independence within 4 days. Outcome: Not Met   PHYSICAL THERAPY EVALUATION  Patient: Kirk Andrews (48 y.o. female)  Date: 3/22/2021  Primary Diagnosis: Bilateral primary osteoarthritis of hip [M16.0]  Procedure(s) (LRB):  BILATERAL TOTAL HIP ARTHROPLASTY (Bilateral) Day of Surgery   Precautions:  Fall    ASSESSMENT  Based on the objective data described below, the patient presents with decreased LE strength, decreased activity tolerance, impaired transfers, decreased tolerance for WBing activity, increased pain (LLE>>RLE), and decreased overall independence following admission POD #0 BILAT MARYLIN, anterior approach. Mobility limited by nausea and pain during evaluation.  Patient completes supine<>sit min Ax, initially attempting toward L but unable to complete due to extreme pain in L hip and improved tolerance toward R, sit<>stand Min A x2, and able to take 4 side-step toward [facility] with FWW Cj HUIZAR. Recommend d/c to rehab facility as patient lives alone and has no local support, will not be able to safely manage in home environment due to bilat impairments. Current Level of Function Impacting Discharge (mobility/balance): Min A x2, limited ambulation     Functional Outcome Measure: The patient scored 40/100 on the Barthel Index outcome measure which is indicative of significantly impaired functional mobility. Other factors to consider for discharge: lives alone, limited mobility prior to sx     Patient will benefit from skilled therapy intervention to address the above noted impairments. PLAN :  Recommendations and Planned Interventions: bed mobility training, transfer training, gait training, therapeutic exercises, patient and family training/education and therapeutic activities      Frequency/Duration: Patient will be followed by physical therapy:  twice daily to address goals. Recommendation for discharge: (in order for the patient to meet his/her long term goals)  Therapy 3 hours per day 5-7 days per week    This discharge recommendation:  Has been made in collaboration with the attending provider and/or case management    IF patient discharges home will need the following DME: to be determined (TBD)         SUBJECTIVE:   Patient stated I just want to sleep but I know I need to move.     OBJECTIVE DATA SUMMARY:   HISTORY:    Past Medical History:   Diagnosis Date    Acquired hypothyroidism     Arthritis     Essential hypertension     History of mammogram 01/06/2021    History of Papanicolaou smear of cervix 01/2021    Pure hypercholesterolemia     patient denies hx    Vitamin D deficiency      Past Surgical History:   Procedure Laterality Date    COLONOSCOPY N/A 2/24/2021    COLONOSCOPY and EGD performed by Nolan Ruby MD at [facility] HX BREAST BIOPSY Left     Papiloma 1991    HX COLONOSCOPY  12/14/2017    HX COLONOSCOPY  11/01/2012    HX HYSTERECTOMY 06/01/1997    HX ORTHOPAEDIC  02/27/2020    laminectomy and bilateral foraminotomy    HX UROLOGICAL      Uretheral dilatation x 3       Personal factors and/or comorbidities impacting plan of care: HTN    Home Situation  Home Environment: Private residence  # Steps to Enter: 5  Rails to Enter: Yes  Hand Rails : Bilateral  One/Two Story Residence: One story  Living Alone: Yes  Support Systems: Friends \ neighbors  Patient Expects to be Discharged to[de-identified] Rehabilitation facility  Current DME Used/Available at Home: Pan Griffin, rolling(Reacher)  Tub or Shower Type: Shower    EXAMINATION/PRESENTATION/DECISION MAKING:   Critical Behavior:  Neurologic State: Alert, Drowsy  Orientation Level: Appropriate for age, Oriented X4  Cognition: Follows commands     Hearing: Auditory  Auditory Impairment: None  Hearing Aids/Status: Does not own  Range Of Motion:  AROM: Generally decreased, functional  PROM: Generally decreased, functional  Strength:    Strength: Generally decreased, functional     Tone & Sensation:   Tone: Normal  Sensation: Intact    Coordination:  Coordination: Within functional limits  Functional Mobility:  Bed Mobility:  Rolling: Minimum assistance  Supine to Sit: Minimum assistance;Assist x2  Sit to Supine: Moderate assistance;Assist x2;Minimum assistance  Scooting: Moderate assistance  Transfers:  Sit to Stand: Minimum assistance;Assist x2; Additional time  Stand to Sit: Minimum assistance; Additional time  Balance:   Sitting: Intact  Standing: Intact; With support  Standing - Static: Fair;Constant support  Standing - Dynamic : Not tested  Ambulation/Gait Training:  Distance (ft): 4 Feet (ft)(4 side steps toward [facility])  Assistive Device: Gait belt;Walker, rolling  Ambulation - Level of Assistance: Minimal assistance  Gait Abnormalities: Decreased step clearance; Antalgic  Right Side Weight Bearing: As tolerated  Left Side Weight Bearing: As tolerated  Base of Support: Shift to right;Widened  Speed/Ella: Shuffled  Step Length: Right shortened;Left shortened    Functional Measure:  Barthel Index:    Bathin  Bladder: 0  Bowels: 10  Groomin  Dressin  Feeding: 10  Mobility: 0  Stairs: 0  Toilet Use: 5  Transfer (Bed to Chair and Back): 5  Total: 40/100       The Barthel ADL Index: Guidelines  1. The index should be used as a record of what a patient does, not as a record of what a patient could do. 2. The main aim is to establish degree of independence from any help, physical or verbal, however minor and for whatever reason. 3. The need for supervision renders the patient not independent. 4. A patient's performance should be established using the best available evidence. Asking the patient, friends/relatives and nurses are the usual sources, but direct observation and common sense are also important. However direct testing is not needed. 5. Usually the patient's performance over the preceding 24-48 hours is important, but occasionally longer periods will be relevant. 6. Middle categories imply that the patient supplies over 50 per cent of the effort. 7. Use of aids to be independent is allowed. Hussain Herring., Barthel, DZulayW. ([id number]). Functional evaluation: the Barthel Index. [address] [facility] (14)2. Zafar Prince, KAITY.F, Landen Weaver., Aftab Rodriguez., Trice, [address] [place] [address] (). Measuring the change indisability after inpatient rehabilitation; comparison of the responsiveness of the Barthel Index and Functional Bath Measure. Journal of Neurology, Neurosurgery, and Psychiatry, 66(4), 153-870. Taiwo Oreilly, N.J.A, STACIE Schaefer, & Marina Alexandre M.A. (2004.) Assessment of post-stroke quality of life in cost-effectiveness studies: The usefulness of the Barthel Index and the EuroQoL-5D.  Quality of Life Research, 15, 878-35      Physical Therapy Evaluation Charge Determination   History Examination Presentation Decision-Making   MEDIUM  Complexity : 1-2 comorbidities / personal factors will impact the outcome/ POC  MEDIUM Complexity : 3 Standardized tests and measures addressing body structure, function, activity limitation and / or participation in recreation  MEDIUM Complexity : Evolving with changing characteristics  Other outcome measures Barthel Index 40/100  HIGH       Based on the above components, the patient evaluation is determined to be of the following complexity level: MEDIUM    Activity Tolerance:   Fair, SpO2 stable on RA and observed SOB with activity    After treatment patient left in no apparent distress:   Supine in bed, Call bell within reach and Side rails x 3    COMMUNICATION/EDUCATION:   The patients plan of care was discussed with: Registered nurse and Physician. Fall prevention education was provided and the patient/caregiver indicated understanding., Patient/family have participated as able in goal setting and plan of care. and Patient/family agree to work toward stated goals and plan of care.     Thank you for this referral.  Esperanza Yoo, PT   Time Calculation: 32 mins

## 2021-03-22 NOTE — PROGRESS NOTES
Patient arrives to room via bed. A&Ox4 but drowsy, c/o pain L>R, VSS on 2LNC. SCDs bilaterally. Dressing CDI bilaterally. Oriented to unit, room, and POC. Bed in lowest position, call bell within reach, and adequate lighting provided. C/o being hungry but nausea. Given Zofran ODT when available. Patient picking at regular lunch tray. Will wait a bit to give medications.

## 2021-03-22 NOTE — PERIOP NOTES
Handoff Report from Operating Room to PACU    Report received from NACHO Devi RN and Estefany Qureshi CRNA regarding Isael Barnes. Surgeon(s):  Raymundo Talbot DO  And Procedure(s) (LRB):  BILATERAL TOTAL HIP ARTHROPLASTY (Bilateral)  confirmed   with dressings discussed. Anesthesia type, drugs, patient history, complications, estimated blood loss, vital signs, intake and output, and last pain medication, lines, reversal medications and temperature were reviewed.

## 2021-03-23 LAB
ANION GAP SERPL CALC-SCNC: 6 MMOL/L (ref 5–15)
BUN SERPL-MCNC: 23 MG/DL (ref 6–20)
BUN/CREAT SERPL: 26 (ref 12–20)
CALCIUM SERPL-MCNC: 7.6 MG/DL (ref 8.5–10.1)
CHLORIDE SERPL-SCNC: 101 MMOL/L (ref 97–108)
CO2 SERPL-SCNC: 23 MMOL/L (ref 21–32)
CREAT SERPL-MCNC: 0.9 MG/DL (ref 0.55–1.02)
GLUCOSE SERPL-MCNC: 112 MG/DL (ref 65–100)
HGB BLD-MCNC: 9.4 G/DL (ref 11.5–16)
POTASSIUM SERPL-SCNC: 3.8 MMOL/L (ref 3.5–5.1)
SARS-COV-2, XPLCVT: NOT DETECTED
SODIUM SERPL-SCNC: 130 MMOL/L (ref 136–145)
SOURCE, COVRS: NORMAL

## 2021-03-23 PROCEDURE — 97116 GAIT TRAINING THERAPY: CPT

## 2021-03-23 PROCEDURE — 97530 THERAPEUTIC ACTIVITIES: CPT

## 2021-03-23 PROCEDURE — 94760 N-INVAS EAR/PLS OXIMETRY 1: CPT

## 2021-03-23 PROCEDURE — 80048 BASIC METABOLIC PNL TOTAL CA: CPT

## 2021-03-23 PROCEDURE — 97166 OT EVAL MOD COMPLEX 45 MIN: CPT | Performed by: OCCUPATIONAL THERAPIST

## 2021-03-23 PROCEDURE — 85018 HEMOGLOBIN: CPT

## 2021-03-23 PROCEDURE — 36415 COLL VENOUS BLD VENIPUNCTURE: CPT

## 2021-03-23 PROCEDURE — 65270000029 HC RM PRIVATE

## 2021-03-23 PROCEDURE — 74011250637 HC RX REV CODE- 250/637: Performed by: NURSE PRACTITIONER

## 2021-03-23 PROCEDURE — 97110 THERAPEUTIC EXERCISES: CPT

## 2021-03-23 PROCEDURE — 74011250637 HC RX REV CODE- 250/637: Performed by: ORTHOPAEDIC SURGERY

## 2021-03-23 PROCEDURE — 97535 SELF CARE MNGMENT TRAINING: CPT | Performed by: OCCUPATIONAL THERAPIST

## 2021-03-23 PROCEDURE — 77030041034 HC KT HIP PATT -B

## 2021-03-23 PROCEDURE — 74011250636 HC RX REV CODE- 250/636: Performed by: ORTHOPAEDIC SURGERY

## 2021-03-23 PROCEDURE — 74011000250 HC RX REV CODE- 250: Performed by: ORTHOPAEDIC SURGERY

## 2021-03-23 RX ORDER — AMOXICILLIN 250 MG
1 CAPSULE ORAL 2 TIMES DAILY
Qty: 28 TAB | Refills: 0 | Status: SHIPPED
Start: 2021-03-23 | End: 2021-04-06

## 2021-03-23 RX ORDER — OXYCODONE HYDROCHLORIDE 5 MG/1
5-10 TABLET ORAL
Qty: 60 TAB | Refills: 0 | Status: SHIPPED | OUTPATIENT
Start: 2021-03-23 | End: 2021-04-06

## 2021-03-23 RX ORDER — ACETAMINOPHEN 500 MG
1000 TABLET ORAL EVERY 6 HOURS
Qty: 112 TAB | Refills: 0 | Status: SHIPPED
Start: 2021-03-23 | End: 2021-04-06

## 2021-03-23 RX ORDER — ASPIRIN 325 MG
325 TABLET, DELAYED RELEASE (ENTERIC COATED) ORAL 2 TIMES DAILY
Qty: 60 TAB | Refills: 0 | Status: SHIPPED
Start: 2021-03-23 | End: 2021-04-22

## 2021-03-23 RX ORDER — POLYETHYLENE GLYCOL 3350 17 G/17G
17 POWDER, FOR SOLUTION ORAL DAILY
Qty: 14 PACKET | Refills: 0 | Status: SHIPPED
Start: 2021-03-24 | End: 2021-04-07

## 2021-03-23 RX ORDER — ATENOLOL 100 MG/1
100 TABLET ORAL DAILY
Status: DISCONTINUED | OUTPATIENT
Start: 2021-03-23 | End: 2021-03-24 | Stop reason: HOSPADM

## 2021-03-23 RX ORDER — FAMOTIDINE 20 MG/1
20 TABLET, FILM COATED ORAL 2 TIMES DAILY
Qty: 60 TAB | Refills: 0 | Status: SHIPPED
Start: 2021-03-23 | End: 2021-04-22

## 2021-03-23 RX ADMIN — Medication 10 ML: at 21:05

## 2021-03-23 RX ADMIN — ACETAMINOPHEN 1000 MG: 500 TABLET, FILM COATED ORAL at 00:17

## 2021-03-23 RX ADMIN — MULTIPLE VITAMINS W/ MINERALS TAB 1 TABLET: TAB at 09:04

## 2021-03-23 RX ADMIN — Medication 2000 UNITS: at 09:03

## 2021-03-23 RX ADMIN — Medication 2 TABLET: at 09:03

## 2021-03-23 RX ADMIN — OXYCODONE 10 MG: 5 TABLET ORAL at 21:04

## 2021-03-23 RX ADMIN — SODIUM CHLORIDE 125 ML/HR: 9 INJECTION, SOLUTION INTRAVENOUS at 05:27

## 2021-03-23 RX ADMIN — ASPIRIN 325 MG: 325 TABLET, COATED ORAL at 09:03

## 2021-03-23 RX ADMIN — OXYCODONE 10 MG: 5 TABLET ORAL at 09:03

## 2021-03-23 RX ADMIN — OXYCODONE 10 MG: 5 TABLET ORAL at 17:05

## 2021-03-23 RX ADMIN — FAMOTIDINE 20 MG: 20 TABLET ORAL at 17:05

## 2021-03-23 RX ADMIN — KETOROLAC TROMETHAMINE 15 MG: 30 INJECTION, SOLUTION INTRAMUSCULAR at 05:23

## 2021-03-23 RX ADMIN — LEVOTHYROXINE SODIUM 50 MCG: 0.05 TABLET ORAL at 07:30

## 2021-03-23 RX ADMIN — Medication 10 ML: at 05:23

## 2021-03-23 RX ADMIN — Medication 400 MG: at 09:04

## 2021-03-23 RX ADMIN — Medication 1 AMPULE: at 09:04

## 2021-03-23 RX ADMIN — Medication 10 ML: at 13:42

## 2021-03-23 RX ADMIN — OMEGA-3-ACID ETHYL ESTERS 1000 MG: 1 CAPSULE, LIQUID FILLED ORAL at 09:06

## 2021-03-23 RX ADMIN — ACETAMINOPHEN 1000 MG: 500 TABLET, FILM COATED ORAL at 17:05

## 2021-03-23 RX ADMIN — ACETAMINOPHEN 1000 MG: 500 TABLET, FILM COATED ORAL at 13:41

## 2021-03-23 RX ADMIN — KETOROLAC TROMETHAMINE 15 MG: 30 INJECTION, SOLUTION INTRAMUSCULAR at 00:17

## 2021-03-23 RX ADMIN — DOCUSATE SODIUM - SENNOSIDES 1 TABLET: 50; 8.6 TABLET, FILM COATED ORAL at 09:03

## 2021-03-23 RX ADMIN — DOCUSATE SODIUM - SENNOSIDES 1 TABLET: 50; 8.6 TABLET, FILM COATED ORAL at 17:05

## 2021-03-23 RX ADMIN — WATER 2 G: 1 INJECTION INTRAMUSCULAR; INTRAVENOUS; SUBCUTANEOUS at 03:39

## 2021-03-23 RX ADMIN — ATENOLOL 100 MG: 100 TABLET ORAL at 09:04

## 2021-03-23 RX ADMIN — FAMOTIDINE 20 MG: 20 TABLET ORAL at 09:03

## 2021-03-23 RX ADMIN — Medication 1 AMPULE: at 21:05

## 2021-03-23 RX ADMIN — KETOROLAC TROMETHAMINE 15 MG: 30 INJECTION, SOLUTION INTRAMUSCULAR at 13:42

## 2021-03-23 RX ADMIN — POLYETHYLENE GLYCOL 3350 17 G: 17 POWDER, FOR SOLUTION ORAL at 09:02

## 2021-03-23 RX ADMIN — ASPIRIN 325 MG: 325 TABLET, COATED ORAL at 17:05

## 2021-03-23 NOTE — PROGRESS NOTES
Occupational Therapy  Medical record reviewed and nurse cleared pt for therapy. Pt is requiring S for adl mobility with cues for safety and maintaining precautions. Needs generally min A for self care at this time. Pt has many questions and no support at home. Pt will benefit from rehab to increase safety and independence in preparation for discharge home. Full OT note to follow.

## 2021-03-23 NOTE — PROGRESS NOTES
Problem: Self Care Deficits Care Plan (Adult)  Goal: *Acute Goals and Plan of Care (Insert Text)  Description: FUNCTIONAL STATUS PRIOR TO ADMISSION: Pt lived alone. Pt reported generally independence in adls with assist from neighbor for outdoor work. She was sponge bathing and using a RW PTA. Pt able to ambulate short distances within her home. HOME SUPPORT: The patient lived alone with no local support. (assist from neighbor for outdoor work)    Occupational Therapy Goals  Initiated 3/23/2021     1. Patient will perform lower body dressing using kit of adaptive aids  at modified independence level within 7 days. 2. Patient will perform toilet transfers at modified independence level using Walkers, Type: Rolling Walker  within 7 days. 3. Patient will perform all aspects of toileting at modified independence level within 7 days. 4. Patient will demonstrate anterior hip precautions without cues during adls and mobility,  within 7 days. 5.  Patient will tolerate at least 8 minutes of standing adls using best equipment within 7 days. Outcome: Not Met   OCCUPATIONAL THERAPY EVALUATION  Patient: Mary Rodriguez (86 y.o. female)  Date: 3/23/2021  Primary Diagnosis: Bilateral primary osteoarthritis of hip [M16.0]  Procedure(s) (LRB):  BILATERAL TOTAL HIP ARTHROPLASTY (Bilateral) 1 Day Post-Op   Precautions:   Fall    ASSESSMENT  Based on the objective data described below, the patient presents with mildly decreased balance, generalized weakness, talkative nature verbalizing many questions and concerns re: ant Hip precautions during adls, and decreased endurance all  impairing independence and safety during adls/IADLs, and mobility following B MARYLIN (anterior approach) on POD 1. Pt is functioning below her generally independent baseline and will benefit from acute OT services.     Intensive rehab is recommended at discharge to facilitate independence and safety with ultimate plan to discharge home post rehab.      Current Level of Function Impacting Discharge (ADLs/self-care): minimal to moderate assist for adls. Supervision for short distance adl mobility--cues for safety and maintaining alignment in B MARYLIN    Functional Outcome Measure: The patient scored 65/100 on the BArthel Index outcome measure which is indicative of 35% decrease in adl independence. Pt will need assistance for IADLs. .      Other factors to consider for discharge: pt ambulated in household-short distances- using RW PTA. She had groceries delivered. Pt was not driving. B MARYLIN on POD 1     Patient will benefit from skilled therapy intervention to address the above noted impairments. PLAN :  Recommendations and Planned Interventions: self care training, functional mobility training, therapeutic exercise, balance training, therapeutic activities, endurance activities, patient education, and home safety training    Frequency/Duration: Patient will be followed by occupational therapy 5 times a week to address goals. Recommendation for discharge: (in order for the patient to meet his/her long term goals)  Therapy 3 hours per day 5-7 days per week    This discharge recommendation:  Has been made in collaboration with the attending provider and/or case management    IF patient discharges home will need the following DME: bedside commode for night time use, pt was issued kit of adaptive aids this date. SUBJECTIVE:   Patient stated I was going to do that .     OBJECTIVE DATA SUMMARY:   HISTORY:   Past Medical History:   Diagnosis Date    Acquired hypothyroidism     Arthritis     Essential hypertension     History of mammogram 01/06/2021    History of Papanicolaou smear of cervix 01/2021    Pure hypercholesterolemia     patient denies hx    Vitamin D deficiency      Past Surgical History:   Procedure Laterality Date    COLONOSCOPY N/A 2/24/2021    COLONOSCOPY and EGD performed by Elicia Horn MD at [address], [address] BIOPSY Left     Papiloma 1991    HX COLONOSCOPY  12/14/2017    HX COLONOSCOPY  11/01/2012    HX HYSTERECTOMY  06/01/1997    HX ORTHOPAEDIC  02/27/2020    laminectomy and bilateral foraminotomy    HX UROLOGICAL      Uretheral dilatation x 3       Expanded or extensive additional review of patient history:     Home Situation  Home Environment: Private residence  # Steps to Enter: 5  Rails to Enter: Yes  Hand Rails : Bilateral  One/Two Story Residence: One story  Living Alone: Yes  Support Systems: Friends \ neighbors  Patient Expects to be Discharged to[de-identified] Rehabilitation facility  Current DME Used/Available at Home: Donnamae Gregor, rolling(Reacher)  Tub or Shower Type: Shower    Hand dominance: Right    EXAMINATION OF PERFORMANCE DEFICITS:  Cognitive/Behavioral Status:  Neurologic State: Alert  Orientation Level: Appropriate for age  Cognition: Appropriate decision making; Follows commands  Perception: Appears intact  Perseveration: No perseveration noted  Safety/Judgement: Awareness of environment;Decreased awareness of need for safety; Fall prevention;Home safety    Skin: generally intact, surgical sites - R/L not viewed    Edema: as expected post surgery-using Ice packs    Hearing: Auditory  Auditory Impairment: None  Hearing Aids/Status: Does not own    Vision/Perceptual:                           Acuity: (did not formally assess)         Range of Motion:  BUEs:    AROM: Generally decreased, functional  PROM: Generally decreased, functional                      Strength:  BUEs:    Strength: Generally decreased, functional                Coordination:  Coordination: Within functional limits  Fine Motor Skills-Upper: Left Intact; Right Intact    Gross Motor Skills-Upper: Left Intact; Right Intact    Tone & Sensation:    Tone: Normal  Sensation: Intact                      Balance:  Sitting: Intact(unable to reach to feet for socks/shoes--provided hip kit and educated in using)  Standing: Intact; With support  Standing - Static: Constant support;Good  Standing - Dynamic : Good--needs cues for safety    Functional Mobility and Transfers for ADLs:  Bed Mobility:  Rolling: Stand-by assistance  Supine to Sit: Stand-by assistance  Sit to Supine: Minimum assistance; Moderate assistance  (assist for BLEs)  Scooting: Setup    Transfers:  Sit to Stand: Supervision  Stand to Sit: Supervision(cues for safe technique)  Bathroom Mobility: Supervision/set up(cues for safe technique)  Toilet Transfer : Supervision(cues for safety)  Assistive Device : Walker(grab bars)    ADL Assessment:  Patient recalled and demonstrated avoiding extreme planes of movement with Bilateral LE during ADLs and functional mobility with moderate cues. Pt had many questions and requested frequent reinforcement and praise. Feeding: Independent    Oral Facial Hygiene/Grooming: Supervision;Stand-by assistance;Setup    Bathing: Minimum assistance  (bed level--seated EOB)    Upper Body Dressing: Stand-by assistance;Setup    Lower Body Dressing: Moderate assistance(educated in using hip kit and importance of maintain alignment)    Toileting: Stand by assistance                ADL Intervention and task modifications:       Educated on using kit of adaptive aids during lower body adls. Pt required minimal assistance at this time. Educated in using RW safely when performing standing adls and reaching off base of support during adls/IADLs while maintaining proper hip alighnment. Cognitive Retraining  Safety/Judgement: Awareness of environment;Decreased awareness of need for safety; Fall prevention;Home safety      Dressing joint: Patient instructed and demonstrated to don/doff Bilateral LE ( MARYLIN) first/last  cues. Patient instructed and demonstrated to don all clothing while sitting prior to standing, sit to doff clothing off from knees to feet in order to facilitate fall prevention, pain management, and energy conservation .   Pt was educated in using hip kit of adaptive aids and maintaining alighnment during activities. Home safety: Patient instructed on home modifications and safety (raise height of ADL objects, appropriate height of chair surfaces, recliner safety, change of floor surfaces, clear pathways) to increase independence and fall prevention. Patient indicated understanding. Standing: Patient instructed and demonstrated to walk up to sink/counter top/surfaces, step into walker to increase safety of joint and fall prevention with Moderate Assistance/CUEs. Instructed to apply concept of hip contraindications to ADLs within the home (no extreme reaching across body to both sides, square off while using objects, slide objects along surfaces). Patient instructed to increase amount of time standing, observe standing position during ADLs in order to increase even weight bearing through bilateral LEs in order to increase independence with ADLs. Tub transfer:     Therapeutic Exercise:  Encouraged following through with PT exercises and be up to the bathroom and avoid using BSC during the day   Functional Measure:  Barthel Index:    Bathin  Bladder: 10  Bowels: 10  Groomin  Dressin  Feeding: 10  Mobility: 10  Stairs: 0  Toilet Use: 5  Transfer (Bed to Chair and Back): 10  Total: 65/100        The Barthel ADL Index: Guidelines  1. The index should be used as a record of what a patient does, not as a record of what a patient could do. 2. The main aim is to establish degree of independence from any help, physical or verbal, however minor and for whatever reason. 3. The need for supervision renders the patient not independent. 4. A patient's performance should be established using the best available evidence. Asking the patient, friends/relatives and nurses are the usual sources, but direct observation and common sense are also important. However direct testing is not needed.   5. Usually the patient's performance over the preceding 24-48 hours is important, but occasionally longer periods will be relevant. 6. Middle categories imply that the patient supplies over 50 per cent of the effort. 7. Use of aids to be independent is allowed. [place] Barthel, D.W. ([id number]). Functional evaluation: the Barthel Index. [facility] (14)2. Jackie Anderson george LESLEY Amado, Rehan Hunt., Jonna Rodriguez., [place], [facility] (1999). Measuring the change indisability after inpatient rehabilitation; comparison of the responsiveness of the Barthel Index and Functional [place] Measure. Journal of Neurology, Neurosurgery, and Psychiatry, 66(4), 568-721. Alisa Damian, N.J.A, STACIE Schaefer, & Xochitl Renae MVALENTINO. (2004.) Assessment of post-stroke quality of life in cost-effectiveness studies: The usefulness of the Barthel Index and the EuroQoL-5D. Quality of Life Research, 15, 920-42        Occupational Therapy Evaluation Charge Determination   History Examination Decision-Making   LOW Complexity : Brief history review  MEDIUM Complexity : 3-5 performance deficits relating to physical, cognitive , or psychosocial skils that result in activity limitations and / or participation restrictions MEDIUM Complexity : Patient may present with comorbidities that affect occupational performnce. Miniml to moderate modification of tasks or assistance (eg, physical or verbal ) with assesment(s) is necessary to enable patient to complete evaluation       Based on the above components, the patient evaluation is determined to be of the following complexity level: LOW   Pain Rating:  Did not rate pain. Painful expression noted when pt raising R LE during lower body adls seated    Activity Tolerance:   Fair and pt requested to rest in bed post tx session.       After treatment patient left in no apparent distress:    Supine in bed, Call bell within reach, and Side rails x 3    COMMUNICATION/EDUCATION:   The patients plan of care was discussed with: Physical therapist, Registered nurse, and Case management. Patient/family have participated as able in goal setting and plan of care. This patients plan of care is appropriate for delegation to SAB[facility].     Thank you for this referral.  Susan Herring, OTR/L  Time Calculation: 49 mins

## 2021-03-23 NOTE — PROGRESS NOTES
Ortho / Neurosurgery NP Note    POD# 1  s/p BILATERAL TOTAL HIP ARTHROPLASTY   Pt seen with no visitor present. Pt resting in bed. Awake and alert today. Reports expected post-op pain well controlled with prn oxycodone and tylenol  Tolerating regular diet. Denies nausea. VSS Afebrile. Room air    Visit Vitals  /64 (BP 1 Location: Right upper arm, BP Patient Position: At rest)   Pulse 79   Temp 97.5 °F (36.4 °C)   Resp 18   Ht 5' 5\" (1.651 m)   Wt 70.7 kg (155 lb 13.8 oz)   SpO2 98%   BMI 25.94 kg/m²       Voiding status: Donovan d/c now voiding   Output (mL)  Urine Voided: 400 ml (03/23/21 0334)  Last Bowel Movement Date: 03/21/21 (03/23/21 0024)  Unmeasurable Output  Urine Occurrence(s): 1 (03/22/21 0655)      Labs    Lab Results   Component Value Date/Time    HGB 9.4 (L) 03/23/2021 03:39 AM      Lab Results   Component Value Date/Time    INR 1.0 03/09/2021 10:10 AM      Lab Results   Component Value Date/Time    Sodium 130 (L) 03/23/2021 03:39 AM    Potassium 3.8 03/23/2021 03:39 AM    Chloride 101 03/23/2021 03:39 AM    CO2 23 03/23/2021 03:39 AM    Glucose 112 (H) 03/23/2021 03:39 AM    BUN 23 (H) 03/23/2021 03:39 AM    Creatinine 0.90 03/23/2021 03:39 AM    Calcium 7.6 (L) 03/23/2021 03:39 AM     Recent Glucose Results:   Lab Results   Component Value Date/Time     (H) 03/23/2021 03:39 AM           Body mass index is 25.94 kg/m². : A BMI > 30 is classified as obesity and > 40 is classified as morbid obesity. Bilaterally steristrips & tegaderm dressing c.d.i - expected mild bruising bilaterally   Cryotherapy in place over incision  Calves soft and supple; No pain with passive stretch  Sensation and motor intact - PF/DF/EHL intact   SCDs for mechanical DVT proph while in bed     PLAN:  1) PT BID, OT - WBAT. Has RW. Patient lives alone with limited support, need rehab at discharge. 2) Aspirin 325 mg PO BID for DVT Prophylaxis   3) GI Prophylaxis - Pepcid   4) HTN - BP stable.  Resume atenolol today, and htz as BP allows. 5) Readniess for discharge:     [x] Vital Signs stable    [x] Hgb stable    [x] + Voiding    [x] Wound intact, drainage minimal    [x] Tolerating PO intake     [] Cleared by PT (OT if applicable)     [] Stair training completed (if applicable)    [] Independent / Contact Guard Assist (household distance)     [] Bed mobility     [] Car transfers     [] ADLs    [x] Adequate pain control on oral medication alone     PT/OT. Medically stable for discharge to rehab. COVID test pending for rehab placement.      Bettina Lopez NP

## 2021-03-23 NOTE — PROGRESS NOTES
Bedside and Verbal shift change report given to [facility] Sheri (oncoming nurse) by America Marinelli (offgoing nurse). Report included the following information SBAR, Kardex, Intake/Output, MAR and Recent Results.

## 2021-03-23 NOTE — PROGRESS NOTES
Problem: Mobility Impaired (Adult and Pediatric)  Goal: *Acute Goals and Plan of Care (Insert Text)  Description: FUNCTIONAL STATUS PRIOR TO ADMISSION: Patient uses RW at baseline, only ambulating short household distances. Has been doing sponge bathes since last summer. No longer drives and orders grocery through delivery service. HOME SUPPORT PRIOR TO ADMISSION: The patient lived alone with no local support. Has helpful neighbor that assists with home/yard management. Physical Therapy Goals  Initiated 3/22/2021    1. Patient will move from supine to sit and sit to supine , scoot up and down, and roll side to side in bed with supervision/set-up within 4 days. 2. Patient will perform sit to stand with supervision/set-up within 4 days. 3. Patient will ambulate with supervision/set-up for 50 feet with the least restrictive device within 4 days. 4. Patient will verbalize and demonstrate understanding of anterior precautions per protocol within 4 days. 5. Patient will perform home exercise program per protocol with modified independence within 4 days. Outcome: Progressing Towards Goal    PHYSICAL THERAPY TREATMENT  Patient: Di Colmenares (12 y.o. female)  Date: 3/23/2021  Diagnosis: Bilateral primary osteoarthritis of hip [M16.0] <principal problem not specified>  Procedure(s) (LRB):  BILATERAL TOTAL HIP ARTHROPLASTY (Bilateral) 1 Day Post-Op  Precautions: Fall  Chart, physical therapy assessment, plan of care and goals were reviewed. ASSESSMENT  Patient continues with skilled PT services and is progressing towards goals. Pt was received sitting on the EOB with nursing and cleared by nursing to mobilize. She was able to stand with RW and ambulated down the wood with slowed gait. Progressed from step to step through pattern. She was returned to the chair and performed LE exercises, handout provided. Ice applied at the end of the session and encouraged to sit up for ~ 2 hours if possible.  Pt has no support at home. Current Level of Function Impacting Discharge (mobility/balance): CGA    Other factors to consider for discharge: lives alone, no support         PLAN :  Patient continues to benefit from skilled intervention to address the above impairments. Continue treatment per established plan of care. to address goals. Recommendation for discharge: (in order for the patient to meet his/her long term goals)  Therapy 3 hours per day 5-7 days per week    This discharge recommendation:  Has been made in collaboration with the attending provider and/or case management    IF patient discharges home will need the following DME: to be determined (TBD)       SUBJECTIVE:   Patient stated I have one of the best surgeons don't I?.    OBJECTIVE DATA SUMMARY:   Critical Behavior:  Neurologic State: Alert  Orientation Level: Appropriate for age, Oriented X4  Cognition: Follows commands     Functional Mobility Training:  Bed Mobility:   Session began and ended in sitting                 Transfers:  Sit to Stand: Contact guard assistance  Stand to Sit: Contact guard assistance                             Balance:  Sitting: Intact  Standing: Intact; With support  Ambulation/Gait Training:  Distance (ft): 100 Feet (ft)  Assistive Device: Gait belt;Walker, rolling  Ambulation - Level of Assistance: Contact guard assistance        Gait Abnormalities: Decreased step clearance; Antalgic        Base of Support: Widened     Speed/Ella: Shuffled; Slow  Step Length: Left shortened;Right shortened              Therapeutic Exercises: Ankle pumps, LAQ, seated marches, glute sets 5x each  Pain Ratin/10 with LAQ on the L otherwise 3/10 for all other mobility     Activity Tolerance:   Good    After treatment patient left in no apparent distress:   Sitting in chair and Call bell within reach    COMMUNICATION/COLLABORATION:   The patients plan of care was discussed with: Occupational therapist and Registered nurse. Therese Joshi, PT, DPT   Time Calculation: 32 mins

## 2021-03-23 NOTE — PROGRESS NOTES
Problem: Mobility Impaired (Adult and Pediatric)  Goal: *Acute Goals and Plan of Care (Insert Text)  Description: FUNCTIONAL STATUS PRIOR TO ADMISSION: Patient uses RW at baseline, only ambulating short household distances. Has been doing sponge bathes since last summer. No longer drives and orders grocery through delivery service. HOME SUPPORT PRIOR TO ADMISSION: The patient lived alone with no local support. Has helpful neighbor that assists with home/yard management. Physical Therapy Goals  Initiated 3/22/2021    1. Patient will move from supine to sit and sit to supine , scoot up and down, and roll side to side in bed with supervision/set-up within 4 days. 2. Patient will perform sit to stand with supervision/set-up within 4 days. 3. Patient will ambulate with supervision/set-up for 50 feet with the least restrictive device within 4 days. 4. Patient will verbalize and demonstrate understanding of anterior precautions per protocol within 4 days. 5. Patient will perform home exercise program per protocol with modified independence within 4 days. PHYSICAL THERAPY TREATMENT  Patient: Sulma Gonzalez (47 y.o. female)  Date: 3/23/2021  Diagnosis: Bilateral primary osteoarthritis of hip [M16.0] <principal problem not specified>  Procedure(s) (LRB):  BILATERAL TOTAL HIP ARTHROPLASTY (Bilateral) 1 Day Post-Op  Precautions: Fall  Chart, physical therapy assessment, plan of care and goals were reviewed. ASSESSMENT  Patient continues with skilled PT services and is progressing towards goals. Pt was received in supine and cleared to mobilize. She was able to come to the EOB to the  with increased time. Worked on transferring to [facility] without difficulty. She was able to ambulate in the wood. Required verbal cues for proper gait pattern for smoother ambulation. Noted accelerated pace which she needed cue to control for safety.  She was returned to her room and performed a few LE exercises as well as incentive spirometer. Left sitting up in the chair awaiting OT. Current Level of Function Impacting Discharge (mobility/balance): CGA/SBA    Other factors to consider for discharge: lives alone         PLAN :  Patient continues to benefit from skilled intervention to address the above impairments. Continue treatment per established plan of care. to address goals. Recommendation for discharge: (in order for the patient to meet his/her long term goals)  Therapy 3 hours per day 5-7 days per week, short stay    This discharge recommendation:  Has been made in collaboration with the attending provider and/or case management    IF patient discharges home will need the following DME: to be determined (TBD)       SUBJECTIVE:   Patient stated so do you want me to wear my shoes.     OBJECTIVE DATA SUMMARY:   Critical Behavior:  Neurologic State: Alert, Appropriate for age  Orientation Level: Appropriate for age, Oriented X4  Cognition: Appropriate decision making, Follows commands     Functional Mobility Training:  Bed Mobility:  Rolling: Stand-by assistance  Supine to Sit: Stand-by assistance     Scooting: Setup        Transfers:  Sit to Stand: Stand-by assistance  Stand to Sit: Stand-by assistance                             Balance:  Sitting: Intact  Standing: Intact; With support  Ambulation/Gait Training:  Distance (ft): 200 Feet (ft)  Assistive Device: Gait belt;Walker, rolling  Ambulation - Level of Assistance: Contact guard assistance;Stand-by assistance        Gait Abnormalities: Decreased step clearance; Antalgic        Base of Support: Widened     Speed/Ella: Slow  Step Length: Left shortened;Right shortened                 Therapeutic Exercises:   Glute sets, ankle pumps, quad sets 5x each  Pain Rating:  No major complaints     Activity Tolerance:   Good    After treatment patient left in no apparent distress:   Sitting in chair and Call bell within reach    COMMUNICATION/COLLABORATION:   The patients plan of care was discussed with: Occupational therapist and Registered nurse.      Cheryl Pickens, PT, DPT   Time Calculation: 39 mins

## 2021-03-24 VITALS
WEIGHT: 155.87 LBS | DIASTOLIC BLOOD PRESSURE: 87 MMHG | TEMPERATURE: 98.3 F | SYSTOLIC BLOOD PRESSURE: 123 MMHG | BODY MASS INDEX: 25.97 KG/M2 | OXYGEN SATURATION: 97 % | HEIGHT: 65 IN | HEART RATE: 77 BPM | RESPIRATION RATE: 18 BRPM

## 2021-03-24 PROCEDURE — 74011250637 HC RX REV CODE- 250/637: Performed by: NURSE PRACTITIONER

## 2021-03-24 PROCEDURE — 74011250637 HC RX REV CODE- 250/637: Performed by: ORTHOPAEDIC SURGERY

## 2021-03-24 PROCEDURE — 2709999900 HC NON-CHARGEABLE SUPPLY

## 2021-03-24 RX ORDER — ONDANSETRON 4 MG/1
4 TABLET, ORALLY DISINTEGRATING ORAL
Qty: 15 TAB | Refills: 0 | Status: SHIPPED
Start: 2021-03-24 | End: 2021-03-31

## 2021-03-24 RX ADMIN — ACETAMINOPHEN 1000 MG: 500 TABLET, FILM COATED ORAL at 05:47

## 2021-03-24 RX ADMIN — POLYETHYLENE GLYCOL 3350 17 G: 17 POWDER, FOR SOLUTION ORAL at 08:26

## 2021-03-24 RX ADMIN — ASPIRIN 325 MG: 325 TABLET, COATED ORAL at 08:23

## 2021-03-24 RX ADMIN — MULTIPLE VITAMINS W/ MINERALS TAB 1 TABLET: TAB at 08:25

## 2021-03-24 RX ADMIN — DOCUSATE SODIUM - SENNOSIDES 1 TABLET: 50; 8.6 TABLET, FILM COATED ORAL at 08:24

## 2021-03-24 RX ADMIN — OMEGA-3-ACID ETHYL ESTERS 1000 MG: 1 CAPSULE, LIQUID FILLED ORAL at 08:31

## 2021-03-24 RX ADMIN — FAMOTIDINE 20 MG: 20 TABLET ORAL at 08:25

## 2021-03-24 RX ADMIN — OXYCODONE 10 MG: 5 TABLET ORAL at 05:46

## 2021-03-24 RX ADMIN — ONDANSETRON 4 MG: 4 TABLET, ORALLY DISINTEGRATING ORAL at 08:24

## 2021-03-24 RX ADMIN — ACETAMINOPHEN 1000 MG: 500 TABLET, FILM COATED ORAL at 00:28

## 2021-03-24 RX ADMIN — Medication 2 TABLET: at 08:24

## 2021-03-24 RX ADMIN — Medication 10 ML: at 08:25

## 2021-03-24 RX ADMIN — LEVOTHYROXINE SODIUM 50 MCG: 0.05 TABLET ORAL at 08:25

## 2021-03-24 RX ADMIN — Medication 1 AMPULE: at 08:26

## 2021-03-24 RX ADMIN — ATENOLOL 100 MG: 100 TABLET ORAL at 08:25

## 2021-03-24 RX ADMIN — Medication 2000 UNITS: at 08:24

## 2021-03-24 RX ADMIN — Medication 400 MG: at 08:24

## 2021-03-24 NOTE — DISCHARGE SUMMARY
Ortho Discharge Summary    Patient ID:  Man Damico  [id number]  female  66 y.o.  1950    Admit date: 3/22/2021    Discharge date: 3/24/2021    Admitting Physician: Karla Haile DO     Consulting Physician(s):   Treatment Team: Attending Provider: Gaston Garcia DO; Care Manager: Genoveva Saenz    Date of Surgery:   3/22/2021     Preoperative Diagnosis:  BILATERAL PRIMARY OSTEOARTHRITIS    Postoperative Diagnosis:   BILATERAL PRIMARY OSTEOARTHRITIS    Procedure(s):   BILATERAL TOTAL HIP ARTHROPLASTY     Anesthesia Type:   General     Surgeon: Gaston Garcia DO                            HPI:  Pt is a 66 y.o. female who has a history of BILATERAL PRIMARY OSTEOARTHRITIS  with pain and limitations of activities of daily living who presents at this time for a BILATERAL TOTAL HIP ARTHROPLASTY following the failure of conservative management. PMH:   Past Medical History:   Diagnosis Date    Acquired hypothyroidism     Arthritis     Essential hypertension     History of mammogram 01/06/2021    History of Papanicolaou smear of cervix 01/2021    Pure hypercholesterolemia     patient denies hx    Vitamin D deficiency        Body mass index is 25.94 kg/m². : A BMI > 30 is classified as obesity and > 40 is classified as morbid obesity. Medications upon admission :   Prior to Admission Medications   Prescriptions Last Dose Informant Patient Reported? Taking? CALCIUM-MAGNESIUM-VITAMIN D2 PO 3/20/2021  Yes No   Sig: Take 2 Tabs by mouth two (2) times a day. atenoloL (TENORMIN) 100 mg tablet 3/21/2021 at 2000  No Yes   Sig: TAKE 1 TABLET BY MOUTH EVERY DAY   Patient taking differently: Take 100 mg by mouth nightly. celecoxib (CeleBREX) 100 mg capsule 3/15/2021 at Unknown time  Yes Yes   Sig: Take 100 mg by mouth two (2) times a day. cholecalciferol (VITAMIN D3) (2,000 UNITS /50 MCG) cap capsule 3/20/2021  Yes No   Sig: Take 1 Cap by mouth as needed.    estradioL (VIVELLE) 0.0375 mg/24 hr 3/22/2021 at Unknown time  Yes Yes   Si Patch by TransDERmal route two (2) days a week. Sundays and    fish oil-omega-3 fatty acids 300-500 mg cap 2021 at Unknown time  Yes Yes   Sig: Take 2 Tabs by mouth daily. glucosamine-chondroitin (ARTHX) 500-400 mg cap 2021 at Unknown time  Yes Yes   Sig: Take 3-5 Caps by mouth daily. hydroCHLOROthiazide (HYDRODIURIL) 25 mg tablet 3/21/2021 at Unknown time  No Yes   Sig: TAKE 1/2 TABLET BY MOUTH ONCE A DAY   Patient taking differently: Take 12.5 mg by mouth daily. lidocaine/me-sal/menthol/camph (CBD-KINGS WITH LIDOCAINE EX) 3/19/2021  Yes No   Sig: by Apply Externally route as needed. methocarbamoL (ROBAXIN) 500 mg tablet Not Taking at Unknown time  Yes No   Sig: Take 500 mg by mouth two (2) times daily as needed for Muscle Spasm(s). methocarbamoL (ROBAXIN) 750 mg tablet 3/20/2021  Yes No   Sig: Take 750 mg by mouth daily as needed for Muscle Spasm(s). synthroid 50 mcg tablet 3/22/2021 at 0450  No Yes   Sig: TAKE 1 TABLET BY MOUTH EVERY DAY   Patient taking differently: Take 50 mcg by mouth Daily (before breakfast). therapeutic multivitamin-minerals (THERAGRAN-M) tablet 3/20/2021  Yes No   Sig: Take 1 Tab by mouth daily. Facility-Administered Medications: None        Allergies: Allergies   Allergen Reactions    Nitrofurantoin Nausea and Vomiting        Hospital Course: The patient underwent surgery. Complications:  None; patient tolerated the procedure well. Was taken to the PACU in stable condition and then transferred to the ortho floor.       Perioperative Antibiotics:  Ancef     Postoperative Pain Management:  Oxycodone & Tylenol     DVT Prophylaxis: Aspirin 325 BID    Postoperative transfusions:    Number of units banked PRBCs =   none     Post Op complications: none    Hemoglobin at discharge:    Lab Results   Component Value Date/Time    HGB 9.4 (L) 2021 03:39 AM    INR 1.0 2021 10:10 AM       Steri stirps and tegarderm dressing remained clean, dry and intact. No significant erythema or swelling. Wound appears to be healing without any evidence of infection. Neurovascular exam found to be within normal limits. Physical Therapy started following surgery and participated in bed mobility, transfers and ambulation. Gait:  Gait  Base of Support: Widened  Speed/Ella: Slow  Step Length: Left shortened, Right shortened  Gait Abnormalities: Decreased step clearance, Antalgic  Ambulation - Level of Assistance: Contact guard assistance, Stand-by assistance  Distance (ft): 200 Feet (ft)  Assistive Device: Gait belt, Walker, rolling                   Discharged to:  Rehab - [facility]. Condition on Discharge:   stable    Discharge instructions:  - Anticoagulate with Aspirin 325 BID  - Take pain medications as prescribed  - Resume pre hospital diet      - Discharge activity: activity as tolerated  - Ambulate with assistive device as needed. - Weight bearing status - WBAT  - Wound Care Keep wound clean and dry. See discharge instruction sheet. -DISCHARGE MEDICATION LIST     Current Discharge Medication List      START taking these medications    Details   ondansetron (ZOFRAN ODT) 4 mg disintegrating tablet Take 1 Tab by mouth every six (6) hours as needed for Nausea or Vomiting for up to 7 days. Qty: 15 Tab, Refills: 0      acetaminophen (TYLENOL) 500 mg tablet Take 2 Tabs by mouth every six (6) hours for 14 days. Qty: 112 Tab, Refills: 0      aspirin delayed-release 325 mg tablet Take 1 Tab by mouth two (2) times a day for 30 days. Qty: 60 Tab, Refills: 0      famotidine (PEPCID) 20 mg tablet Take 1 Tab by mouth two (2) times a day for 30 days. Qty: 60 Tab, Refills: 0      oxyCODONE IR (ROXICODONE) 5 mg immediate release tablet Take 1-2 Tabs by mouth every four (4) hours as needed for Pain for up to 14 days.  Max Daily Amount: 60 mg.  Qty: 60 Tab, Refills: 0    Associated Diagnoses: Status post total replacement of both hips      polyethylene glycol (MIRALAX) 17 gram packet Take 1 Packet by mouth daily for 14 days. Qty: 14 Packet, Refills: 0      senna-docusate (PERICOLACE) 8.6-50 mg per tablet Take 1 Tab by mouth two (2) times a day for 14 days. Qty: 28 Tab, Refills: 0         CONTINUE these medications which have NOT CHANGED    Details   fish oil-omega-3 fatty acids 300-500 mg cap Take 2 Tabs by mouth daily. glucosamine-chondroitin (ARTHX) 500-400 mg cap Take 3-5 Caps by mouth daily. atenoloL (TENORMIN) 100 mg tablet TAKE 1 TABLET BY MOUTH EVERY DAY  Qty: 90 Tab, Refills: 1      synthroid 50 mcg tablet TAKE 1 TABLET BY MOUTH EVERY DAY  Qty: 90 Tab, Refills: 2      hydroCHLOROthiazide (HYDRODIURIL) 25 mg tablet TAKE 1/2 TABLET BY MOUTH ONCE A DAY  Qty: 45 Tab, Refills: 1      estradioL (VIVELLE) 0.0375 mg/24 hr 1 Patch by TransDERmal route two (2) days a week. Sundays and Thursdays      therapeutic multivitamin-minerals (THERAGRAN-M) tablet Take 1 Tab by mouth daily. CALCIUM-MAGNESIUM-VITAMIN D2 PO Take 2 Tabs by mouth two (2) times a day. lidocaine/me-sal/menthol/camph (CBD-KINGS WITH LIDOCAINE EX) by Apply Externally route as needed. cholecalciferol (VITAMIN D3) (2,000 UNITS /50 MCG) cap capsule Take 1 Cap by mouth as needed.          STOP taking these medications       celecoxib (CeleBREX) 100 mg capsule Comments:   Reason for Stopping:         methocarbamoL (ROBAXIN) 500 mg tablet Comments:   Reason for Stopping:         methocarbamoL (ROBAXIN) 750 mg tablet Comments:   Reason for Stopping:            per medical continuation form      -Follow up in office in 2 weeks      Signed:  Megan Barnett NP  Orthopaedic Nurse Practitioner    3/24/2021  9:51 AM

## 2021-03-24 NOTE — PROGRESS NOTES
Sbar report called to [facility] at [phone] to nurse [facility] with the opportunity for questions and clarification. pt going to room 2010.

## 2021-03-24 NOTE — PROGRESS NOTES
Ptt. D/ashley to [facility] via ambulance,v/s stable, pt. A&0 X4, no episodes of  distress noted ,offeded no c/o pain.

## 2021-03-24 NOTE — PROGRESS NOTES
Transition of Care Plan:  RUR: 5%  [facility]position: [facility]   Follow up appointments: ortho  DME needed: n/a  Transportation at Discharge: [facility] @ 11:00am  [place] or means to access home: n/a    IM Medicare letter: to be provided prior to d/c  Caregiver Contact: Casey Bejarano friend [phone]  Discharge Caregiver contacted prior to discharge? patient does not wish for CM to contact anyone regarding her transfer to [facility]    [facility] @ 11:00am  [facility] accepting physician Dr. Emanuel Agustin  Call report [phone] going to room 2010    NILS spoke with Elvie at [facility] who confirmed they can take patient this morning. Elvie provided d/c information above. NILS spoke with patient who requested S transportation arranged as she does not have anyone that can take her to facility and does not feel she can transport via car and sit for that length of time. [facility] transport arranged for. CM made patient, nurse, and facility aware of transport time. Patient does not wish for CM to contact anyone regarding her transfer to [facility].     SCOTTIE Pierce,   [phone]

## 2021-03-24 NOTE — PROGRESS NOTES
Ortho / Neurosurgery NP Note    POD# 1  s/p BILATERAL TOTAL HIP ARTHROPLASTY   Pt seen with no visitor present. Pt seen with Dr Saleem Friend     Pt seen resting in bed, in NAD. Reports expected post-op pain well controlled with prn oxycodone and tylenol  Ambulating to bathroom overnight. Tolerating activity well. Making great progress with therapy. Tolerating regular diet. Reports intermittent nausea, fair appetite. Will add Ensure supplements. VSS Afebrile. Room air    Visit Vitals  BP (!) 112/97   Pulse 69   Temp 98.7 °F (37.1 °C)   Resp 16   Ht 5' 5\" (1.651 m)   Wt 70.7 kg (155 lb 13.8 oz)   SpO2 96%   BMI 25.94 kg/m²       Voiding status: voiding   Output (mL)  Urine Voided: 300 ml (03/23/21 1500)  Last Bowel Movement Date: 03/21/21 (03/24/21 0025)  Unmeasurable Output  Urine Occurrence(s): 1 (03/22/21 0655)      Labs    Lab Results   Component Value Date/Time    HGB 9.4 (L) 03/23/2021 03:39 AM      Lab Results   Component Value Date/Time    INR 1.0 03/09/2021 10:10 AM      Lab Results   Component Value Date/Time    Sodium 130 (L) 03/23/2021 03:39 AM    Potassium 3.8 03/23/2021 03:39 AM    Chloride 101 03/23/2021 03:39 AM    CO2 23 03/23/2021 03:39 AM    Glucose 112 (H) 03/23/2021 03:39 AM    BUN 23 (H) 03/23/2021 03:39 AM    Creatinine 0.90 03/23/2021 03:39 AM    Calcium 7.6 (L) 03/23/2021 03:39 AM     Recent Glucose Results:   No results found for: GLU, GLUPOC, GLUCPOC        Body mass index is 25.94 kg/m². : A BMI > 30 is classified as obesity and > 40 is classified as morbid obesity. Bilaterally steristrips & tegaderm dressing c.d.i - expected mild bruising and swelling bilaterally   Cryotherapy in place over incision  Calves soft and supple; No pain with passive stretch  Sensation and motor intact - PF/DF/EHL intact   SCDs for mechanical DVT proph while in bed     PLAN:  1) PT BID, OT - WBAT. Has RW. Patient lives alone with limited support, need rehab at discharge.    2) Aspirin 325 mg PO BID for DVT Prophylaxis   3) GI Prophylaxis - Pepcid   4) HTN - BP stable. Resume atenolol and htz. 5) Readniess for discharge:     [x] Vital Signs stable    [x] Hgb stable    [x] + Voiding    [x] Wound intact, drainage minimal    [x] Tolerating PO intake     [] Cleared by PT (OT if applicable)     [] Stair training completed (if applicable)    [] Independent / Contact Guard Assist (household distance)     [] Bed mobility     [] Car transfers     [] ADLs    [x] Adequate pain control on oral medication alone     COVID test negative for rehab placement. Medically stable for discharge to [facility].     Dieter Villalpando NP

## 2021-04-09 ENCOUNTER — TELEPHONE (OUTPATIENT)
Dept: FAMILY MEDICINE CLINIC | Age: 71
End: 2021-04-09

## 2021-04-12 DIAGNOSIS — Z47.1 AFTERCARE FOLLOWING RIGHT HIP JOINT REPLACEMENT SURGERY: ICD-10-CM

## 2021-04-12 DIAGNOSIS — Z96.641 AFTERCARE FOLLOWING RIGHT HIP JOINT REPLACEMENT SURGERY: ICD-10-CM

## 2021-04-12 DIAGNOSIS — Z96.642 AFTERCARE FOLLOWING LEFT HIP JOINT REPLACEMENT SURGERY: Primary | ICD-10-CM

## 2021-04-12 DIAGNOSIS — Z47.1 AFTERCARE FOLLOWING LEFT HIP JOINT REPLACEMENT SURGERY: Primary | ICD-10-CM

## 2021-04-14 ENCOUNTER — OFFICE VISIT (OUTPATIENT)
Dept: ORTHOPEDIC SURGERY | Age: 71
End: 2021-04-14

## 2021-04-14 VITALS
SYSTOLIC BLOOD PRESSURE: 157 MMHG | OXYGEN SATURATION: 98 % | BODY MASS INDEX: 27.49 KG/M2 | WEIGHT: 165 LBS | HEART RATE: 66 BPM | TEMPERATURE: 98.7 F | HEIGHT: 65 IN | DIASTOLIC BLOOD PRESSURE: 83 MMHG

## 2021-04-14 DIAGNOSIS — M16.0 BILATERAL PRIMARY OSTEOARTHRITIS OF HIP: Primary | ICD-10-CM

## 2021-04-14 DIAGNOSIS — Z09 POSTOPERATIVE EXAMINATION: ICD-10-CM

## 2021-04-14 PROCEDURE — 99024 POSTOP FOLLOW-UP VISIT: CPT | Performed by: ORTHOPAEDIC SURGERY

## 2021-04-14 RX ORDER — OXYCODONE HYDROCHLORIDE 5 MG/1
5 TABLET ORAL
Qty: 42 TAB | Refills: 0 | Status: SHIPPED | OUTPATIENT
Start: 2021-04-14 | End: 2021-05-17 | Stop reason: SDUPTHER

## 2021-04-14 RX ORDER — AMOXICILLIN AND CLAVULANATE POTASSIUM 500; 125 MG/1; MG/1
4 TABLET, FILM COATED ORAL ONCE
Qty: 4 TAB | Refills: 1 | Status: SHIPPED | OUTPATIENT
Start: 2021-04-14 | End: 2021-04-14

## 2021-04-14 NOTE — PROGRESS NOTES
is here for a follow up visit from a bilateral total hip arthroplasty. The patient is doing well, with no medical complications since discharge. Pain has been appropriate since surgery,and the patient is progressing well with physical therapy. Current Outpatient Medications on File Prior to Visit   Medication Sig Dispense Refill    aspirin delayed-release 325 mg tablet Take 1 Tab by mouth two (2) times a day for 30 days. 60 Tab 0    famotidine (PEPCID) 20 mg tablet Take 1 Tab by mouth two (2) times a day for 30 days. 60 Tab 0    therapeutic multivitamin-minerals (THERAGRAN-M) tablet Take 1 Tab by mouth daily.  CALCIUM-MAGNESIUM-VITAMIN D2 PO Take 2 Tabs by mouth two (2) times a day.  lidocaine/me-sal/menthol/camph (CBD-KINGS WITH LIDOCAINE EX) by Apply Externally route as needed.  fish oil-omega-3 fatty acids 300-500 mg cap Take 2 Tabs by mouth daily.  glucosamine-chondroitin (ARTHX) 500-400 mg cap Take 3-5 Caps by mouth daily.  atenoloL (TENORMIN) 100 mg tablet TAKE 1 TABLET BY MOUTH EVERY DAY (Patient taking differently: Take 100 mg by mouth nightly.) 90 Tab 1    synthroid 50 mcg tablet TAKE 1 TABLET BY MOUTH EVERY DAY (Patient taking differently: Take 50 mcg by mouth Daily (before breakfast). ) 90 Tab 2    hydroCHLOROthiazide (HYDRODIURIL) 25 mg tablet TAKE 1/2 TABLET BY MOUTH ONCE A DAY (Patient taking differently: Take 12.5 mg by mouth daily.) 45 Tab 1    cholecalciferol (VITAMIN D3) (2,000 UNITS /50 MCG) cap capsule Take 1 Cap by mouth as needed.  estradioL (VIVELLE) 0.0375 mg/24 hr 1 Patch by TransDERmal route two (2) days a week. Sundays and Thursdays       No current facility-administered medications on file prior to visit. ROS:  General: denies agitation, major chest pain, unexpected weakness  Pain in the hip is managed with occasional oxycodone, mostly tylenol. Skin: healing wound is without issue.    Strength: appropriate weakness of involved extremity is resolving since surgery      Physical Examination:    Blood pressure (!) 157/83, pulse 66, temperature 98.7 °F (37.1 °C), temperature source Tympanic, height 5' 5\" (1.651 m), weight 165 lb (74.8 kg), SpO2 98 %. Skin: no significant drainage, skin intact  Sensation intact to light touch at level of wound and distally. Lateral femoral cutaneous nerve function is intact   Strength is 4/5 with hip flexion and abduction  Leg lengths are clinically equal  Distal swelling is noted, but appropriate for postoperative course  Distal capillary refill less than 2 seconds      Imaging:    Postoperative xrays are reviewed, they indicate a bilateral total hip arthroplasty in excellent position without evidence of loosening or failure. Leg lengths are equal through the hip.        Assessment: Status post bilateral total hip arthroplasty    Plan:  Patient will continue physical therapy, with the goal of outpatient therapy and then home exercise program as soon as is possible  Wound care and dental prophylaxis instructions were reviewed  Continue to weight bear as tolerated without restriction, except to keep to the positions of comfort  Deep Venous Thrombosis Prophylaxis: ecotrin  Follow up will be 4 weeks,  Bilateral hip xrays on follow up

## 2021-04-14 NOTE — PROGRESS NOTES
Identified pt with two pt identifiers (name and ). Reviewed chart in preparation for visit and have obtained necessary documentation. Clare Bobo is a 66 y.o. female  Chief Complaint   Patient presents with    Surgical Follow-up     Bilateral Hip     Visit Vitals  BP (!) 157/83 (BP 1 Location: Right arm, BP Patient Position: Sitting, BP Cuff Size: Large adult)   Pulse 66   Temp 98.7 °F (37.1 °C) (Tympanic)   Ht 5' 5\" (1.651 m)   Wt 165 lb (74.8 kg)   SpO2 98%   BMI 27.46 kg/m²     1. Have you been to the ER, urgent care clinic since your last visit? Hospitalized since your last visit? No    2. Have you seen or consulted any other health care providers outside of the [address] [place] [address] since your last visit? Include any pap smears or colon screening.  No

## 2021-04-21 ENCOUNTER — OFFICE VISIT (OUTPATIENT)
Dept: FAMILY MEDICINE CLINIC | Age: 71
End: 2021-04-21
Payer: COMMERCIAL

## 2021-04-21 VITALS
WEIGHT: 163.38 LBS | RESPIRATION RATE: 16 BRPM | BODY MASS INDEX: 27.22 KG/M2 | DIASTOLIC BLOOD PRESSURE: 84 MMHG | HEART RATE: 78 BPM | HEIGHT: 65 IN | TEMPERATURE: 97.1 F | OXYGEN SATURATION: 97 % | SYSTOLIC BLOOD PRESSURE: 138 MMHG

## 2021-04-21 DIAGNOSIS — E66.3 OVERWEIGHT WITH BODY MASS INDEX (BMI) OF 27 TO 27.9 IN ADULT: ICD-10-CM

## 2021-04-21 DIAGNOSIS — I10 ESSENTIAL HYPERTENSION: Primary | ICD-10-CM

## 2021-04-21 PROCEDURE — 99213 OFFICE O/P EST LOW 20 MIN: CPT | Performed by: NURSE PRACTITIONER

## 2021-04-21 RX ORDER — SENNOSIDES 8.6 MG/1
CAPSULE, GELATIN COATED ORAL
COMMUNITY
End: 2021-10-21

## 2021-04-21 NOTE — PROGRESS NOTES
Subjective  Chief Complaint   Patient presents with    Follow-up     HTN     HPI:  Anand Levin is a 66 y.o. female. Patient presents for management of hypertension. Management of HTN-  Current meds: Hydrochlorothiazide 12.5 mg and atenolol 100 mg daily  Home BP readings: 130s/80  High salt intake: no  Stays hydrated: yes  Regular exercise: limited due to recent bilateral hip replacements  Denies lightheadedness, dizziness, headaches, chest pain, palpitations, lower extremity edema, and calf pain with exertion.     Past Medical History:   Diagnosis Date    Acquired hypothyroidism     Arthritis     Essential hypertension     History of mammogram 01/06/2021    History of Papanicolaou smear of cervix 01/2021    Pure hypercholesterolemia     patient denies hx    Vitamin D deficiency      Family History   Problem Relation Age of Onset    Breast Cancer Mother 80    Hypertension Mother     Colon Cancer Father     Arthritis-rheumatoid Father     Hypertension Father     Cancer Brother         stomach     Social History     Socioeconomic History    Marital status:      Spouse name: Not on file    Number of children: Not on file    Years of education: Not on file    Highest education level: Not on file   Occupational History    Not on file   Social Needs    Financial resource strain: Not on file    Food insecurity     Worry: Not on file     Inability: Not on file    Transportation needs     Medical: Not on file     Non-medical: Not on file   Tobacco Use    Smoking status: Former Smoker     Packs/day: 1.00     Years: 35.00     Pack years: 35.00    Smokeless tobacco: Never Used    Tobacco comment: Quit 4/1999   Substance and Sexual Activity    Alcohol use: Yes     Comment: ocassion    Drug use: Never    Sexual activity: Not on file   Lifestyle    Physical activity     Days per week: Not on file     Minutes per session: Not on file    Stress: Not on file   Relationships    Social connections     Talks on phone: Not on file     Gets together: Not on file     Attends Sikhism service: Not on file     Active member of club or organization: Not on file     Attends meetings of clubs or organizations: Not on file     Relationship status: Not on file    Intimate partner violence     Fear of current or ex partner: Not on file     Emotionally abused: Not on file     Physically abused: Not on file     Forced sexual activity: Not on file   Other Topics Concern    Not on file   Social History Narrative    Not on file     Current Outpatient Medications on File Prior to Visit   Medication Sig Dispense Refill    sennosides (Senna) 8.6 mg cap Take  by mouth.  oxyCODONE IR (ROXICODONE) 5 mg immediate release tablet Take 1 Tab by mouth every four (4) hours as needed for Pain for up to 7 days. Max Daily Amount: 30 mg. 42 Tab 0    aspirin delayed-release 325 mg tablet Take 1 Tab by mouth two (2) times a day for 30 days. 60 Tab 0    therapeutic multivitamin-minerals (THERAGRAN-M) tablet Take 1 Tab by mouth daily.  CALCIUM-MAGNESIUM-VITAMIN D2 PO Take 2 Tabs by mouth two (2) times a day.  fish oil-omega-3 fatty acids 300-500 mg cap Take 2 Tabs by mouth daily.  glucosamine-chondroitin (ARTHX) 500-400 mg cap Take 3-5 Caps by mouth daily.  atenoloL (TENORMIN) 100 mg tablet TAKE 1 TABLET BY MOUTH EVERY DAY (Patient taking differently: Take 100 mg by mouth nightly.) 90 Tab 1    synthroid 50 mcg tablet TAKE 1 TABLET BY MOUTH EVERY DAY (Patient taking differently: Take 50 mcg by mouth Daily (before breakfast). ) 90 Tab 2    hydroCHLOROthiazide (HYDRODIURIL) 25 mg tablet TAKE 1/2 TABLET BY MOUTH ONCE A DAY (Patient taking differently: Take 12.5 mg by mouth daily.) 45 Tab 1    cholecalciferol (VITAMIN D3) (2,000 UNITS /50 MCG) cap capsule Take 1 Cap by mouth as needed.  estradioL (VIVELLE) 0.0375 mg/24 hr 1 Patch by TransDERmal route two (2) days a week.  Sundays and Thursdays      famotidine (PEPCID) 20 mg tablet Take 1 Tab by mouth two (2) times a day for 30 days. 60 Tab 0    lidocaine/me-sal/menthol/camph (CBD-KINGS WITH LIDOCAINE EX) by Apply Externally route as needed. No current facility-administered medications on file prior to visit. Allergies   Allergen Reactions    Nitrofurantoin Nausea and Vomiting     ROS  See HPI for pertinent ROS. Objective  Physical Exam  Vitals signs and nursing note reviewed. Constitutional:       General: She is not in acute distress. Appearance: Normal appearance. She is overweight. HENT:      Head: Normocephalic. Eyes:      Extraocular Movements: Extraocular movements intact. Cardiovascular:      Rate and Rhythm: Normal rate and regular rhythm. Heart sounds: Normal heart sounds. Pulmonary:      Effort: Pulmonary effort is normal.      Breath sounds: Normal breath sounds. Musculoskeletal: Normal range of motion. Right lower leg: No edema. Left lower leg: No edema. Comments: Ambulates with walker   Skin:     General: Skin is warm and dry. Neurological:      Mental Status: She is alert and oriented to person, place, and time. Psychiatric:         Mood and Affect: Mood normal.         Behavior: Behavior normal.          Assessment & Plan      ICD-10-CM ICD-9-CM    1. Essential hypertension  I10 401.9    2. Overweight with body mass index (BMI) of 27 to 27.9 in adult  E66.3 278.02     Z68.27 V85.23      Diagnoses and all orders for this visit:    1. Essential hypertension  BP is below goal in the office today and on reported home readings. No medication changes. Continue to check BP readings regularly and call if consistently greater than 160/90 on either number. 2. Overweight with body mass index (BMI) of 27 to 27.9 in adult  Increase exercise as tolerated and eat a healthy diet.     Follow-up and Dispositions    · Return in about 6 months (around 10/21/2021) for wellness, fasting labs, follow up, hypertension, etc.           Radha Innbandar, NP

## 2021-04-27 ENCOUNTER — TELEPHONE (OUTPATIENT)
Dept: FAMILY MEDICINE CLINIC | Age: 71
End: 2021-04-27

## 2021-04-27 NOTE — TELEPHONE ENCOUNTER
Cheng Romero from [facility] [address] [place] [address] evaluated for OT and they will continue until she transitions to outpatient services.

## 2021-04-27 NOTE — TELEPHONE ENCOUNTER
Pt would like to know [facility] recommendations on receiving the COVID vaccine and whether or not it is to soon to receive the vaccine after having hip surgery 5 weeks ago

## 2021-05-10 DIAGNOSIS — Z47.89 ORTHOPEDIC AFTERCARE: Primary | ICD-10-CM

## 2021-05-11 ENCOUNTER — TELEPHONE (OUTPATIENT)
Dept: FAMILY MEDICINE CLINIC | Age: 71
End: 2021-05-11

## 2021-05-12 ENCOUNTER — OFFICE VISIT (OUTPATIENT)
Dept: ORTHOPEDIC SURGERY | Age: 71
End: 2021-05-12

## 2021-05-12 VITALS
HEIGHT: 65 IN | OXYGEN SATURATION: 98 % | HEART RATE: 63 BPM | BODY MASS INDEX: 27.32 KG/M2 | TEMPERATURE: 97.2 F | DIASTOLIC BLOOD PRESSURE: 84 MMHG | SYSTOLIC BLOOD PRESSURE: 138 MMHG | WEIGHT: 164 LBS

## 2021-05-12 DIAGNOSIS — Z47.1 AFTERCARE FOLLOWING RIGHT HIP JOINT REPLACEMENT SURGERY: Primary | ICD-10-CM

## 2021-05-12 DIAGNOSIS — Z96.641 AFTERCARE FOLLOWING RIGHT HIP JOINT REPLACEMENT SURGERY: Primary | ICD-10-CM

## 2021-05-12 PROCEDURE — 99024 POSTOP FOLLOW-UP VISIT: CPT | Performed by: ORTHOPAEDIC SURGERY

## 2021-05-12 NOTE — PROGRESS NOTES
Identified pt with two pt identifiers (name and ). Reviewed chart in preparation for visit and have obtained necessary documentation. Mir Escobedo is a 66 y.o. female  Chief Complaint   Patient presents with    Surgical Follow-up     Bilateral hip     Visit Vitals  /84 (BP 1 Location: Right arm, BP Patient Position: Sitting, BP Cuff Size: Large adult)   Pulse 63   Temp 97.2 °F (36.2 °C) (Tympanic)   Ht 5' 5\" (1.651 m)   Wt 164 lb (74.4 kg)   SpO2 98%   BMI 27.29 kg/m²     1. Have you been to the ER, urgent care clinic since your last visit? Hospitalized since your last visit? No    2. Have you seen or consulted any other health care providers outside of the [address] [place] [address] since your last visit? Include any pap smears or colon screening.  No

## 2021-05-12 NOTE — LETTER
5/12/2021 Patient: Des Rodriguez YOB: 1950 Date of Visit: 5/12/2021 Radha Stevenson NP 
[address] [place]  [address] [address] [employer] Dear Radha Stevenson NP, Thank you for referring Ms. Dee Mckeon to [facility] for evaluation. My notes for this consultation are attached. If you have questions, please do not hesitate to call me. I look forward to following your patient along with you.  
 
 
Sincerely, 
 
Imani Chavarria, DO

## 2021-05-13 NOTE — PROGRESS NOTES
is here for a follow up visit from a bilateral total hip arthroplasty. The patient is doing well, with no medical complications since discharge. Pain has been appropriate since surgery,and the patient is progressing well with physical therapy. Current Outpatient Medications on File Prior to Visit   Medication Sig Dispense Refill    sennosides (Senna) 8.6 mg cap Take  by mouth.  therapeutic multivitamin-minerals (THERAGRAN-M) tablet Take 1 Tab by mouth daily.  CALCIUM-MAGNESIUM-VITAMIN D2 PO Take 2 Tabs by mouth two (2) times a day.  lidocaine/me-sal/menthol/camph (CBD-KINGS WITH LIDOCAINE EX) by Apply Externally route as needed.  fish oil-omega-3 fatty acids 300-500 mg cap Take 2 Tabs by mouth daily.  glucosamine-chondroitin (ARTHX) 500-400 mg cap Take 3-5 Caps by mouth daily.  atenoloL (TENORMIN) 100 mg tablet TAKE 1 TABLET BY MOUTH EVERY DAY (Patient taking differently: Take 100 mg by mouth nightly.) 90 Tab 1    synthroid 50 mcg tablet TAKE 1 TABLET BY MOUTH EVERY DAY (Patient taking differently: Take 50 mcg by mouth Daily (before breakfast). ) 90 Tab 2    hydroCHLOROthiazide (HYDRODIURIL) 25 mg tablet TAKE 1/2 TABLET BY MOUTH ONCE A DAY (Patient taking differently: Take 12.5 mg by mouth daily.) 45 Tab 1    cholecalciferol (VITAMIN D3) (2,000 UNITS /50 MCG) cap capsule Take 1 Cap by mouth as needed.  estradioL (VIVELLE) 0.0375 mg/24 hr 1 Patch by TransDERmal route two (2) days a week. Sundays and Thursdays       No current facility-administered medications on file prior to visit. ROS:  General: denies agitation, major chest pain, unexpected weakness  Pain in the hip is managed with Tylenol. Skin: healing wound is without issue.     Strength: appropriate weakness of involved extremity is resolving since surgery      Physical Examination:    Blood pressure 138/84, pulse 63, temperature 97.2 °F (36.2 °C), temperature source Tympanic, height 5' 5\" (1.651 m), weight 164 lb (74.4 kg), SpO2 98 %. Skin: no significant drainage, skin intact  Sensation intact to light touch at level of wound and distally. Lateral femoral cutaneous nerve function is intact   Strength is 4/5 with hip flexion and abduction  Leg lengths are clinically equal  Distal swelling is noted, but appropriate for postoperative course  Distal capillary refill less than 2 seconds      Imaging:    Postoperative xrays are reviewed, they indicate a bilateral total hip arthroplasty in excellent position without evidence of loosening or failure. Leg lengths are equal through the hip.         Assessment: Status post bilateral total hip arthroplasty    Plan:  Patient will continue physical therapy, with the goal of outpatient therapy and then home exercise program as soon as is possible  Wound care and dental prophylaxis instructions were reviewed  Continue to weight bear as tolerated without restriction, except to keep to the positions of comfort  Deep Venous Thrombosis Prophylaxis: None  Follow up will be 6 weeks, no xrays on follow up

## 2021-05-17 DIAGNOSIS — M16.0 BILATERAL PRIMARY OSTEOARTHRITIS OF HIP: ICD-10-CM

## 2021-05-17 RX ORDER — OXYCODONE HYDROCHLORIDE 5 MG/1
5 TABLET ORAL
Qty: 42 TAB | Refills: 0 | Status: SHIPPED | OUTPATIENT
Start: 2021-05-17 | End: 2021-05-24

## 2021-05-20 RX ORDER — HYDROCHLOROTHIAZIDE 25 MG/1
TABLET ORAL
Qty: 45 TABLET | Refills: 1 | Status: SHIPPED | OUTPATIENT
Start: 2021-05-20 | End: 2021-11-08

## 2021-06-01 ENCOUNTER — TELEPHONE (OUTPATIENT)
Dept: FAMILY MEDICINE CLINIC | Age: 71
End: 2021-06-01

## 2021-06-01 NOTE — TELEPHONE ENCOUNTER
Oma Cortes from [facility] called to inform Eric Dowd that Patient was discharged today 06/01/2021 from program

## 2021-06-23 ENCOUNTER — OFFICE VISIT (OUTPATIENT)
Dept: ORTHOPEDIC SURGERY | Age: 71
End: 2021-06-23
Payer: COMMERCIAL

## 2021-06-23 VITALS
TEMPERATURE: 98.5 F | SYSTOLIC BLOOD PRESSURE: 132 MMHG | WEIGHT: 168 LBS | HEART RATE: 68 BPM | DIASTOLIC BLOOD PRESSURE: 82 MMHG | BODY MASS INDEX: 27.99 KG/M2 | OXYGEN SATURATION: 100 % | HEIGHT: 65 IN

## 2021-06-23 DIAGNOSIS — Z09 POSTOPERATIVE EXAMINATION: ICD-10-CM

## 2021-06-23 DIAGNOSIS — Z96.641 AFTERCARE FOLLOWING RIGHT HIP JOINT REPLACEMENT SURGERY: Primary | ICD-10-CM

## 2021-06-23 DIAGNOSIS — Z47.1 AFTERCARE FOLLOWING RIGHT HIP JOINT REPLACEMENT SURGERY: Primary | ICD-10-CM

## 2021-06-23 PROCEDURE — 99212 OFFICE O/P EST SF 10 MIN: CPT | Performed by: ORTHOPAEDIC SURGERY

## 2021-06-23 NOTE — PROGRESS NOTES
6/23/2021    Chief Complaint: Follow up for bilateral hip replacement    HPI: Siomara Baum is a 66 y.o.  who is now 3 months status post bilateral hip  arthroplasty. The patient states that they are doing well. The preoperative pain is gone and the postoperative pain is gone. Regular exercises have helped with residual symptoms. Past Medical History:   Diagnosis Date    Acquired hypothyroidism     Arthritis     Essential hypertension     History of mammogram 01/06/2021    History of Papanicolaou smear of cervix 01/2021    Pure hypercholesterolemia     patient denies hx    Vitamin D deficiency        Past Surgical History:   Procedure Laterality Date    COLONOSCOPY N/A 2/24/2021    COLONOSCOPY and EGD performed by Baljit Guajardo MD at [facility] HX BREAST BIOPSY Left     Papiloma 1991    HX COLONOSCOPY  12/14/2017    HX COLONOSCOPY  11/01/2012    HX HIP REPLACEMENT Bilateral 03/22/2021    HX HYSTERECTOMY  06/01/1997    HX ORTHOPAEDIC  02/27/2020    laminectomy and bilateral foraminotomy    HX ORTHOPAEDIC  03/22/2021    bilateral hip replacement    HX UROLOGICAL      Uretheral dilatation x 3       Current Outpatient Medications on File Prior to Visit   Medication Sig Dispense Refill    hydroCHLOROthiazide (HYDRODIURIL) 25 mg tablet TAKE 1/2 TABLET BY MOUTH EVERY DAY 45 Tablet 1    sennosides (Senna) 8.6 mg cap Take  by mouth.  therapeutic multivitamin-minerals (THERAGRAN-M) tablet Take 1 Tab by mouth daily.  CALCIUM-MAGNESIUM-VITAMIN D2 PO Take 2 Tabs by mouth two (2) times a day.  lidocaine/me-sal/menthol/camph (CBD-KINGS WITH LIDOCAINE EX) by Apply Externally route as needed.  fish oil-omega-3 fatty acids 300-500 mg cap Take 2 Tabs by mouth daily.  glucosamine-chondroitin (ARTHX) 500-400 mg cap Take 3-5 Caps by mouth daily.       atenoloL (TENORMIN) 100 mg tablet TAKE 1 TABLET BY MOUTH EVERY DAY (Patient taking differently: Take 100 mg by mouth nightly.) 90 Tab 1    synthroid 50 mcg tablet TAKE 1 TABLET BY MOUTH EVERY DAY (Patient taking differently: Take 50 mcg by mouth Daily (before breakfast). ) 90 Tab 2    cholecalciferol (VITAMIN D3) (2,000 UNITS /50 MCG) cap capsule Take 1 Cap by mouth as needed.  estradioL (VIVELLE) 0.0375 mg/24 hr 1 Patch by TransDERmal route two (2) days a week. Sundays and Thursdays       No current facility-administered medications on file prior to visit. Allergies   Allergen Reactions    Nitrofurantoin Nausea and Vomiting       Family History   Problem Relation Age of Onset    Breast Cancer Mother 80    Hypertension Mother     Colon Cancer Father     Arthritis-rheumatoid Father     Hypertension Father     Cancer Brother         stomach       Social History     Socioeconomic History    Marital status:      Spouse name: Not on file    Number of children: Not on file    Years of education: Not on file    Highest education level: Not on file   Tobacco Use    Smoking status: Former Smoker     Packs/day: 1.00     Years: 35.00     Pack years: 35.00    Smokeless tobacco: Never Used    Tobacco comment: Quit 4/1999   Vaping Use    Vaping Use: Never used   Substance and Sexual Activity    Alcohol use: Yes     Comment: ocassion    Drug use: Never     Social Determinants of Health     Financial Resource Strain:     Difficulty of Paying Living Expenses:    Food Insecurity:     Worried About Running Out of Food in the Last Year:     Ran Out of Food in the Last Year:    Transportation Needs:     Lack of Transportation (Medical):      Lack of Transportation (Non-Medical):    Physical Activity:     Days of Exercise per Week:     Minutes of Exercise per Session:    Stress:     Feeling of Stress :    Social Connections:     Frequency of Communication with Friends and Family:     Frequency of Social Gatherings with Friends and Family:     Attends Ep[employer]opalian Services:     Active Member of Clubs or Organizations:     Attends Club or Organization Meetings:     Marital Status:          Review of Systems:       General: Denies headache, lethargy, fever, weight loss  Ears/Nose/Throat: Denies ear discharge, drainage, nosebleeds, hoarse voice, dental problems  Cardiovascular: Denies chest pain, shortness of breath  Lungs: Denies chest pain, breathing problems, wheezing, pneumonia  Stomach: Denies stomach pain, heartburn, constipation, irritable bowel  Skin: Denies rash, sores, open wounds  Musculoskeletal: Bilateral hip replacements  Genitourinary: Denies dysuria, hematuria, polyuria  Gastrointestinal: Denies constipation, obstipation, diarrhea  Neurological: Denies changes in sight, smell, hearing, taste, seizures. Denies loss of consciousness. Psychiatric: Denies depression, sleep pattern changes, anxiety, change in personality  Endocrine: Denies mood swings, heat or cold intolerance  Hematologic/Lymphatic: Denies anemia, purpura, petechia  Allergic/Immunologic: Denies swelling of throat, pain or swelling at lymph nodes      Physical Examination:    Visit Vitals  /82 (BP 1 Location: Right arm, BP Patient Position: Sitting, BP Cuff Size: Large adult)   Pulse 68   Temp 98.5 °F (36.9 °C) (Tympanic)   Ht 5' 5\" (1.651 m)   Wt 168 lb (76.2 kg)   SpO2 100%   BMI 27.96 kg/m²        General: AOX3, no apparent distress  Psychiatric: mood and affect appropriate  Lungs: breathing is symmetric and unlabored bilaterally  Heart: regular rate and rhythm  Abdomen: no guarding  Head: normocephalic, atraumatic  Skin: No significant abnormalities, good turgor  Sensation intact to light touch: L1-S1 dermatomes  Muscular exam: 5/5 strength in all major muscle groups unless noted in specialty exam.    Extremities:      Left upper extremity: Full active and passive range of motion without pain, deformity, no open wound, strength 5/5 in all major muscle groups.     Right upper extremity: Full active and passive range of motion without pain, deformity, no open wound, strength 5/5 in all major muscle groups. Right lower extremity: Well healed surgical wound is noted. Patient ambulates with no device and no limp. No deformity is noted. Circumduction of the hip does not cause arthritic pain as it had preoperatively. Strength testing is indicative of 5/5 strength at hip flexion, extension,  5/5 knee flexion and extension, tibialis anterior, EHL, and FHL. Sensation is intact to light touch in the L1-S1 dermatomes with lateral femoral cutaneous nerve function intact. Capillary refill is less than 2 seconds distally. Left lower extremity:  Well healed surgical wound is noted. Patient ambulates with no device and no limp. No deformity is noted. Circumduction of the hip does not cause arthritic pain as it had preoperatively. Strength testing is indicative of 5/5 strength at hip flexion, extension,  5/5 knee flexion and extension, tibialis anterior, EHL, and FHL. Sensation is intact to light touch in the L1-S1 dermatomes with lateral femoral cutaneous nerve function intact. Capillary refill is less than 2 seconds distally. Diagnostics:    Pertinent Xrays:  Xrays are available of the left hip. They indicate a hip arthroplasty in excellent position without evidence of loosening or failure. Leg lengths are equal.        Assessment: Aftercare, status post bilateral hip arthroplasty    Plan:   will continue physical therapy exercises. We discussed the importance of continued vigilance to this end. We also discussed dental prophylaxis and safe activities and reasonable life choices regarding activity level. Patient stated their understanding. Pain control for now will be as needed only, and will be patient directed.  will follow up in 3 months. No x-rays on follow up. Ms. Theo Almonte has a reminder for a \"due or due soon\" health maintenance.  I have asked that she contact her primary care provider for follow-up on this health maintenance.

## 2021-06-23 NOTE — LETTER
6/23/2021    Patient: Clare Bobo   YOB: 1950   Date of Visit: 6/23/2021     Sybil Travis NP  [address]  [place] [address] [address]  [place] [place] [employer]    Dear Sybil Travis NP,      Thank you for referring Ms. Alejandra Hardy to [facility] for evaluation. My notes for this consultation are attached. If you have questions, please do not hesitate to call me. I look forward to following your patient along with you.       Sincerely,    Carito Roman, DO

## 2021-06-23 NOTE — PROGRESS NOTES
Identified pt with two pt identifiers (name and ). Reviewed chart in preparation for visit and have obtained necessary documentation. Steven De Souza is a 66 y.o. female  Chief Complaint   Patient presents with    Surgical Follow-up     Bilateral hip     Visit Vitals  /82 (BP 1 Location: Right arm, BP Patient Position: Sitting, BP Cuff Size: Large adult)   Pulse 68   Temp 98.5 °F (36.9 °C) (Tympanic)   Ht 5' 5\" (1.651 m)   Wt 168 lb (76.2 kg)   SpO2 100%   BMI 27.96 kg/m²     1. Have you been to the ER, urgent care clinic since your last visit? Hospitalized since your last visit? No    2. Have you seen or consulted any other health care providers outside of the [address] [place] [address] since your last visit? Include any pap smears or colon screening.  No

## 2021-07-12 RX ORDER — ATENOLOL 100 MG/1
TABLET ORAL
Qty: 90 TABLET | Refills: 1 | Status: SHIPPED | OUTPATIENT
Start: 2021-07-12 | End: 2022-02-07

## 2021-09-22 ENCOUNTER — OFFICE VISIT (OUTPATIENT)
Dept: ORTHOPEDIC SURGERY | Age: 71
End: 2021-09-22
Payer: COMMERCIAL

## 2021-09-22 VITALS
BODY MASS INDEX: 29.29 KG/M2 | TEMPERATURE: 97 F | HEIGHT: 65 IN | HEART RATE: 67 BPM | SYSTOLIC BLOOD PRESSURE: 183 MMHG | RESPIRATION RATE: 14 BRPM | WEIGHT: 175.8 LBS | DIASTOLIC BLOOD PRESSURE: 84 MMHG

## 2021-09-22 DIAGNOSIS — Z47.1 AFTERCARE FOLLOWING LEFT HIP JOINT REPLACEMENT SURGERY: ICD-10-CM

## 2021-09-22 DIAGNOSIS — Z47.1 AFTERCARE FOLLOWING RIGHT HIP JOINT REPLACEMENT SURGERY: Primary | ICD-10-CM

## 2021-09-22 DIAGNOSIS — Z96.641 AFTERCARE FOLLOWING RIGHT HIP JOINT REPLACEMENT SURGERY: Primary | ICD-10-CM

## 2021-09-22 DIAGNOSIS — Z96.642 AFTERCARE FOLLOWING LEFT HIP JOINT REPLACEMENT SURGERY: ICD-10-CM

## 2021-09-22 PROCEDURE — 99212 OFFICE O/P EST SF 10 MIN: CPT | Performed by: ORTHOPAEDIC SURGERY

## 2021-09-22 RX ORDER — AMOXICILLIN AND CLAVULANATE POTASSIUM 500; 125 MG/1; MG/1
TABLET, FILM COATED ORAL
COMMUNITY
Start: 2021-04-14 | End: 2021-10-21 | Stop reason: ALTCHOICE

## 2021-09-22 NOTE — PROGRESS NOTES
Dominick Richardson is a 66 y.o. female    Chief Complaint   Patient presents with    Hip Replacement     follow up     1. Have you been to the ER, urgent care clinic since your last visit? Hospitalized since your last visit? No    2. Have you seen or consulted any other health care providers outside of the [address] [place] [address] since your last visit? Include any pap smears or colon screening.  No     Visit Vitals  BP (!) 183/84 (BP 1 Location: Right arm, BP Patient Position: Sitting, BP Cuff Size: Adult)   Pulse 67   Temp 97 °F (36.1 °C) (Temporal)   Resp 14   Ht 5' 5\" (1.651 m)   Wt 175 lb 12.8 oz (79.7 kg)   BMI 29.25 kg/m²

## 2021-09-22 NOTE — LETTER
9/22/2021    Patient: Asuncion Blake   YOB: 1950   Date of Visit: 9/22/2021     Meme Timmons NP  [address]  [place] [address] [address]  [place] [facility] [address]    Dear Meme Timmons NP,      Thank you for referring Ms. Regina Redman to [facility] for evaluation. My notes for this consultation are attached. If you have questions, please do not hesitate to call me. I look forward to following your patient along with you.       Sincerely,    Maci Novak, DO

## 2021-09-23 NOTE — PROGRESS NOTES
9/22/2021    Chief Complaint: Follow up for bilateral hip replacement    HPI: Orly Braswell is a 66 y.o.  who is now 6 months status post bilateral hip  arthroplasty. The patient states that they are doing well. The preoperative pain is gone and the postoperative pain is minimal.   Regular exercises have helped with residual symptoms. Past Medical History:   Diagnosis Date    Acquired hypothyroidism     Arthritis     Essential hypertension     History of mammogram 01/06/2021    History of Papanicolaou smear of cervix 01/2021    Pure hypercholesterolemia     patient denies hx    Vitamin D deficiency        Past Surgical History:   Procedure Laterality Date    COLONOSCOPY N/A 2/24/2021    COLONOSCOPY and EGD performed by Daniel Ferrara MD at [facility] HX BREAST BIOPSY Left     Papiloma 1991    HX COLONOSCOPY  12/14/2017    HX COLONOSCOPY  11/01/2012    HX HIP REPLACEMENT Bilateral 03/22/2021    HX HYSTERECTOMY  06/01/1997    HX ORTHOPAEDIC  02/27/2020    laminectomy and bilateral foraminotomy    HX ORTHOPAEDIC  03/22/2021    bilateral hip replacement    HX UROLOGICAL      Uretheral dilatation x 3       Current Outpatient Medications on File Prior to Visit   Medication Sig Dispense Refill    amoxicillin-clavulanate (AUGMENTIN) 500-125 mg per tablet TAKE 4 TABLETS BY MOUTH 1 HOUR PRIOR TO DENTAL PROCEDURE      atenoloL (TENORMIN) 100 mg tablet TAKE 1 TABLET BY MOUTH EVERY DAY 90 Tablet 1    hydroCHLOROthiazide (HYDRODIURIL) 25 mg tablet TAKE 1/2 TABLET BY MOUTH EVERY DAY 45 Tablet 1    therapeutic multivitamin-minerals (THERAGRAN-M) tablet Take 1 Tab by mouth daily.  CALCIUM-MAGNESIUM-VITAMIN D2 PO Take 2 Tabs by mouth two (2) times a day.  fish oil-omega-3 fatty acids 300-500 mg cap Take 2 Tabs by mouth daily.  glucosamine-chondroitin (ARTHX) 500-400 mg cap Take 3-5 Caps by mouth daily.       synthroid 50 mcg tablet TAKE 1 TABLET BY MOUTH EVERY DAY (Patient taking differently: Take 50 mcg by mouth Daily (before breakfast). ) 90 Tab 2    cholecalciferol (VITAMIN D3) (2,000 UNITS /50 MCG) cap capsule Take 1 Cap by mouth as needed.  estradioL (VIVELLE) 0.0375 mg/24 hr 1 Patch by TransDERmal route two (2) days a week. Sundays and Thursdays      sennosides (Senna) 8.6 mg cap Take  by mouth. (Patient not taking: Reported on 9/22/2021)      lidocaine/me-sal/menthol/camph (CBD-KINGS WITH LIDOCAINE EX) by Apply Externally route as needed. (Patient not taking: Reported on 9/22/2021)       No current facility-administered medications on file prior to visit. Allergies   Allergen Reactions    Nitrofurantoin Nausea and Vomiting       Family History   Problem Relation Age of Onset    Breast Cancer Mother 80    Hypertension Mother     Colon Cancer Father     Arthritis-rheumatoid Father     Hypertension Father     Cancer Brother         stomach       Social History     Socioeconomic History    Marital status:      Spouse name: Not on file    Number of children: Not on file    Years of education: Not on file    Highest education level: Not on file   Tobacco Use    Smoking status: Former Smoker     Packs/day: 1.00     Years: 35.00     Pack years: 35.00    Smokeless tobacco: Never Used    Tobacco comment: Quit 4/1999   Vaping Use    Vaping Use: Never used   Substance and Sexual Activity    Alcohol use: Yes     Comment: ocassion    Drug use: Never     Social Determinants of Health     Financial Resource Strain:     Difficulty of Paying Living Expenses:    Food Insecurity:     Worried About Running Out of Food in the Last Year:     Ran Out of Food in the Last Year:    Transportation Needs:     Lack of Transportation (Medical):      Lack of Transportation (Non-Medical):    Physical Activity:     Days of Exercise per Week:     Minutes of Exercise per Session:    Stress:     Feeling of Stress :    Social Connections:     Frequency of Communication with Friends and Family:     Frequency of Social Gatherings with Friends and Family:     Attends Taoist Services:     Active Member of Clubs or Organizations:     Attends Club or Organization Meetings:     Marital Status:          Review of Systems:       General: Denies headache, lethargy, fever, weight loss  Ears/Nose/Throat: Denies ear discharge, drainage, nosebleeds, hoarse voice, dental problems  Cardiovascular: Denies chest pain, shortness of breath  Lungs: Denies chest pain, breathing problems, wheezing, pneumonia  Stomach: Denies stomach pain, heartburn, constipation, irritable bowel  Skin: Denies rash, sores, open wounds  Musculoskeletal: Bilateral hip replacement  Genitourinary: Denies dysuria, hematuria, polyuria  Gastrointestinal: Denies constipation, obstipation, diarrhea  Neurological: Denies changes in sight, smell, hearing, taste, seizures. Denies loss of consciousness.   Psychiatric: Denies depression, sleep pattern changes, anxiety, change in personality  Endocrine: Denies mood swings, heat or cold intolerance  Hematologic/Lymphatic: Denies anemia, purpura, petechia  Allergic/Immunologic: Denies swelling of throat, pain or swelling at lymph nodes      Physical Examination:    Visit Vitals  BP (!) 183/84 (BP 1 Location: Right arm, BP Patient Position: Sitting, BP Cuff Size: Adult)   Pulse 67   Temp 97 °F (36.1 °C) (Temporal)   Resp 14   Ht 5' 5\" (1.651 m)   Wt 175 lb 12.8 oz (79.7 kg)   BMI 29.25 kg/m²        General: AOX3, no apparent distress  Psychiatric: mood and affect appropriate  Lungs: breathing is symmetric and unlabored bilaterally  Heart: regular rate and rhythm  Abdomen: no guarding  Head: normocephalic, atraumatic  Skin: No significant abnormalities, good turgor  Sensation intact to light touch: L1-S1 dermatomes  Muscular exam: 5/5 strength in all major muscle groups unless noted in specialty exam.    Extremities:      Left upper extremity: Full active and passive range of motion without pain, deformity, no open wound, strength 5/5 in all major muscle groups. Right upper extremity: Full active and passive range of motion without pain, deformity, no open wound, strength 5/5 in all major muscle groups. Right lower extremity: Full active and passive range of motion without pain, deformity, no open wound, strength 5/5 in all major muscle groups. Left lower extremity:  Well healed surgical wound is noted. Patient ambulates with no device and no limp. No deformity is noted. Circumduction of the hip does not cause arthritic pain as it had preoperatively. Strength testing is indicative of 5/5 strength at hip flexion, extension,  5/5 knee flexion and extension, tibialis anterior, EHL, and FHL. Sensation is intact to light touch in the L1-S1 dermatomes with lateral femoral cutaneous nerve function intact. Capillary refill is less than 2 seconds distally. Diagnostics:    Pertinent Xrays:  Xrays are available of the bilateral hip. They indicate a hip arthroplasty in excellent position without evidence of loosening or failure. Leg lengths are equal.         Assessment: Aftercare, status post bilateral hip arthroplasty    Plan:   will continue physical therapy exercises. We discussed the importance of continued vigilance to this end. We also discussed dental prophylaxis and safe activities and reasonable life choices regarding activity level. Patient stated their understanding. Pain control for now will be as needed only, and will be patient directed.  will follow up in 6 months. Bilateral hip x-rays on follow up. Ms. Karma Lopez has a reminder for a \"due or due soon\" health maintenance. I have asked that she contact her primary care provider for follow-up on this health maintenance.

## 2021-10-07 ENCOUNTER — TELEPHONE (OUTPATIENT)
Dept: ORTHOPEDIC SURGERY | Age: 71
End: 2021-10-07

## 2021-10-07 NOTE — TELEPHONE ENCOUNTER
I spoke with pt and informed her if she couldn't get in touch with her GYN she needed to call her PCP and have her send in a vaginal cream to help. She agreed and stated she never even thought about her PCP and said she would give her a call tomorrow.

## 2021-10-07 NOTE — TELEPHONE ENCOUNTER
Pt calling to speak with you. Trying to get her OB to refill her prefilled insert ointment for her vaginal irritation but they wont respond. Says the antibiotic is reeking havoc on her body and causes this irritation. Wants to know if Dr Katherin Salcedo could take over refilling this for her since he fills the antibiotic that causes the irritation. Please follow up with pt.

## 2021-10-11 ENCOUNTER — TELEPHONE (OUTPATIENT)
Dept: FAMILY MEDICINE CLINIC | Age: 71
End: 2021-10-11

## 2021-10-11 NOTE — TELEPHONE ENCOUNTER
Patient stated that Shari Tenorio from her surgeon Dr. Pandey Ranks office suggest that she request a prescription for 6.5% Tioconazole-1 ointment because she takes an antibiotic and if verification is needed to please call Shari Tenorio at [phone]. She requested it to be sent to the pharmacy at [address] [place] [address] and to please call back and leave a message at this number. This is her private cell phone and a password is needed to check the message.

## 2021-10-20 ENCOUNTER — TELEPHONE (OUTPATIENT)
Dept: FAMILY MEDICINE CLINIC | Age: 71
End: 2021-10-20

## 2021-10-20 NOTE — TELEPHONE ENCOUNTER
----- Message from Leah Rajput sent at 10/20/2021 10:36 AM EDT -----  Subject: Message to Provider    QUESTIONS  Information for Provider? Patient called to confirm appt as well as advise   she may be considering the flu shot at her appt 10/21/2021, please be   advised. Also patient advised Dr. Kwasi Mario advised pt to be on antibiotic   before any procedure ( dental or any other ) and her GYN usually   prescribes her a vaginal insert for her recurring irritation due to her   GYN is not consistent and pt would like Dr. Debbie Conde to write her   ongoing rx rx is ointment [facility] tioconazole-1 6.5 %   ---------------------------------------------------------------------------  --------------  CALL BACK INFO  What is the best way for the office to contact you? OK to leave message on   voicemail  Preferred Call Back Phone Number?  [phone]  ---------------------------------------------------------------------------  --------------  SCRIPT ANSWERS  undefined

## 2021-10-21 ENCOUNTER — OFFICE VISIT (OUTPATIENT)
Dept: FAMILY MEDICINE CLINIC | Age: 71
End: 2021-10-21
Payer: COMMERCIAL

## 2021-10-21 VITALS
BODY MASS INDEX: 29.59 KG/M2 | OXYGEN SATURATION: 99 % | HEIGHT: 65 IN | TEMPERATURE: 97.2 F | DIASTOLIC BLOOD PRESSURE: 80 MMHG | WEIGHT: 177.6 LBS | SYSTOLIC BLOOD PRESSURE: 130 MMHG | HEART RATE: 64 BPM

## 2021-10-21 DIAGNOSIS — Z13.220 SCREENING FOR HYPERLIPIDEMIA: ICD-10-CM

## 2021-10-21 DIAGNOSIS — N94.9 VAGINAL DISCOMFORT: ICD-10-CM

## 2021-10-21 DIAGNOSIS — E03.9 ACQUIRED HYPOTHYROIDISM: ICD-10-CM

## 2021-10-21 DIAGNOSIS — E66.3 OVERWEIGHT WITH BODY MASS INDEX (BMI) OF 29 TO 29.9 IN ADULT: ICD-10-CM

## 2021-10-21 DIAGNOSIS — Z00.00 WELLNESS EXAMINATION: Primary | ICD-10-CM

## 2021-10-21 DIAGNOSIS — E78.00 PURE HYPERCHOLESTEROLEMIA: ICD-10-CM

## 2021-10-21 DIAGNOSIS — Z23 ENCOUNTER FOR IMMUNIZATION: ICD-10-CM

## 2021-10-21 DIAGNOSIS — Z28.20 VACCINE REFUSED BY PATIENT: ICD-10-CM

## 2021-10-21 DIAGNOSIS — Z53.20 OSTEOPOROSIS SCREENING DECLINED: ICD-10-CM

## 2021-10-21 DIAGNOSIS — I10 ESSENTIAL HYPERTENSION: ICD-10-CM

## 2021-10-21 PROCEDURE — 99214 OFFICE O/P EST MOD 30 MIN: CPT | Performed by: NURSE PRACTITIONER

## 2021-10-21 PROCEDURE — 90471 IMMUNIZATION ADMIN: CPT | Performed by: NURSE PRACTITIONER

## 2021-10-21 PROCEDURE — 90674 CCIIV4 VAC NO PRSV 0.5 ML IM: CPT | Performed by: NURSE PRACTITIONER

## 2021-10-21 PROCEDURE — 99397 PER PM REEVAL EST PAT 65+ YR: CPT | Performed by: NURSE PRACTITIONER

## 2021-10-21 RX ORDER — BISMUTH SUBSALICYLATE 262 MG
1 TABLET,CHEWABLE ORAL DAILY
COMMUNITY

## 2021-10-21 RX ORDER — TIOCONAZOLE 6.5 %
OINTMENT WITH PREFILLED APPLICATOR VAGINAL
COMMUNITY
Start: 2021-10-01 | End: 2022-10-26

## 2021-10-21 NOTE — PATIENT INSTRUCTIONS

## 2021-10-21 NOTE — PROGRESS NOTES
Chief Complaint   Patient presents with   [facility] Annual Wellness Visit   1. Have you been to the ER, urgent care clinic since your last visit? Hospitalized since your last visit? No    2. Have you seen or consulted any other health care providers outside of the [address] [place] [address] since your last visit? Include any pap smears or colon screening. Yes, patient has seen eye doctor ,podiatrist and orthopaedist and has just finished doing physical therapy       3 most recent PHQ Screens 10/21/2021   Little interest or pleasure in doing things Not at all   Feeling down, depressed, irritable, or hopeless Not at all   Total Score PHQ 2 0       Fall Risk Assessment, last 12 mths 10/21/2021   Able to walk? Yes   Fall in past 12 months? 0   Do you feel unsteady?  0   Are you worried about falling 0

## 2021-10-21 NOTE — PROGRESS NOTES
Subjective  Chief Complaint   Patient presents with    Annual Wellness Visit     HPI:  Bhupendra Campbell is a 66 y.o. female. Patient presents for wellness, fasting labs, and management of HTN, hypercholesterolemia, and hypothyroidism. She also has a concern to discuss today. Understands this is considered two appointments and there may be a copay for the follow up/concerns portion of the visit. For chronic disease management and concerns:  Medications reviewed, taking as prescribed with no known side effects. Requesting Tioconazole RX with multiple refills. She is not happy her gynecologist will only provide one refill with each RX. She is using this medication prn for vaginal discomfort and after antibiotic use.      Management of HTN-  Current meds: Atenolol 100mg and HCTZ 25mg daily  Home BP readings: does not check home BP readings, has a cuff  High salt intake: no  Stays hydrated: yes  Regular exercise: as below    For wellness:  Immunizations:  Flu: due now  COVID: considering  Tetanus: declines  Shingrix: declines  Pneumovax 23: declines  Prevnar 13: declines    HCV screening: complete  LMP: hysterectomy  Pap: no longer indicated  Mammo: 2021  Dexa: declines  Colon cancer screenin  Smoking status: former  Low dose CT scan: not indicated, quit over 15 years ago    Moods: at goal  PHQ2: 0/2  Diet: healthy diet  Exercise: recently released from PT following back surgery, working to slowly increase exercise as tolerated  Vision exams: annual  Dental exams: overdue, declines due to Patricia      Past Medical History:   Diagnosis Date    Acquired hypothyroidism     Arthritis     Essential hypertension     History of mammogram 2021    History of Papanicolaou smear of cervix 2021    Pure hypercholesterolemia     patient denies hx    Vitamin D deficiency      Family History   Problem Relation Age of Onset    Breast Cancer Mother 80    Hypertension Mother     Colon Cancer Father     Arthritis-rheumatoid Father     Hypertension Father     Cancer Brother         stomach     Social History     Socioeconomic History    Marital status:      Spouse name: Not on file    Number of children: Not on file    Years of education: Not on file    Highest education level: Not on file   Occupational History    Not on file   Tobacco Use    Smoking status: Former Smoker     Packs/day: 1.00     Years: 35.00     Pack years: 35.00    Smokeless tobacco: Never Used    Tobacco comment: Quit 4/1999   Vaping Use    Vaping Use: Never used   Substance and Sexual Activity    Alcohol use: Yes     Comment: ocassion    Drug use: Never    Sexual activity: Not on file   Other Topics Concern    Not on file   Social History Narrative    Not on file     Social Determinants of Health     Financial Resource Strain:     Difficulty of Paying Living Expenses:    Food Insecurity:     Worried About Running Out of Food in the Last Year:     [address] in the Last Year:    Transportation Needs:     Lack of Transportation (Medical):  Lack of Transportation (Non-Medical):    Physical Activity:     Days of Exercise per Week:     Minutes of Exercise per Session:    Stress:     Feeling of Stress :    Social Connections:     Frequency of Communication with Friends and Family:     Frequency of Social Gatherings with Friends and Family:     Attends Anab[employer]ist Services:     Active Member of Clubs or Organizations:     Attends Club or Organization Meetings:     Marital Status:    Intimate Partner Violence:     Fear of Current or Ex-Partner:     Emotionally Abused:     Physically Abused:     Sexually Abused:      Current Outpatient Medications on File Prior to Visit   Medication Sig Dispense Refill    Tioconazole-1 6.5 % oint       OTHER,NON-FORMULARY, cbd cream      multivitamin (ONE A DAY) tablet Take 1 Tablet by mouth daily.       atenoloL (TENORMIN) 100 mg tablet TAKE 1 TABLET BY MOUTH EVERY DAY 90 Tablet 1    hydroCHLOROthiazide (HYDRODIURIL) 25 mg tablet TAKE 1/2 TABLET BY MOUTH EVERY DAY 45 Tablet 1    CALCIUM-MAGNESIUM-VITAMIN D2 PO Take 2 Tabs by mouth two (2) times a day.  fish oil-omega-3 fatty acids 300-500 mg cap Take 2 Tabs by mouth daily.  glucosamine-chondroitin (ARTHX) 500-400 mg cap Take 3-5 Caps by mouth daily.  synthroid 50 mcg tablet TAKE 1 TABLET BY MOUTH EVERY DAY (Patient taking differently: Take 50 mcg by mouth Daily (before breakfast). ) 90 Tab 2    estradioL (VIVELLE) 0.0375 mg/24 hr 1 Patch by TransDERmal route two (2) days a week. Sundays and Thursdays      [DISCONTINUED] amoxicillin-clavulanate (AUGMENTIN) 500-125 mg per tablet TAKE 4 TABLETS BY MOUTH 1 HOUR PRIOR TO DENTAL PROCEDURE      [DISCONTINUED] sennosides (Senna) 8.6 mg cap Take  by mouth. (Patient not taking: Reported on 9/22/2021)      [DISCONTINUED] therapeutic multivitamin-minerals (THERAGRAN-M) tablet Take 1 Tab by mouth daily.  [DISCONTINUED] lidocaine/me-sal/menthol/camph (CBD-KINGS WITH LIDOCAINE EX) by Apply Externally route as needed. (Patient not taking: Reported on 9/22/2021)      [DISCONTINUED] cholecalciferol (VITAMIN D3) (2,000 UNITS /50 MCG) cap capsule Take 1 Cap by mouth as needed. (Patient not taking: Reported on 10/21/2021)       No current facility-administered medications on file prior to visit. Allergies   Allergen Reactions    Nitrofurantoin Nausea and Vomiting     Review of Systems   Constitutional: Negative for chills, fever and weight loss. HENT: Negative for congestion, ear pain, hearing loss, sinus pain and sore throat. Denies difficulty swallowing. Eyes: Negative for blurred vision. Respiratory: Negative for cough, shortness of breath and wheezing. Cardiovascular: Negative for chest pain, palpitations, claudication and leg swelling. Gastrointestinal: Negative for abdominal pain, constipation, diarrhea and heartburn.    Genitourinary: Negative for dysuria. Musculoskeletal: Negative for joint pain and myalgias. Neurological: Negative for dizziness, tingling, weakness and headaches. Psychiatric/Behavioral: Negative for depression. The patient is not nervous/anxious. Objective  Visit Vitals  /80 (BP 1 Location: Left upper arm, BP Patient Position: At rest, BP Cuff Size: Adult)   Pulse 64   Temp 97.2 °F (36.2 °C) (Temporal)   Ht 5' 5\" (1.651 m)   Wt 177 lb 9.6 oz (80.6 kg)   SpO2 99%   BMI 29.55 kg/m²       Physical Exam  Vitals and nursing note reviewed. Constitutional:       General: She is not in acute distress. Appearance: Normal appearance. She is overweight. HENT:      Head: Normocephalic. Mouth/Throat:      Pharynx: No posterior oropharyngeal erythema. Eyes:      Extraocular Movements: Extraocular movements intact. Neck:      Thyroid: No thyroid mass, thyromegaly or thyroid tenderness. Cardiovascular:      Rate and Rhythm: Normal rate and regular rhythm. Heart sounds: Normal heart sounds. Pulmonary:      Effort: Pulmonary effort is normal.      Breath sounds: Normal breath sounds. Abdominal:      General: Bowel sounds are normal.      Palpations: Abdomen is soft. Tenderness: There is no abdominal tenderness. Musculoskeletal:         General: Normal range of motion. Cervical back: Normal range of motion and neck supple. Right lower leg: No edema. Left lower leg: No edema. Lymphadenopathy:      Cervical: No cervical adenopathy. Upper Body:      Right upper body: No supraclavicular adenopathy. Left upper body: No supraclavicular adenopathy. Skin:     General: Skin is warm and dry. Neurological:      General: No focal deficit present. Mental Status: She is alert and oriented to person, place, and time. Psychiatric:         Mood and Affect: Mood normal.         Behavior: Behavior normal.         Thought Content:  Thought content normal.         Judgment: Judgment normal.          Assessment & Plan      ICD-10-CM ICD-9-CM    1. Wellness examination  Z00.00 V70.0    2. Screening for hyperlipidemia  Z13.220 V77.91 CBC WITH AUTOMATED DIFF      LIPID PANEL      METABOLIC PANEL, COMPREHENSIVE   3. Overweight with body mass index (BMI) of 29 to 29.9 in adult  E66.3 278.02     Z68.29 V85.25    4. Encounter for immunization  Z23 V03.89 INFLUENZA, INJECTABLE, MDCK, PRESERVATIVE FREE, QUADRIVALENT   5. Vaccine refused by patient  Z28.20 V64.09    6. Osteoporosis screening declined  Z53.20 V64.2    7. Essential hypertension  U38 148.1 METABOLIC PANEL, COMPREHENSIVE   8. Pure hypercholesterolemia  E78.00 272.0 CBC WITH AUTOMATED DIFF      LIPID PANEL   9. Acquired hypothyroidism  E03.9 244.9 TSH 3RD GENERATION   10. Vaginal discomfort  N94.9 625.9      Diagnoses and all orders for this visit:    1. Wellness examination  We are getting patient up-to-date on preventative measures as listed. 2. Screening for hyperlipidemia  -     CBC WITH AUTOMATED DIFF  -     LIPID PANEL  -     METABOLIC PANEL, COMPREHENSIVE    3. Overweight with body mass index (BMI) of 29 to 29.9 in adult  Continue to stay active and eat a healthy diet. 4. Encounter for immunization  Considering COVID vaccine. Advised to receive from pharmacy when ready.  -     INFLUENZA, INJECTABLE, MDCK, PRESERVATIVE FREE, QUADRIVALENT    5. Vaccine refused by patient  Declines tetanus, Shingrix, and pneumonia vaccines. 6. Osteoporosis screening declined    7. Essential hypertension  BP is below goal in the office today. Reinforced the importance of home BP monitoring. Check BP readings 1 to 2 hours after taking medication and after sitting quietly for 5 minutes with both feet flat on the floor and arm at heart level. Call if readings are consistently greater than 150/90 on either number. Increase regular exercise, limit salt intake, and stay well-hydrated. -     METABOLIC PANEL, COMPREHENSIVE    8.  Pure hypercholesterolemia  Checking annual labs. Continue to stay active and limit fat/cholesterol intake. -     CBC WITH AUTOMATED DIFF  -     LIPID PANEL    9. Acquired hypothyroidism  Checking annual lab. Continue to take Levothyroxine daily in am alone and with water.  -     TSH 3RD GENERATION    10. Vaginal discomfort  Explained I would not prescribe any more medication than her gynecologist. I also explained this would be better managed by gynecology. If she wants me to take over management, I would need gynecology notes and testing for review. I also cautioned her she may need to be seen for flares. Follow-up and Dispositions    · Return in about 6 months (around 4/21/2022) for follow up, hypertension, nonfasting.            Verena Lee, RADHA

## 2021-10-22 LAB
ALBUMIN SERPL-MCNC: 4.7 G/DL (ref 3.7–4.7)
ALBUMIN/GLOB SERPL: 2 {RATIO} (ref 1.2–2.2)
ALP SERPL-CCNC: 77 IU/L (ref 44–121)
ALT SERPL-CCNC: 14 IU/L (ref 0–32)
AST SERPL-CCNC: 20 IU/L (ref 0–40)
BASOPHILS # BLD AUTO: 0 X10E3/UL (ref 0–0.2)
BASOPHILS NFR BLD AUTO: 0 %
BILIRUB SERPL-MCNC: 0.4 MG/DL (ref 0–1.2)
BUN SERPL-MCNC: 15 MG/DL (ref 8–27)
BUN/CREAT SERPL: 19 (ref 12–28)
CALCIUM SERPL-MCNC: 9.6 MG/DL (ref 8.7–10.3)
CHLORIDE SERPL-SCNC: 100 MMOL/L (ref 96–106)
CHOLEST SERPL-MCNC: 194 MG/DL (ref 100–199)
CO2 SERPL-SCNC: 24 MMOL/L (ref 20–29)
CREAT SERPL-MCNC: 0.78 MG/DL (ref 0.57–1)
EOSINOPHIL # BLD AUTO: 0.1 X10E3/UL (ref 0–0.4)
EOSINOPHIL NFR BLD AUTO: 2 %
ERYTHROCYTE [DISTWIDTH] IN BLOOD BY AUTOMATED COUNT: 12.8 % (ref 11.7–15.4)
GLOBULIN SER CALC-MCNC: 2.4 G/DL (ref 1.5–4.5)
GLUCOSE SERPL-MCNC: 100 MG/DL (ref 65–99)
HCT VFR BLD AUTO: 42.5 % (ref 34–46.6)
HDLC SERPL-MCNC: 79 MG/DL
HGB BLD-MCNC: 14.6 G/DL (ref 11.1–15.9)
IMM GRANULOCYTES # BLD AUTO: 0 X10E3/UL (ref 0–0.1)
IMM GRANULOCYTES NFR BLD AUTO: 0 %
LDLC SERPL CALC-MCNC: 97 MG/DL (ref 0–99)
LYMPHOCYTES # BLD AUTO: 1.6 X10E3/UL (ref 0.7–3.1)
LYMPHOCYTES NFR BLD AUTO: 21 %
MCH RBC QN AUTO: 28.9 PG (ref 26.6–33)
MCHC RBC AUTO-ENTMCNC: 34.4 G/DL (ref 31.5–35.7)
MCV RBC AUTO: 84 FL (ref 79–97)
MONOCYTES # BLD AUTO: 0.4 X10E3/UL (ref 0.1–0.9)
MONOCYTES NFR BLD AUTO: 5 %
NEUTROPHILS # BLD AUTO: 5.5 X10E3/UL (ref 1.4–7)
NEUTROPHILS NFR BLD AUTO: 72 %
PLATELET # BLD AUTO: 225 X10E3/UL (ref 150–450)
POTASSIUM SERPL-SCNC: 4.2 MMOL/L (ref 3.5–5.2)
PROT SERPL-MCNC: 7.1 G/DL (ref 6–8.5)
RBC # BLD AUTO: 5.05 X10E6/UL (ref 3.77–5.28)
SODIUM SERPL-SCNC: 140 MMOL/L (ref 134–144)
TRIGL SERPL-MCNC: 102 MG/DL (ref 0–149)
TSH SERPL DL<=0.005 MIU/L-ACNC: 3.54 UIU/ML (ref 0.45–4.5)
VLDLC SERPL CALC-MCNC: 18 MG/DL (ref 5–40)
WBC # BLD AUTO: 7.7 X10E3/UL (ref 3.4–10.8)

## 2021-10-22 NOTE — PROGRESS NOTES
No blood cell abnormalities including anemia. Lipids are all below goal.   Normal glucose, thyroid, kidney, and liver function.

## 2021-11-08 DIAGNOSIS — Z96.641 AFTERCARE FOLLOWING RIGHT HIP JOINT REPLACEMENT SURGERY: Primary | ICD-10-CM

## 2021-11-08 DIAGNOSIS — Z96.642 AFTERCARE FOLLOWING LEFT HIP JOINT REPLACEMENT SURGERY: ICD-10-CM

## 2021-11-08 DIAGNOSIS — Z47.1 AFTERCARE FOLLOWING RIGHT HIP JOINT REPLACEMENT SURGERY: Primary | ICD-10-CM

## 2021-11-08 DIAGNOSIS — Z47.1 AFTERCARE FOLLOWING LEFT HIP JOINT REPLACEMENT SURGERY: ICD-10-CM

## 2021-11-08 RX ORDER — AMOXICILLIN AND CLAVULANATE POTASSIUM 500; 125 MG/1; MG/1
4 TABLET, FILM COATED ORAL ONCE
Qty: 4 TABLET | Refills: 0 | Status: SHIPPED | OUTPATIENT
Start: 2021-11-08 | End: 2021-11-08

## 2022-02-07 RX ORDER — ATENOLOL 100 MG/1
TABLET ORAL
Qty: 90 TABLET | Refills: 1 | Status: SHIPPED | OUTPATIENT
Start: 2022-02-07 | End: 2022-08-04

## 2022-02-11 DIAGNOSIS — Z47.1 AFTERCARE FOLLOWING LEFT HIP JOINT REPLACEMENT SURGERY: ICD-10-CM

## 2022-02-11 DIAGNOSIS — Z96.641 AFTERCARE FOLLOWING RIGHT HIP JOINT REPLACEMENT SURGERY: Primary | ICD-10-CM

## 2022-02-11 DIAGNOSIS — Z96.642 AFTERCARE FOLLOWING LEFT HIP JOINT REPLACEMENT SURGERY: ICD-10-CM

## 2022-02-11 DIAGNOSIS — Z47.1 AFTERCARE FOLLOWING RIGHT HIP JOINT REPLACEMENT SURGERY: Primary | ICD-10-CM

## 2022-02-14 ENCOUNTER — DOCUMENTATION ONLY (OUTPATIENT)
Dept: ORTHOPEDIC SURGERY | Age: 72
End: 2022-02-14

## 2022-02-15 ENCOUNTER — DOCUMENTATION ONLY (OUTPATIENT)
Dept: ORTHOPEDIC SURGERY | Age: 72
End: 2022-02-15

## 2022-02-16 ENCOUNTER — OFFICE VISIT (OUTPATIENT)
Dept: ORTHOPEDIC SURGERY | Age: 72
End: 2022-02-16
Payer: COMMERCIAL

## 2022-02-16 ENCOUNTER — HOSPITAL ENCOUNTER (OUTPATIENT)
Dept: GENERAL RADIOLOGY | Age: 72
Discharge: HOME | End: 2022-02-16
Payer: COMMERCIAL

## 2022-02-16 VITALS
BODY MASS INDEX: 30.82 KG/M2 | TEMPERATURE: 97.3 F | HEIGHT: 65 IN | DIASTOLIC BLOOD PRESSURE: 100 MMHG | SYSTOLIC BLOOD PRESSURE: 169 MMHG | HEART RATE: 64 BPM | OXYGEN SATURATION: 97 % | WEIGHT: 185 LBS

## 2022-02-16 DIAGNOSIS — Z47.1 AFTERCARE FOLLOWING RIGHT HIP JOINT REPLACEMENT SURGERY: ICD-10-CM

## 2022-02-16 DIAGNOSIS — Z96.642 AFTERCARE FOLLOWING LEFT HIP JOINT REPLACEMENT SURGERY: ICD-10-CM

## 2022-02-16 DIAGNOSIS — Z96.641 AFTERCARE FOLLOWING RIGHT HIP JOINT REPLACEMENT SURGERY: Primary | ICD-10-CM

## 2022-02-16 DIAGNOSIS — Z47.1 AFTERCARE FOLLOWING LEFT HIP JOINT REPLACEMENT SURGERY: ICD-10-CM

## 2022-02-16 DIAGNOSIS — Z47.1 AFTERCARE FOLLOWING RIGHT HIP JOINT REPLACEMENT SURGERY: Primary | ICD-10-CM

## 2022-02-16 DIAGNOSIS — Z96.641 AFTERCARE FOLLOWING RIGHT HIP JOINT REPLACEMENT SURGERY: ICD-10-CM

## 2022-02-16 PROCEDURE — 99213 OFFICE O/P EST LOW 20 MIN: CPT | Performed by: ORTHOPAEDIC SURGERY

## 2022-02-16 PROCEDURE — 73522 X-RAY EXAM HIPS BI 3-4 VIEWS: CPT

## 2022-02-16 NOTE — LETTER
2/16/2022    Patient: Shanika Melendrez   YOB: 1950   Date of Visit: 2/16/2022     Isaak Zuñiga NP  [address]  [place] [address] [address]  [place] [place] [facility] [address]    Dear Isaak Zuñiga NP,      Thank you for referring Ms. Vidhya Moscoso to [facility] for evaluation. My notes for this consultation are attached. If you have questions, please do not hesitate to call me. I look forward to following your patient along with you.       Sincerely,    Brii Ramon, DO

## 2022-02-16 NOTE — PROGRESS NOTES
1) Carlypsoight testing to look at psychiatric medication metabolism. Samson Levine our pharmacist will reach out to you with more info. If it's available to you, we'll wait on that info before making a medication change.     2) Decrease estradiol to 0.1mL weekly (2mg).    2/15 Mailed CD from Dr. Glory Douglas office/SO

## 2022-02-16 NOTE — PROGRESS NOTES
Identified pt with two pt identifiers (name and ). Reviewed chart in preparation for visit and have obtained necessary documentation. Oz Dexter is a 66 y.o. female  Chief Complaint   Patient presents with    Surgical Follow-up     Bilateral hip     Visit Vitals  BP (!) 169/100 (BP 1 Location: Left upper arm, BP Patient Position: Sitting, BP Cuff Size: Large adult)   Pulse 64   Temp 97.3 °F (36.3 °C) (Tympanic)   Ht 5' 5\" (1.651 m)   Wt 185 lb (83.9 kg)   SpO2 97%   BMI 30.79 kg/m²     1. Have you been to the ER, urgent care clinic since your last visit? Hospitalized since your last visit? No    2. Have you seen or consulted any other health care providers outside of the [address] [place] [address] since your last visit? Include any pap smears or colon screening.  No

## 2022-02-16 NOTE — PROGRESS NOTES
2/16/2022    Chief Complaint: Follow up for bilateral hip replacement    HPI: Linette Medina is a 66 y.o.  who is now one year status post bilateral hip  arthroplasty. The patient states that they are doing well. The preoperative pain is gone and the postoperative pain is minimal.   Regular exercises have helped with residual symptoms. Past Medical History:   Diagnosis Date    Acquired hypothyroidism     Arthritis     Essential hypertension     History of mammogram 01/06/2021    History of Papanicolaou smear of cervix 01/2021    Pure hypercholesterolemia     patient denies hx    Vitamin D deficiency        Past Surgical History:   Procedure Laterality Date    COLONOSCOPY N/A 2/24/2021    COLONOSCOPY and EGD performed by Per Leung MD at [address] [facility] HX BREAST BIOPSY Left     Papiloma 1991    HX COLONOSCOPY  12/14/2017    HX COLONOSCOPY  11/01/2012    HX HIP REPLACEMENT Bilateral 03/22/2021    HX HYSTERECTOMY  06/01/1997    HX ORTHOPAEDIC  02/27/2020    laminectomy and bilateral foraminotomy    HX ORTHOPAEDIC  03/22/2021    bilateral hip replacement    HX UROLOGICAL      Uretheral dilatation x 3       Current Outpatient Medications on File Prior to Visit   Medication Sig Dispense Refill    atenoloL (TENORMIN) 100 mg tablet TAKE 1 TABLET BY MOUTH EVERY DAY 90 Tablet 1    hydroCHLOROthiazide (HYDRODIURIL) 25 mg tablet TAKE 1/2 TABLET BY MOUTH EVERY DAY 45 Tablet 1    levothyroxine (synthroid) 50 mcg tablet Take 1 Tablet by mouth Daily (before breakfast). 90 Tablet 3    Tioconazole-1 6.5 % oint       OTHER,NON-FORMULARY, cbd cream      multivitamin (ONE A DAY) tablet Take 1 Tablet by mouth daily.  CALCIUM-MAGNESIUM-VITAMIN D2 PO Take 2 Tabs by mouth two (2) times a day.  fish oil-omega-3 fatty acids 300-500 mg cap Take 2 Tabs by mouth daily.  glucosamine-chondroitin (ARTHX) 500-400 mg cap Take 3-5 Caps by mouth daily.       estradioL (VIVELLE) 0.0375 mg/24 hr 1 Patch by TransDERmal route two (2) days a week. Sundays and Thursdays       No current facility-administered medications on file prior to visit. Allergies   Allergen Reactions    Nitrofurantoin Nausea and Vomiting       Family History   Problem Relation Age of Onset    Breast Cancer Mother 80    Hypertension Mother     Colon Cancer Father    Nury Coral Arthritis-rheumatoid Father     Hypertension Father     Cancer Brother         stomach       Social History     Socioeconomic History    Marital status:    Tobacco Use    Smoking status: Former Smoker     Packs/day: 1.00     Years: 35.00     Pack years: 35.00    Smokeless tobacco: Never Used    Tobacco comment: Quit 4/1999   Vaping Use    Vaping Use: Never used   Substance and Sexual Activity    Alcohol use: Yes     Comment: ocassion    Drug use: Never         Review of Systems:       General: Denies headache, lethargy, fever, weight loss  Ears/Nose/Throat: Denies ear discharge, drainage, nosebleeds, hoarse voice, dental problems  Cardiovascular: Denies chest pain, shortness of breath  Lungs: Denies chest pain, breathing problems, wheezing, pneumonia  Stomach: Denies stomach pain, heartburn, constipation, irritable bowel  Skin: Denies rash, sores, open wounds  Musculoskeletal: bilateral hip replacement, doing well  Genitourinary: Denies dysuria, hematuria, polyuria  Gastrointestinal: Denies constipation, obstipation, diarrhea  Neurological: Denies changes in sight, smell, hearing, taste, seizures. Denies loss of consciousness.   Psychiatric: Denies depression, sleep pattern changes, anxiety, change in personality  Endocrine: Denies mood swings, heat or cold intolerance  Hematologic/Lymphatic: Denies anemia, purpura, petechia  Allergic/Immunologic: Denies swelling of throat, pain or swelling at lymph nodes      Physical Examination:    Visit Vitals  BP (!) 169/100 (BP 1 Location: Left upper arm, BP Patient Position: Sitting, BP Cuff Size: Large adult)   Pulse 64   Temp 97.3 °F (36.3 °C) (Tympanic)   Ht 5' 5\" (1.651 m)   Wt 185 lb (83.9 kg)   SpO2 97%   BMI 30.79 kg/m²        General: AOX3, no apparent distress  Psychiatric: mood and affect appropriate  Lungs: breathing is symmetric and unlabored bilaterally  Heart: regular rate and rhythm  Abdomen: no guarding  Head: normocephalic, atraumatic  Skin: No significant abnormalities, good turgor  Sensation intact to light touch: L1-S1 dermatomes  Muscular exam: 5/5 strength in all major muscle groups unless noted in specialty exam.    Extremities:      Left upper extremity: Full active and passive range of motion without pain, deformity, no open wound, strength 5/5 in all major muscle groups. Right upper extremity: Full active and passive range of motion without pain, deformity, no open wound, strength 5/5 in all major muscle groups. Right lower extremity: Well healed surgical wound is noted. Patient ambulates with no device and no limp. No deformity is noted. Circumduction of the hip does not cause arthritic pain as it had preoperatively. Strength testing is indicative of 5/5 strength at hip flexion, extension,  5/5 knee flexion and extension, tibialis anterior, EHL, and FHL. Sensation is intact to light touch in the L1-S1 dermatomes with lateral femoral cutaneous nerve function intact. Capillary refill is less than 2 seconds distally. Left lower extremity:  Well healed surgical wound is noted. Patient ambulates with no device and no limp. No deformity is noted. Circumduction of the hip does not cause arthritic pain as it had preoperatively. Strength testing is indicative of 5/5 strength at hip flexion, extension,  5/5 knee flexion and extension, tibialis anterior, EHL, and FHL. Sensation is intact to light touch in the L1-S1 dermatomes with lateral femoral cutaneous nerve function intact. Capillary refill is less than 2 seconds distally.     Diagnostics:    Pertinent Xrays:  Xrays are available of the bilateral hip. They indicate a hip arthroplasty in excellent position without evidence of loosening or failure. Leg lengths are equal.        Assessment: Aftercare, status post bilateral hip arthroplasty    Plan:   will continue physical therapy exercises. We discussed the importance of continued vigilance to this end. We also discussed dental prophylaxis and safe activities and reasonable life choices regarding activity level. Patient stated their understanding. Pain control for now will be as needed only, and will be patient directed.  will follow up in 2 years. Bilateral hip x-rays on follow up. Ms. Meet Nichols has a reminder for a \"due or due soon\" health maintenance. I have asked that she contact her primary care provider for follow-up on this health maintenance.

## 2022-03-19 PROBLEM — M16.0 BILATERAL PRIMARY OSTEOARTHRITIS OF HIP: Status: ACTIVE | Noted: 2021-03-22

## 2022-04-21 ENCOUNTER — OFFICE VISIT (OUTPATIENT)
Dept: FAMILY MEDICINE CLINIC | Age: 72
End: 2022-04-21
Payer: COMMERCIAL

## 2022-04-21 VITALS
TEMPERATURE: 97.8 F | OXYGEN SATURATION: 98 % | RESPIRATION RATE: 16 BRPM | HEART RATE: 68 BPM | DIASTOLIC BLOOD PRESSURE: 80 MMHG | WEIGHT: 188.38 LBS | SYSTOLIC BLOOD PRESSURE: 132 MMHG | BODY MASS INDEX: 31.39 KG/M2 | HEIGHT: 65 IN

## 2022-04-21 DIAGNOSIS — I10 ESSENTIAL HYPERTENSION: Primary | ICD-10-CM

## 2022-04-21 DIAGNOSIS — E66.09 CLASS 1 OBESITY DUE TO EXCESS CALORIES WITH SERIOUS COMORBIDITY AND BODY MASS INDEX (BMI) OF 31.0 TO 31.9 IN ADULT: ICD-10-CM

## 2022-04-21 PROCEDURE — 99214 OFFICE O/P EST MOD 30 MIN: CPT | Performed by: NURSE PRACTITIONER

## 2022-04-21 NOTE — PROGRESS NOTES
Subjective  Chief Complaint   Patient presents with    Follow-up     HTN     HPI:  Justa Perla is a 67 y.o. female. Patient presents for management of hypertension. Aware of weight gain. Attributes weight gain to surgery recovery, excess sweets, and lots of carbs. Working on increasing vegetable and fruit intake. Management of HTN-  Current meds: Atenolol 100 mg, hydrochlorothiazide 25 mg  Home BP readings: does not check, has a cuff  Denies lightheadedness, dizziness, headaches, chest pain, palpitations, and lower extremity edema.     Past Medical History:   Diagnosis Date    Acquired hypothyroidism     Arthritis     Essential hypertension     History of mammogram 01/06/2021    History of Papanicolaou smear of cervix 01/2021    Pure hypercholesterolemia     patient denies hx    Vitamin D deficiency      Family History   Problem Relation Age of Onset    Breast Cancer Mother 80    Hypertension Mother     Colon Cancer Father     Arthritis-rheumatoid Father     Hypertension Father     Cancer Brother         stomach     Social History     Socioeconomic History    Marital status:      Spouse name: Not on file    Number of children: Not on file    Years of education: Not on file    Highest education level: Not on file   Occupational History    Not on file   Tobacco Use    Smoking status: Former Smoker     Packs/day: 1.00     Years: 35.00     Pack years: 35.00    Smokeless tobacco: Never Used    Tobacco comment: Quit 4/1999   Vaping Use    Vaping Use: Never used   Substance and Sexual Activity    Alcohol use: Yes     Comment: ocassion    Drug use: Never    Sexual activity: Not on file   Other Topics Concern    Not on file   Social History Narrative    Not on file     Social Determinants of Health     Financial Resource Strain:     Difficulty of Paying Living Expenses: Not on file   Food Insecurity:     Worried About [address] in the Last Year: Not on file    [employer] in the Last Year: Not on file   Transportation Needs:     Lack of Transportation (Medical): Not on file    Lack of Transportation (Non-Medical): Not on file   Physical Activity:     Days of Exercise per Week: Not on file    Minutes of Exercise per Session: Not on file   Stress:     Feeling of Stress : Not on file   Social Connections:     Frequency of Communication with Friends and Family: Not on file    Frequency of Social Gatherings with Friends and Family: Not on file    Attends Latter-day Services: Not on file    Active Member of [address] [place] [address] [employer] or Organizations: Not on file    Attends Club or Organization Meetings: Not on file    Marital Status: Not on file   Intimate Partner Violence:     Fear of Current or Ex-Partner: Not on file    Emotionally Abused: Not on file    Physically Abused: Not on file    Sexually Abused: Not on file   Housing Stability:     Unable to Pay for Housing in the Last Year: Not on file    Number of [place] in the Last Year: Not on file    Unstable Housing in the Last Year: Not on file     Current Outpatient Medications on File Prior to Visit   Medication Sig Dispense Refill    atenoloL (TENORMIN) 100 mg tablet TAKE 1 TABLET BY MOUTH EVERY DAY 90 Tablet 1    hydroCHLOROthiazide (HYDRODIURIL) 25 mg tablet TAKE 1/2 TABLET BY MOUTH EVERY DAY 45 Tablet 1    levothyroxine (synthroid) 50 mcg tablet Take 1 Tablet by mouth Daily (before breakfast). 90 Tablet 3    Tioconazole-1 6.5 % oint       multivitamin (ONE A DAY) tablet Take 1 Tablet by mouth daily.  CALCIUM-MAGNESIUM-VITAMIN D2 PO Take 2 Tabs by mouth two (2) times a day.  fish oil-omega-3 fatty acids 300-500 mg cap Take 2 Tabs by mouth daily.  glucosamine-chondroitin (ARTHX) 500-400 mg cap Take 3-5 Caps by mouth daily.  estradioL (VIVELLE) 0.0375 mg/24 hr 1 Patch by TransDERmal route two (2) days a week.  Sundays and Thursdays      OTHER,NON-FORMULARY, cbd cream       No current facility-administered medications on file prior to visit. Allergies   Allergen Reactions    Nitrofurantoin Nausea and Vomiting     ROS  See HPI for pertinent ROS. Objective  Visit Vitals  /80 (BP 1 Location: Left upper arm, BP Patient Position: Sitting)   Pulse 68   Temp 97.8 °F (36.6 °C) (Temporal)   Resp 16   Ht 5' 5\" (1.651 m)   Wt 188 lb 6 oz (85.4 kg)   SpO2 98%   BMI 31.35 kg/m²       Physical Exam  Vitals and nursing note reviewed. Constitutional:       General: She is not in acute distress. Appearance: Normal appearance. She is obese. HENT:      Head: Normocephalic. Eyes:      Extraocular Movements: Extraocular movements intact. Cardiovascular:      Rate and Rhythm: Normal rate and regular rhythm. Heart sounds: Normal heart sounds. Pulmonary:      Effort: Pulmonary effort is normal.      Breath sounds: Normal breath sounds. Musculoskeletal:         General: Normal range of motion. Right lower leg: No edema. Left lower leg: No edema. Skin:     General: Skin is warm and dry. Neurological:      Mental Status: She is alert and oriented to person, place, and time. Psychiatric:         Mood and Affect: Mood normal.         Behavior: Behavior normal.          Assessment & Plan      ICD-10-CM ICD-9-CM    1. Essential hypertension  I10 401.9    2. Class 1 obesity due to excess calories with serious comorbidity and body mass index (BMI) of 31.0 to 31.9 in adult  E66.09 278.00     Z68.31 V85.31      Diagnoses and all orders for this visit:    1. Essential hypertension  BP is below goal in the office today. Reinforced importance of her BP monitoring and encouraged to check at least weekly. Call if consistently greater than 150/90 on either number. 2. Class 1 obesity due to excess calories with serious comorbidity and body mass index (BMI) of 31.0 to 31.9 in adult  Weight is up approximately 11 pounds since October.   Increase regular exercise and limit carbohydrate intake. Follow-up and Dispositions    · Return in about 6 months (around 10/21/2022) for wellness, fasting labs, follow up, chronic conditions/meds.            Aminata Umanzor NP

## 2022-04-21 NOTE — PROGRESS NOTES
Chief Complaint   Patient presents with    Follow-up     HTN   1. Have you been to the ER, urgent care clinic since your last visit? Hospitalized since your last visit? No    2. Have you seen or consulted any other health care providers outside of the [address] [place] [address] since your last visit? Include any pap smears or colon screening.  No   3 most recent PHQ Screens 4/21/2022   Little interest or pleasure in doing things Not at all   Feeling down, depressed, irritable, or hopeless Not at all   Total Score PHQ 2 0     Visit Vitals  /80 (BP 1 Location: Left upper arm, BP Patient Position: Sitting)   Pulse 68   Temp 97.8 °F (36.6 °C) (Temporal)   Resp 16   Ht 5' 5\" (1.651 m)   Wt 188 lb 6 oz (85.4 kg)   SpO2 98%   BMI 31.35 kg/m²

## 2022-05-05 ENCOUNTER — TELEPHONE (OUTPATIENT)
Dept: FAMILY MEDICINE CLINIC | Age: 72
End: 2022-05-05

## 2022-05-05 RX ORDER — HYDROCHLOROTHIAZIDE 25 MG/1
TABLET ORAL
Qty: 45 TABLET | Refills: 1 | Status: SHIPPED | OUTPATIENT
Start: 2022-05-05

## 2022-05-05 RX ORDER — BLOOD SUGAR DIAGNOSTIC
STRIP MISCELLANEOUS
Qty: 100 STRIP | Refills: 3 | Status: SHIPPED | OUTPATIENT
Start: 2022-05-05

## 2022-05-05 NOTE — TELEPHONE ENCOUNTER
----- Message from [facility] sent at 5/5/2022  1:27 PM EDT -----  Subject: Refill Request    QUESTIONS  Name of Medication? Other - Ultra Onetouch Mini Meter   Patient-reported dosage and instructions? use daily   How many days do you have left? 0  Preferred Pharmacy? [id number]  Pharmacy phone number (if available)? [phone]  Additional Information for Provider? Patient stated that she is one point   out of the glucose range and she wants to start testing it again. Patient   would also need a 90 supply for the test strips sent in as well. Patient   also wanted to mention that she is only asking for a new meter because she   has one but it is several years old. Please advise.   ---------------------------------------------------------------------------  --------------  CALL BACK INFO  What is the best way for the office to contact you? OK to leave message on   voicemail  Preferred Call Back Phone Number? [phone]  ---------------------------------------------------------------------------  --------------  SCRIPT ANSWERS  Relationship to Patient?  Self

## 2022-05-05 NOTE — TELEPHONE ENCOUNTER
It still won't cover it without at least a diagnosis of prediabetes.  Her glucose was 100 which is usually considered normal, just not by [facility].

## 2022-05-05 NOTE — TELEPHONE ENCOUNTER
----- Message from Eugenio Hicks sent at 5/5/2022  3:57 PM EDT -----  Subject: Message to Provider    QUESTIONS  Information for Provider? Please provide patient with call back. Nurse   called her and she missed call. Has been trying to call number back with   option code and it wasn't letting her call and sent her voicemail.   ---------------------------------------------------------------------------  --------------  [address]  What is the best way for the office to contact you? OK to leave message on   voicemail  Preferred Call Back Phone Number?  [phone]  ---------------------------------------------------------------------------  --------------  SCRIPT ANSWERS  undefined

## 2022-07-20 NOTE — PERIOP NOTES
[address] [facility]    Progress Note    Subjective:    Remains sedated and intubated. Nursing reports that the ventilator settings are being gradually decreased with hopes for extubation tomorrow. Family at bedside and I spoke with them as well. Objective:    Vitals:    07/20/22 0731 07/20/22 0800 07/20/22 0900 07/20/22 1423   BP: (!) 128/41 (!) 133/42 (!) 131/42    Pulse: (!) 105 (!) 108 (!) 109 (!) 112   Resp: 22 21 21 25   Temp:  97.7 °F (36.5 °C)     TempSrc:  Temporal     SpO2: 96% 94% 93% 94%   Weight:         I/O last 3 completed shifts: In: 1833.6 [I.V.:1833.6]  Out: 975 [Urine:975]     Abdomen is obese but soft. Does not appear tender but exam is unreliable at present. No mass or organomegaly. No distention. Bowel sounds present. Bilateral lower extremities show fiery erythema of both with the left being worse than right. LABS:   CBC:   Recent Labs     07/19/22  1448 07/20/22  0515   WBC 7.7 6.7   RBC 3.03* 3.53*   HGB 9.4* 11.1*   HCT 32.6* 35.6*    163   BANDSPCT 23*  --    METASPCT 5*  --    LYMPHOPCT 3.0* 7.0*   MONOPCT 4.0 0.0   MYELOPCT 1*  --    BASOPCT 0.0 0.0   MONOSABS 0.30 0.00   LYMPHSABS 0.2* 0.5*   EOSABS 0.00 0.00   BASOSABS 0.00 0.00      BMP:   Recent Labs     07/19/22  1800 07/19/22  1954 07/19/22  2215 07/19/22  2311 07/20/22  0345 07/20/22  0515   *  --  148*  --   --  149*   K 3.8   < > 3.7 3.5 2.7 2.7*   *  --  114*  --   --  110   CO2 13*  --  13*  --   --  22   ANIONGAP 20*  --  21*  --   --  17   GLUCOSE 226*  --  243*  --   --  163*   CREATININE 0.9  --  1.0*  --   --  0.7   LABGLOM >60  --  56*  --   --  >60   CALCIUM 8.1*  --  8.1*  --   --  7.6*    < > = values in this interval not displayed. LFT:   Recent Labs     07/19/22  1800 07/19/22  2215 07/20/22  0515   PROT 5.7* 5.6* 5.0*   LABALBU 3.2* 3.1* 2.5*   BILITOT 1.8* 1.4* 1.0   ALKPHOS 93 98 80   ALT 12 13 10   AST 16 16 14       Assessment:    1.   Left-sided Incentive Spirometer        Using the incentive spirometer helps expand the small air sacs of your lungs, helps you breathe deeply, and helps improve your lung function. Use your incentive spirometer twice a day (10 breaths each time) prior to surgery. How to Use Your Incentive Spirometer:  1. Hold the incentive spirometer in an upright position. 2. Breathe out as usual.   3. Place the mouthpiece in your mouth and seal your lips tightly around it. 4. Take a deep breath. Breathe in slowly and as deeply as possible. Keep the blue flow rate guide between the arrows. 5. Hold your breath as long as possible. Then exhale slowly and allow the piston to fall to the bottom of the column. 6. Rest for a few seconds and repeat steps one through five at least 10 times. PAT Tidal Volume___1000_______________  x_2_______________  Date_3/9/21______________________    Robby Cordial THE INCENTIVE SPIROMETER WITH YOU TO THE HOSPITAL ON THE DAY OF YOUR SURGERY. Opportunity given to ask and answer questions as well as to observe return demonstration.     Patient signature_____________________________    Witness____________________________ retroperitoneal mass secondary to resolving retroperitoneal hematoma incurred in November last year. No evidence for retroperitoneal tumor or acute process seen on recent CT. 2.  Bilateral lower extremity cellulitis. 3.  Chest x-ray suggestive of bilateral pneumonia  4. Ventilator dependent respiratory failure secondary to #3. 4.  Hypotension with sepsis secondary to #2 and 3. Plan:    1. Continue with medical care per Dr. Suma Perez and the hospitalist service. 2.  With no acute surgery issue present I will sign off. Please call if general surgery can be of further help.

## 2022-08-04 RX ORDER — ATENOLOL 100 MG/1
TABLET ORAL
Qty: 90 TABLET | Refills: 1 | Status: SHIPPED | OUTPATIENT
Start: 2022-08-04

## 2022-10-26 ENCOUNTER — OFFICE VISIT (OUTPATIENT)
Dept: FAMILY MEDICINE CLINIC | Age: 72
End: 2022-10-26
Payer: COMMERCIAL

## 2022-10-26 VITALS
SYSTOLIC BLOOD PRESSURE: 136 MMHG | HEART RATE: 68 BPM | RESPIRATION RATE: 16 BRPM | OXYGEN SATURATION: 97 % | DIASTOLIC BLOOD PRESSURE: 88 MMHG | HEIGHT: 65 IN | TEMPERATURE: 97.3 F | WEIGHT: 176 LBS | BODY MASS INDEX: 29.32 KG/M2

## 2022-10-26 DIAGNOSIS — Z78.0 ASYMPTOMATIC POSTMENOPAUSAL STATE: ICD-10-CM

## 2022-10-26 DIAGNOSIS — Z28.20 VACCINE REFUSED BY PATIENT: ICD-10-CM

## 2022-10-26 DIAGNOSIS — Z13.220 SCREENING FOR HYPERLIPIDEMIA: ICD-10-CM

## 2022-10-26 DIAGNOSIS — Z00.00 WELLNESS EXAMINATION: Primary | ICD-10-CM

## 2022-10-26 DIAGNOSIS — E78.00 PURE HYPERCHOLESTEROLEMIA: ICD-10-CM

## 2022-10-26 DIAGNOSIS — R73.9 HYPERGLYCEMIA: ICD-10-CM

## 2022-10-26 DIAGNOSIS — I10 ESSENTIAL HYPERTENSION: ICD-10-CM

## 2022-10-26 DIAGNOSIS — E66.3 OVERWEIGHT WITH BODY MASS INDEX (BMI) OF 29 TO 29.9 IN ADULT: ICD-10-CM

## 2022-10-26 DIAGNOSIS — E03.9 ACQUIRED HYPOTHYROIDISM: ICD-10-CM

## 2022-10-26 DIAGNOSIS — Z23 ENCOUNTER FOR IMMUNIZATION: ICD-10-CM

## 2022-10-26 PROCEDURE — 3078F DIAST BP <80 MM HG: CPT | Performed by: NURSE PRACTITIONER

## 2022-10-26 PROCEDURE — 90471 IMMUNIZATION ADMIN: CPT | Performed by: NURSE PRACTITIONER

## 2022-10-26 PROCEDURE — 3074F SYST BP LT 130 MM HG: CPT | Performed by: NURSE PRACTITIONER

## 2022-10-26 PROCEDURE — 99214 OFFICE O/P EST MOD 30 MIN: CPT | Performed by: NURSE PRACTITIONER

## 2022-10-26 PROCEDURE — 90694 VACC AIIV4 NO PRSRV 0.5ML IM: CPT | Performed by: NURSE PRACTITIONER

## 2022-10-26 PROCEDURE — 99397 PER PM REEVAL EST PAT 65+ YR: CPT | Performed by: NURSE PRACTITIONER

## 2022-10-26 NOTE — PATIENT INSTRUCTIONS
Receive tenivac vaccine from pharmacy. Ask about any copays before receiving vaccine. The best way to stay healthy is to live a healthy lifestyle. A healthy lifestyle includes regular exercise, eating a well-balanced diet, keeping a healthy weight and not smoking. Regular physical exams and screening tests are another important way to take care of yourself. Preventive exams provided by health care providers can find health problems early when treatment works best and can keep you from getting certain diseases or illnesses. Preventive services include exams, lab tests, screenings, shots, monitoring and information to help you take care of your own health. All people over 65 should have a pneumonia shot. Pneumonia shots are usually only needed once in a lifetime unless your doctor decides differently. In addition to your physical exam, some screening tests are recommended:    All people over 65 should have a yearly flu shot. People over 65 are at medium to high risk for Hepatitis B. Three shots are needed for complete protection. Bone mass measurement (dexa scan) is recommended every two years. Diabetes Mellitus screening is recommended every year. Glaucoma is an eye disease caused by high pressure in the eye. An eye exam is recommended every year. Cardiovascular screening tests that check your cholesterol and other blood fat (lipid) levels are recommended every five years. Colorectal Cancer screening tests help to find pre-cancerous polyps (growths in the colon) so they can be removed before they turn into cancer. Tests ordered for screening depend on your personal and family history risk factors. Prostate Cancer Screening (annually up to age 76)    Screening for breast cancer is recommended yearly with a Mammogram.    Screening for cervical and vaginal cancer is recommended with a pelvic and Pap test every two years.  However if you have had an abnormal pap in the past three years or at high risk for cervical or vaginal cancer Medicare will cover a pap test and a pelvic exam every year.      Here is a list of your current Health Maintenance items with a due date:  Health Maintenance Due   Topic Date Due    COVID-19 Vaccine (1) Never done    Shingles Vaccine (1 of 2) Never done    Bone Mineral Density   Never done    Pneumococcal Vaccine (1 - PCV) Never done    DTaP/Tdap/Td  (2 - Td or Tdap) 11/01/2020    Yearly Flu Vaccine (1) 08/01/2022

## 2022-10-26 NOTE — PROGRESS NOTES
Subjective  Chief Complaint   Patient presents with    Annual Wellness Visit    Follow Up Chronic Condition     HPI:  Emily Mauricio is a 67 y.o. female. Patient presents for wellness, fasting labs, and management of HTN, hypercholesterolemia, and hypothyroidism. Understands this is considered two appointments and there may be a copay for the chronic disease management portion of the visit. For chronic disease management:  Medications reviewed, taking as prescribed with no known side effects. Weight is down 13 pounds on home scale by improving diet and increasing exercise. Manages HLD with diet and exercise.      Management of HTN-  Current meds: Atenolol 100 mg, hydrochlorothiazide 25 mg  Home BP readings: does not check regularly    For wellness:  Immunizations:  Flu: due   COVID: declines  Tetanus: recommended  Shingrix: declines  Prevnar 20: declines    HCV screening: complete  LMP: hysterectomy  Pap: no longer indicated  Mammo: 2022  Dexa: ordered  Colon cancer screenin  Smoking status: former    Moods: at goal  PHQ2: 0/2  Diet: healthy  Exercise: regular  Vision exams: annual  Dental exams: every 6 months    Past Medical History:   Diagnosis Date    Acquired hypothyroidism     Arthritis     Essential hypertension     History of mammogram 2021    History of Papanicolaou smear of cervix 2021    Pure hypercholesterolemia     patient denies hx    Vitamin D deficiency      Family History   Problem Relation Age of Onset    Breast Cancer Mother 80    Hypertension Mother     Colon Cancer Father     Arthritis-rheumatoid Father     Hypertension Father     Cancer Brother         stomach     Social History     Socioeconomic History    Marital status:      Spouse name: Not on file    Number of children: Not on file    Years of education: Not on file    Highest education level: Not on file   Occupational History    Not on file   Tobacco Use    Smoking status: Former     Packs/day: 1.00     Years: 35.00     Pack years: 35.00     Types: Cigarettes    Smokeless tobacco: Never    Tobacco comments:     Quit 4/1999   Vaping Use    Vaping Use: Never used   Substance and Sexual Activity    Alcohol use: Yes     Comment: ocassion    Drug use: Never    Sexual activity: Not on file   Other Topics Concern    Not on file   Social History Narrative    Not on file     Social Determinants of Health     Financial Resource Strain: Medium Risk    Difficulty of Paying Living Expenses: Somewhat hard   Food Insecurity: No Food Insecurity    Worried About Running Out of Food in the Last Year: Never true    Ran Out of Food in the Last Year: Never true   Transportation Needs: Not on file   Physical Activity: Not on file   Stress: Not on file   Social Connections: Not on file   Intimate Partner Violence: Not on file   Housing Stability: Not on file     Current Outpatient Medications on File Prior to Visit   Medication Sig Dispense Refill    atenoloL (TENORMIN) 100 mg tablet TAKE 1 TABLET BY MOUTH EVERY DAY 90 Tablet 1    OneTouch Ultra Test strip USE AS DIRECTED ONCE DAILY 100 Strip 3    hydroCHLOROthiazide (HYDRODIURIL) 25 mg tablet TAKE 1/2 TABLET BY MOUTH EVERY DAY 45 Tablet 1    levothyroxine (synthroid) 50 mcg tablet Take 1 Tablet by mouth Daily (before breakfast). 90 Tablet 3    multivitamin (ONE A DAY) tablet Take 1 Tablet by mouth daily. CALCIUM-MAGNESIUM-VITAMIN D2 PO Take 2 Tabs by mouth two (2) times a day. fish oil-omega-3 fatty acids 300-500 mg cap Take 2 Tabs by mouth daily. glucosamine-chondroitin (ARTHX) 500-400 mg cap Take 3-5 Caps by mouth daily. estradioL (VIVELLE) 0.0375 mg/24 hr 1 Patch by TransDERmal route two (2) days a week. Sundays and Thursdays      OTHER,NON-FORMULARY, cbd cream      [DISCONTINUED] Tioconazole-1 6.5 % oint  (Patient not taking: Reported on 10/26/2022)       No current facility-administered medications on file prior to visit.      Allergies   Allergen Reactions Nitrofurantoin Nausea and Vomiting     Review of Systems   Constitutional:  Positive for weight loss (intentional). Negative for chills and fever. HENT:  Negative for hearing loss. Denies difficulty swallowing. Eyes:  Negative for blurred vision. Respiratory:  Negative for cough, shortness of breath and wheezing. Cardiovascular:  Negative for chest pain, palpitations and leg swelling. Gastrointestinal:  Negative for abdominal pain, constipation, diarrhea and heartburn. Genitourinary:  Negative for dysuria. Musculoskeletal:  Positive for joint pain. Negative for myalgias. Neurological:  Negative for dizziness, tingling, weakness and headaches. Psychiatric/Behavioral:  Negative for depression. The patient is not nervous/anxious. Objective  Visit Vitals  /88 (BP 1 Location: Left upper arm, BP Patient Position: Sitting, BP Cuff Size: Adult)   Pulse 68   Temp 97.3 °F (36.3 °C) (Temporal)   Resp 16   Ht 5' 5\" (1.651 m)   Wt 176 lb (79.8 kg)   SpO2 97%   BMI 29.29 kg/m²       Physical Exam  Vitals and nursing note reviewed. Constitutional:       General: She is not in acute distress. Appearance: Normal appearance. She is overweight. HENT:      Head: Normocephalic. Eyes:      Extraocular Movements: Extraocular movements intact. Neck:      Thyroid: No thyroid mass, thyromegaly or thyroid tenderness. Cardiovascular:      Rate and Rhythm: Normal rate and regular rhythm. Heart sounds: Normal heart sounds. Pulmonary:      Effort: Pulmonary effort is normal.      Breath sounds: Normal breath sounds. Abdominal:      General: Bowel sounds are normal.      Palpations: Abdomen is soft. There is no mass. Tenderness: There is no abdominal tenderness. Musculoskeletal:         General: Normal range of motion. Cervical back: Normal range of motion and neck supple. Right lower leg: No edema. Left lower leg: No edema.    Lymphadenopathy: Cervical: No cervical adenopathy. Upper Body:      Right upper body: No supraclavicular adenopathy. Left upper body: No supraclavicular adenopathy. Skin:     General: Skin is warm and dry. Neurological:      General: No focal deficit present. Mental Status: She is alert and oriented to person, place, and time. Psychiatric:         Mood and Affect: Mood normal.         Behavior: Behavior normal.         Thought Content: Thought content normal.         Judgment: Judgment normal.        Assessment & Plan      ICD-10-CM ICD-9-CM    1. Wellness examination  Z00.00 V70.0       2. Screening for hyperlipidemia  Z13.220 V77.91 CBC WITH AUTOMATED DIFF      LIPID PANEL      METABOLIC PANEL, COMPREHENSIVE      3. Overweight with body mass index (BMI) of 29 to 29.9 in adult  E66.3 278.02     Z68.29 V85.25       4. Encounter for immunization  Z23 V03.89 INFLUENZA, FLUAD, (AGE 72 Y+), IM, PF, 0.5 ML      5. Vaccine refused by patient  Z28.20 V64.09       6. Asymptomatic postmenopausal state  Z78.0 V49.81 DEXA BONE DENSITY STUDY AXIAL      7. Essential hypertension  Y57 266.8 METABOLIC PANEL, COMPREHENSIVE      8. Pure hypercholesterolemia  E78.00 272.0 CBC WITH AUTOMATED DIFF      LIPID PANEL      9. Acquired hypothyroidism  E03.9 244.9 TSH 3RD GENERATION      10. Hyperglycemia  R73.9 790.29 HEMOGLOBIN A1C WITH EAG        Diagnoses and all orders for this visit:    1. Wellness examination  We are getting patient up-to-date on preventative measures as listed. 2. Screening for hyperlipidemia  -     CBC WITH AUTOMATED DIFF  -     LIPID PANEL  -     METABOLIC PANEL, COMPREHENSIVE    3. Overweight with body mass index (BMI) of 29 to 29.9 in adult  Praised for positive lifestyle changes resulting in weight loss. Continue to eat a healthy diet and exercise regularly. 4. Encounter for immunization  Advised to receive Tenivac vaccine from pharmacy and cautioned there may be an out-of-pocket expense.    - INFLUENZA, FLUAD, (AGE 65 Y+), IM, PF, 0.5 ML    5. Vaccine refused by patient  Declines COVID, Shingrix, and pneumonia vaccines. Understands risks. 6. Asymptomatic postmenopausal state  -     DEXA BONE DENSITY STUDY AXIAL; Future    7. Essential hypertension  BP is below goal in the office today. Discussed the importance of home BP monitoring. Check BP readings 1 to 2 hours after taking medication and after sitting quietly for 5 minutes with both feet flat on the floor and arm at heart level. Call if readings are consistently greater than 150/90 on either number. Increase regular exercise, limit salt intake, and stay well-hydrated. -     METABOLIC PANEL, COMPREHENSIVE    8. Pure hypercholesterolemia  Checking annual labs. Continue to exercise regularly and limit fat/cholesterol intake. -     CBC WITH AUTOMATED DIFF  -     LIPID PANEL    9. Acquired hypothyroidism  Checking annual labs. Take med daily at the same time on an empty stomach, at least 30 minutes before or two hours after a meal, and separate from other meds including OTC, minerals, and vitamins by at least 2 hours. -     TSH 3RD GENERATION    10. Hyperglycemia  Lab Results   Component Value Date/Time    Glucose 100 (H) 10/21/2021 11:38 AM   Checking A1c with labs today.  -     HEMOGLOBIN A1C WITH EAG      Aspects of this note may have been generated using voice recognition software. Despite editing, there may be some syntax errors. Follow-up and Dispositions    Return in about 6 months (around 4/26/2023) for follow up, hypertension, nonfasting. I have discussed the diagnosis with the patient and the intended plan as seen in the above orders. The patient has received an after-visit summary and questions were answered concerning future plans. I have discussed medication side effects and warnings with the patient as well.     Marta Stone NP

## 2022-10-26 NOTE — PROGRESS NOTES
Chief Complaint   Patient presents with    Annual Wellness Visit    Follow Up Chronic Condition    1. \"Have you been to the ER, urgent care clinic since your last visit? Hospitalized since your last visit? \" No    2. \"Have you seen or consulted any other health care providers outside of the [address] [place] [address] since your last visit? \" No     3. For patients aged 39-70: Has the patient had a colonoscopy / FIT/ Cologuard? Yes - no Care Gap present      If the patient is female:    4. For patients aged 41-77: Has the patient had a mammogram within the past 2 years? Yes - no Care Gap present      5. For patients aged 21-65: Has the patient had a pap smear?  Yes - no Care Gap present Visit Vitals  /88 (BP 1 Location: Left upper arm, BP Patient Position: Sitting, BP Cuff Size: Adult)   Pulse 68   Temp 97.3 °F (36.3 °C) (Temporal)   Resp 16   Ht 5' 5\" (1.651 m)   Wt 176 lb (79.8 kg)   SpO2 97%   BMI 29.29 kg/m²      3 most recent PHQ Screens 10/26/2022   Little interest or pleasure in doing things Not at all   Feeling down, depressed, irritable, or hopeless Not at all   Total Score PHQ 2 0

## 2022-10-27 LAB
ALBUMIN SERPL-MCNC: 4.6 G/DL (ref 3.7–4.7)
ALBUMIN/GLOB SERPL: 2 {RATIO} (ref 1.2–2.2)
ALP SERPL-CCNC: 68 IU/L (ref 44–121)
ALT SERPL-CCNC: 15 IU/L (ref 0–32)
AST SERPL-CCNC: 24 IU/L (ref 0–40)
BASOPHILS # BLD AUTO: 0 X10E3/UL (ref 0–0.2)
BASOPHILS NFR BLD AUTO: 1 %
BILIRUB SERPL-MCNC: 0.5 MG/DL (ref 0–1.2)
BUN SERPL-MCNC: 20 MG/DL (ref 8–27)
BUN/CREAT SERPL: 25 (ref 12–28)
CALCIUM SERPL-MCNC: 9.7 MG/DL (ref 8.7–10.3)
CHLORIDE SERPL-SCNC: 99 MMOL/L (ref 96–106)
CHOLEST SERPL-MCNC: 215 MG/DL (ref 100–199)
CO2 SERPL-SCNC: 23 MMOL/L (ref 20–29)
CREAT SERPL-MCNC: 0.81 MG/DL (ref 0.57–1)
EGFR: 77 ML/MIN/1.73
EOSINOPHIL # BLD AUTO: 0.1 X10E3/UL (ref 0–0.4)
EOSINOPHIL NFR BLD AUTO: 2 %
ERYTHROCYTE [DISTWIDTH] IN BLOOD BY AUTOMATED COUNT: 14 % (ref 11.7–15.4)
EST. AVERAGE GLUCOSE BLD GHB EST-MCNC: 114 MG/DL
GLOBULIN SER CALC-MCNC: 2.3 G/DL (ref 1.5–4.5)
GLUCOSE SERPL-MCNC: 101 MG/DL (ref 70–99)
HBA1C MFR BLD: 5.6 % (ref 4.8–5.6)
HCT VFR BLD AUTO: 44.6 % (ref 34–46.6)
HDLC SERPL-MCNC: 71 MG/DL
HGB BLD-MCNC: 14.7 G/DL (ref 11.1–15.9)
IMM GRANULOCYTES # BLD AUTO: 0 X10E3/UL (ref 0–0.1)
IMM GRANULOCYTES NFR BLD AUTO: 0 %
LDLC SERPL CALC-MCNC: 121 MG/DL (ref 0–99)
LYMPHOCYTES # BLD AUTO: 1.4 X10E3/UL (ref 0.7–3.1)
LYMPHOCYTES NFR BLD AUTO: 19 %
MCH RBC QN AUTO: 27.3 PG (ref 26.6–33)
MCHC RBC AUTO-ENTMCNC: 33 G/DL (ref 31.5–35.7)
MCV RBC AUTO: 83 FL (ref 79–97)
MONOCYTES # BLD AUTO: 0.4 X10E3/UL (ref 0.1–0.9)
MONOCYTES NFR BLD AUTO: 5 %
NEUTROPHILS # BLD AUTO: 5.4 X10E3/UL (ref 1.4–7)
NEUTROPHILS NFR BLD AUTO: 73 %
PLATELET # BLD AUTO: 223 X10E3/UL (ref 150–450)
POTASSIUM SERPL-SCNC: 4.5 MMOL/L (ref 3.5–5.2)
PROT SERPL-MCNC: 6.9 G/DL (ref 6–8.5)
RBC # BLD AUTO: 5.39 X10E6/UL (ref 3.77–5.28)
SODIUM SERPL-SCNC: 138 MMOL/L (ref 134–144)
TRIGL SERPL-MCNC: 132 MG/DL (ref 0–149)
TSH SERPL DL<=0.005 MIU/L-ACNC: 2.91 UIU/ML (ref 0.45–4.5)
VLDLC SERPL CALC-MCNC: 23 MG/DL (ref 5–40)
WBC # BLD AUTO: 7.4 X10E3/UL (ref 3.4–10.8)

## 2022-10-27 NOTE — PROGRESS NOTES
Your blood count is unremarkable and you do not have anemia. Your LDL (bad cholesterol) is elevated.  In addition to healthy diet and regular exercise, I recommend atorvastatin to help bring this down and lower the chance of having a cardiovascular event. Please let me know if she is agreeable to this I will send the medication to the pharmacy. I would recheck levels 3 months after starting medication. Normal kidney and liver function. A1c is normal indicating no prediabetes or diabetes. Thyroid function is normal.  Continue levothyroxine 50 mcg daily.         The 10-year ASCVD risk score (Lisy MCCLENDON, et al., 2019) is: 19.1%

## 2022-10-28 ENCOUNTER — TELEPHONE (OUTPATIENT)
Dept: FAMILY MEDICINE CLINIC | Age: 72
End: 2022-10-28

## 2022-10-28 NOTE — PROGRESS NOTES
Patient advised. Patient will think about the atorvastatin and call us back and let us know what she decides.

## 2022-11-06 RX ORDER — HYDROCHLOROTHIAZIDE 25 MG/1
TABLET ORAL
Qty: 45 TABLET | Refills: 1 | Status: SHIPPED | OUTPATIENT
Start: 2022-11-06

## 2022-11-11 ENCOUNTER — TELEPHONE (OUTPATIENT)
Dept: ORTHOPEDIC SURGERY | Age: 72
End: 2022-11-11

## 2022-11-11 DIAGNOSIS — Z96.642 AFTERCARE FOLLOWING LEFT HIP JOINT REPLACEMENT SURGERY: ICD-10-CM

## 2022-11-11 DIAGNOSIS — Z47.1 AFTERCARE FOLLOWING LEFT HIP JOINT REPLACEMENT SURGERY: ICD-10-CM

## 2022-11-11 DIAGNOSIS — Z47.1 AFTERCARE FOLLOWING RIGHT HIP JOINT REPLACEMENT SURGERY: Primary | ICD-10-CM

## 2022-11-11 DIAGNOSIS — Z96.641 AFTERCARE FOLLOWING RIGHT HIP JOINT REPLACEMENT SURGERY: Primary | ICD-10-CM

## 2022-11-11 RX ORDER — AMOXICILLIN AND CLAVULANATE POTASSIUM 500; 125 MG/1; MG/1
4 TABLET, FILM COATED ORAL ONCE
Qty: 4 TABLET | Refills: 0 | Status: SHIPPED | OUTPATIENT
Start: 2022-11-11 | End: 2022-11-11

## 2022-11-22 ENCOUNTER — TELEPHONE (OUTPATIENT)
Dept: FAMILY MEDICINE CLINIC | Age: 72
End: 2022-11-22

## 2022-11-22 NOTE — TELEPHONE ENCOUNTER
Just an FYI -   Pt was transferred to me by the [facility] ([facility]) she was asking to speak to the  in regards to a bill she received . I advised her that the  was out this week but that I could take a message or if she explained to me what was going on maybe I could help . She explained she had her wellness visit done in October but received a bill for a $25 Co-pay. I asked her to please hold while I reviewed her chart . I reviewed her chart and spoke with  maxx , Looks like this visit was a wellness and chronic conditions were discussed as well as a referral for a DEXA . I advised pt I see that she had the wellness and a follow up of her chronic conditions plus an order placed for the DEXA . I advised her she probably got a bill for the Co-Pay as the DEXA and the Chronic conditions are out of the scope of a \"Wellness Visit\" , Pt states she did not mention anything at the visit she said she just answered the providers questions and that the DEXA was recommended . I read to her as well the note in her chart \"Patient presents for wellness, fasting labs, and management of HTN, hypercholesterolemia, and hypothyroidism. Understands this is considered two appointments and there may be a copay for the chronic disease management portion of the visit. \" . She said she does not think this is right and said she might as well just see a specialist and pay that way so she knows it is honest . I advised pt she can submit an appeal through her insurance for this claim , she will just need to call them and request it and they will assist her. She said when she calls them she was going to tell them that she got a bill due to the DEXA , I advised her that I was not sure that was the only reason she got the bill but it is a possibility but that it was because she was seen for chronic conditions as well and those both are coded outside of the wellness .  Pt said she may just cancel her appointments and just call us to get labs done and her meds refilled . I was able to resolve the situation with pt and she will be calling in [facility] .

## 2023-02-02 RX ORDER — ATENOLOL 100 MG/1
TABLET ORAL
Qty: 90 TABLET | Refills: 1 | Status: SHIPPED | OUTPATIENT
Start: 2023-02-02

## 2023-04-25 RX ORDER — HYDROCHLOROTHIAZIDE 25 MG/1
TABLET ORAL
Qty: 45 TABLET | Refills: 1 | Status: SHIPPED | OUTPATIENT
Start: 2023-04-25 | End: 2023-04-26 | Stop reason: DRUGHIGH

## 2023-04-26 ENCOUNTER — OFFICE VISIT (OUTPATIENT)
Dept: FAMILY MEDICINE CLINIC | Age: 73
End: 2023-04-26
Payer: COMMERCIAL

## 2023-04-26 VITALS
BODY MASS INDEX: 29.66 KG/M2 | HEART RATE: 63 BPM | HEIGHT: 65 IN | WEIGHT: 178 LBS | TEMPERATURE: 97.3 F | DIASTOLIC BLOOD PRESSURE: 88 MMHG | RESPIRATION RATE: 16 BRPM | OXYGEN SATURATION: 97 % | SYSTOLIC BLOOD PRESSURE: 138 MMHG

## 2023-04-26 DIAGNOSIS — Z78.0 ASYMPTOMATIC POSTMENOPAUSAL STATE: ICD-10-CM

## 2023-04-26 DIAGNOSIS — Z12.31 ENCOUNTER FOR SCREENING MAMMOGRAM FOR MALIGNANT NEOPLASM OF BREAST: ICD-10-CM

## 2023-04-26 DIAGNOSIS — I10 ESSENTIAL HYPERTENSION: Primary | ICD-10-CM

## 2023-04-26 DIAGNOSIS — Z28.20 VACCINE REFUSED BY PATIENT: ICD-10-CM

## 2023-04-26 DIAGNOSIS — E66.3 OVERWEIGHT: ICD-10-CM

## 2023-04-26 PROCEDURE — 3075F SYST BP GE 130 - 139MM HG: CPT | Performed by: NURSE PRACTITIONER

## 2023-04-26 PROCEDURE — 3079F DIAST BP 80-89 MM HG: CPT | Performed by: NURSE PRACTITIONER

## 2023-04-26 PROCEDURE — 99214 OFFICE O/P EST MOD 30 MIN: CPT | Performed by: NURSE PRACTITIONER

## 2023-04-26 PROCEDURE — 1123F ACP DISCUSS/DSCN MKR DOCD: CPT | Performed by: NURSE PRACTITIONER

## 2023-04-26 RX ORDER — HYDROCHLOROTHIAZIDE 25 MG/1
25 TABLET ORAL DAILY
COMMUNITY

## 2023-04-26 NOTE — PROGRESS NOTES
Chief Complaint   Patient presents with    Follow-up     hypertension    1. \"Have you been to the ER, urgent care clinic since your last visit? Hospitalized since your last visit? \" No    2. \"Have you seen or consulted any other health care providers outside of the [address] [place] [address] since your last visit? \" No     3. For patients aged 39-70: Has the patient had a colonoscopy / FIT/ Cologuard? Yes - no Care Gap present      If the patient is female:    4. For patients aged 41-77: Has the patient had a mammogram within the past 2 years? Yes - Care Gap present. Rooming MA/LPN to request most recent results      5. For patients aged 21-65: Has the patient had a pap smear?  Yes - no Care Gap present Visit Vitals  /88   Pulse 63   Temp 97.3 °F (36.3 °C) (Temporal)   Resp 16   Ht 5' 5\" (1.651 m)   Wt 178 lb (80.7 kg)   SpO2 97%   BMI 29.62 kg/m²      3 most recent PHQ Screens 4/26/2023   Little interest or pleasure in doing things Not at all   Feeling down, depressed, irritable, or hopeless Not at all   Total Score PHQ 2 0

## 2023-04-26 NOTE — PROGRESS NOTES
Subjective  Chief Complaint   Patient presents with    Follow-up     hypertension     HPI:  Chris Woods is a 75 y.o. female. Patient presents for management of HTN and BMI. Attributes weight loss to [employer]". Trying to eat healthier and limiting portions. Mammogram completed 1/2023 at [facility]. Management of HTN-  Current meds: Atenolol, HCTZ  Home BP readings: does not monitor, does not have a cuff  Denies lightheadedness, dizziness, headaches, chest pain, palpitations, and lower extremity edema. Increased HCTZ 3-4 weeks ago for concerns BP running high.      Past Medical History:   Diagnosis Date    Acquired hypothyroidism     Arthritis     Essential hypertension     History of mammogram 01/06/2021    History of Papanicolaou smear of cervix 01/2021    Pure hypercholesterolemia     Vitamin D deficiency      Family History   Problem Relation Age of Onset    Breast Cancer Mother 80    Hypertension Mother     Colon Cancer Father     Arthritis-rheumatoid Father     Hypertension Father     Cancer Brother         stomach     Social History     Socioeconomic History    Marital status:      Spouse name: Not on file    Number of children: Not on file    Years of education: Not on file    Highest education level: Not on file   Occupational History    Not on file   Tobacco Use    Smoking status: Former     Packs/day: 1.00     Years: 35.00     Pack years: 35.00     Types: Cigarettes    Smokeless tobacco: Never    Tobacco comments:     Quit 4/1999   Vaping Use    Vaping Use: Never used   Substance and Sexual Activity    Alcohol use: Yes     Comment: ocassion    Drug use: Never    Sexual activity: Not on file   Other Topics Concern    Not on file   Social History Narrative    Not on file     Social Determinants of Health     Financial Resource Strain: Medium Risk    Difficulty of Paying Living Expenses: Somewhat hard   Food Insecurity: No Food Insecurity    Worried About Running Out of Food in the Last Year: Never true    Ran Out of Food in the Last Year: Never true   Transportation Needs: Not on file   Physical Activity: Not on file   Stress: Not on file   Social Connections: Not on file   Intimate Partner Violence: Not on file   Housing Stability: Not on file     Current Outpatient Medications on File Prior to Visit   Medication Sig Dispense Refill    hydroCHLOROthiazide (HYDRODIURIL) 25 mg tablet Take 1 Tablet by mouth daily. atenoloL (TENORMIN) 100 mg tablet TAKE 1 TABLET BY MOUTH EVERY DAY 90 Tablet 1    synthroid 50 mcg tablet TAKE 1 TABLET BY MOUTH EVERY DAY BEFORE BREAKFAST 90 Tablet 3    OneTouch Ultra Test strip USE AS DIRECTED ONCE DAILY 100 Strip 3    OTHER,NON-FORMULARY, cbd cream      multivitamin (ONE A DAY) tablet Take 1 Tablet by mouth daily. CALCIUM-MAGNESIUM-VITAMIN D2 PO Take 2 Tabs by mouth two (2) times a day. glucosamine-chondroitin (ARTHX) 500-400 mg cap Take 3-5 Capsules by mouth daily. estradioL (VIVELLE) 0.0375 mg/24 hr 1 Patch by TransDERmal route two (2) days a week. Sundays and Thursdays      [DISCONTINUED] hydroCHLOROthiazide (HYDRODIURIL) 25 mg tablet TAKE 1/2 TABLET BY MOUTH EVERY DAY 45 Tablet 1    [DISCONTINUED] fish oil-omega-3 fatty acids 300-500 mg cap Take 2 Tabs by mouth daily. (Patient not taking: Reported on 4/26/2023)       No current facility-administered medications on file prior to visit. Allergies   Allergen Reactions    Nitrofurantoin Nausea and Vomiting         Objective  Visit Vitals  /88   Pulse 63   Temp 97.3 °F (36.3 °C) (Temporal)   Resp 16   Ht 5' 5\" (1.651 m)   Wt 178 lb (80.7 kg)   SpO2 97%   BMI 29.62 kg/m²       Physical Exam  Vitals and nursing note reviewed. Constitutional:       General: She is not in acute distress. Appearance: Normal appearance. She is overweight. HENT:      Head: Normocephalic. Eyes:      Extraocular Movements: Extraocular movements intact.    Cardiovascular:      Rate and Rhythm: Normal rate and regular rhythm. Heart sounds: Normal heart sounds. Pulmonary:      Effort: Pulmonary effort is normal.      Breath sounds: Normal breath sounds. Musculoskeletal:         General: Normal range of motion. Right lower leg: No edema. Left lower leg: No edema. Skin:     General: Skin is warm and dry. Neurological:      Mental Status: She is alert and oriented to person, place, and time. Psychiatric:         Mood and Affect: Mood normal.         Behavior: Behavior normal.        Assessment & Plan      ICD-10-CM ICD-9-CM    1. Essential hypertension  H41 253.5 METABOLIC PANEL, BASIC      2. Overweight  E66.3 278.02       3. Vaccine refused by patient  Z28.20 V64.09       4. Encounter for screening mammogram for malignant neoplasm of breast  Z12.31 V76.12       5. Asymptomatic postmenopausal state  Z78.0 V49.81 DEXA BONE DENSITY STUDY AXIAL        Diagnoses and all orders for this visit:    1. Essential hypertension  BP is below goal in the office today. Discussed the importance of home BP monitoring. Purchase a cuff and check home BP readings at least weekly. Check BP readings 1 to 2 hours after taking medication and after sitting quietly for 5 minutes with both feet flat on the floor and arm at heart level. Call if readings are consistently greater than 150/90 on either number. Increase regular exercise, limit salt intake, and stay well-hydrated. -     METABOLIC PANEL, BASIC    2. Overweight  Praised for positive lifestyle changes resulting in weight loss. 3. Vaccine refused by patient  Declines all vaccines. Understands risks. 4. Encounter for screening mammogram for malignant neoplasm of breast  We will request most recent mammogram for review. 5. Asymptomatic postmenopausal state  -     DEXA BONE DENSITY STUDY AXIAL; Future        Aspects of this note may have been generated using voice recognition software. Despite editing, there may be some syntax errors.     Follow-up and Dispositions    Return in about 6 months (around 10/26/2023) for MCW, fasting labs, follow up, chronic conditions/meds. I have discussed the diagnosis with the patient and the intended plan as seen in the above orders. The patient has received an after-visit summary and questions were answered concerning future plans. I have discussed medication side effects and warnings with the patient as well.     Edwige Fernandez NP

## 2023-04-27 ENCOUNTER — TRANSCRIBE ORDERS (OUTPATIENT)
Facility: HOSPITAL | Age: 73
End: 2023-04-27

## 2023-04-27 DIAGNOSIS — Z78.0 ASYMPTOMATIC POSTMENOPAUSAL STATE: Primary | ICD-10-CM

## 2023-04-27 LAB
BUN SERPL-MCNC: 21 MG/DL (ref 8–27)
BUN/CREAT SERPL: 28 (ref 12–28)
CALCIUM SERPL-MCNC: 9.5 MG/DL (ref 8.7–10.3)
CHLORIDE SERPL-SCNC: 98 MMOL/L (ref 96–106)
CO2 SERPL-SCNC: 26 MMOL/L (ref 20–29)
CREAT SERPL-MCNC: 0.74 MG/DL (ref 0.57–1)
EGFRCR SERPLBLD CKD-EPI 2021: 85 ML/MIN/1.73
GLUCOSE SERPL-MCNC: 96 MG/DL (ref 70–99)
POTASSIUM SERPL-SCNC: 4.1 MMOL/L (ref 3.5–5.2)
SODIUM SERPL-SCNC: 138 MMOL/L (ref 134–144)

## 2023-05-01 RX ORDER — HYDROCHLOROTHIAZIDE 25 MG/1
TABLET ORAL
Qty: 45 TABLET | Refills: 1 | Status: SHIPPED | OUTPATIENT
Start: 2023-05-01

## 2023-05-10 ENCOUNTER — HOSPITAL ENCOUNTER (OUTPATIENT)
Facility: HOSPITAL | Age: 73
Discharge: HOME | End: 2023-05-13
Payer: COMMERCIAL

## 2023-05-10 DIAGNOSIS — Z78.0 ASYMPTOMATIC POSTMENOPAUSAL STATE: ICD-10-CM

## 2023-05-10 PROCEDURE — 77080 DXA BONE DENSITY AXIAL: CPT

## 2023-05-11 ENCOUNTER — TELEPHONE (OUTPATIENT)
Facility: CLINIC | Age: 73
End: 2023-05-11

## 2023-05-11 NOTE — TELEPHONE ENCOUNTER
Patient is returning your call please call her back and if she does not  you can leave the results on the message

## 2023-07-24 NOTE — TELEPHONE ENCOUNTER
Acute Care - Occupational Therapy Discharge Summary  [facility]     Patient Name: Cheri Ely  : 1942  MRN: [id number]    Today's Date: 2019  Onset of Illness/Injury or Date of Surgery: 19    Date of Referral to OT: 19  Referring Physician: Dr. Camacho      Admit Date: 2019        OT Recommendation and Plan    Visit Dx:    ICD-10-CM ICD-9-CM   1. Displaced intertrochanteric fracture of right femur, initial encounter for closed fracture (CMS/[facility]) S72.141A 820.21   2. Closed displaced intertrochanteric fracture of right femur, initial encounter (CMS/[facility]) S72.141A 820.21   3. Impaired mobility Z74.09 799.89   4. Impaired mobility and ADLs Z74.09 799.89               Rehab Goal Summary     Row Name 19 0700             Dressing Goal 1 (OT)    Activity/Assistive Device (Dressing Goal 1, OT)  lower body dressing  -TS      [place]/Cues Needed (Dressing Goal 1, OT)  moderate assist (50-74% patient effort)  -TS      Time Frame (Dressing Goal 1, OT)  10 days  -TS      Progress/Outcome (Dressing Goal 1, OT)  goal not met  -TS         Toileting Goal 1 (OT)    Activity/Device (Toileting Goal 1, OT)  toileting skills, all;commode, bedside with drop arms  -TS      [place] Level/Cues Needed (Toileting Goal 1, OT)  minimum assist (75% or more patient effort);verbal cues required  -TS      Time Frame (Toileting Goal 1, OT)  10 days  -TS      Progress/Outcome (Toileting Goal 1, OT)  goal not met  -TS         Grooming Goal 1 (OT)    Activity/Device (Grooming Goal 1, OT)  grooming skills, all  -TS      [place] (Grooming Goal 1, OT)  conditional independence;set-up required  -TS      Time Frame (Grooming Goal 1, OT)  10 days  -TS      Progress/Outcome (Grooming Goal 1, OT)  goal not met  -TS        User Key  (r) = Recorded By, (t) = Taken By, (c) = Cosigned By    Initials Name Provider Type Discipline    TS Nelly Law, CAMPA/L Occupational Therapy Assistant OT          Outcome  Pt needs a refill of hydroCHLOROthiazide (HYDRODIURIL) 25 MG tablet  And instructions should be take 1 tablet a day to make sure pt is taking 25 mg a day - needs a 90 day supply    Send over to [facility] at [address]    Questions call pt at [phone] Measures     Row Name 02/10/19 1300 02/09/19 1007 02/09/19 1000       How much help from another person do you currently need...    Turning from your back to your side while in flat bed without using bedrails?  2  -CW  --  2  -LH    Moving from lying on back to sitting on the side of a flat bed without bedrails?  2  -CW  --  2  -LH    Moving to and from a bed to a chair (including a wheelchair)?  2  -CW  --  2  -LH    Standing up from a chair using your arms (e.g., wheelchair, bedside chair)?  2  -CW  --  2  -LH    Climbing 3-5 steps with a railing?  1  -CW  --  1  -LH    To walk in hospital room?  1  -CW  --  2  -LH    AM-PAC 6 Clicks Score  10  -CW  --  11  -LH       How much help from another is currently needed...    Putting on and taking off regular lower body clothing?  --  1  -MM  --    Bathing (including washing, rinsing, and drying)  --  2  -MM  --    Toileting (which includes using toilet bed pan or urinal)  --  2  -MM  --    Putting on and taking off regular upper body clothing  --  3  -MM  --    Taking care of personal grooming (such as brushing teeth)  --  3  -MM  --    Eating meals  --  3  -MM  --    Score  --  14  -MM  --       Functional Assessment    Outcome Measure Options  AM-PAC 6 Clicks Basic Mobility (PT)  -  AM-PAC 6 Clicks Daily Activity (OT)  -MM  AM-PAC 6 Clicks Basic Mobility (PT)  -      User Key  (r) = Recorded By, (t) = Taken By, (c) = Cosigned By    Initials Name Provider Type     Espinoza Cole, PT Physical Therapist    CW Casandra Crooks, PTA Physical Therapy Assistant    MM Ángel Rand, OTR/L Occupational Therapist          Therapy Suggested Charges     Code   Minutes Charges    None                 OT Discharge Summary  Reason for Discharge: Discharge from facility  Outcomes Achieved: Refer to plan of care for updates on goals achieved  Discharge Destination: [facility]      EMMANUEL Johnson  2/12/2019

## 2023-07-25 RX ORDER — ATENOLOL 100 MG/1
TABLET ORAL
Qty: 90 TABLET | Refills: 1 | Status: SHIPPED | OUTPATIENT
Start: 2023-07-25

## 2023-07-25 RX ORDER — HYDROCHLOROTHIAZIDE 25 MG/1
25 TABLET ORAL DAILY
Qty: 90 TABLET | Refills: 1 | Status: SHIPPED | OUTPATIENT
Start: 2023-07-25

## 2023-07-25 RX ORDER — HYDROCHLOROTHIAZIDE 25 MG/1
TABLET ORAL
Qty: 45 TABLET | Refills: 1 | OUTPATIENT
Start: 2023-07-25

## 2023-10-26 ENCOUNTER — OFFICE VISIT (OUTPATIENT)
Facility: CLINIC | Age: 73
End: 2023-10-26
Payer: COMMERCIAL

## 2023-10-26 VITALS
SYSTOLIC BLOOD PRESSURE: 128 MMHG | BODY MASS INDEX: 29.66 KG/M2 | OXYGEN SATURATION: 99 % | TEMPERATURE: 97.7 F | DIASTOLIC BLOOD PRESSURE: 82 MMHG | HEIGHT: 65 IN | HEART RATE: 64 BPM | WEIGHT: 178 LBS

## 2023-10-26 DIAGNOSIS — E66.3 OVERWEIGHT WITH BODY MASS INDEX (BMI) OF 29 TO 29.9 IN ADULT: ICD-10-CM

## 2023-10-26 DIAGNOSIS — Z00.00 WELLNESS EXAMINATION: Primary | ICD-10-CM

## 2023-10-26 DIAGNOSIS — E03.9 ACQUIRED HYPOTHYROIDISM: ICD-10-CM

## 2023-10-26 DIAGNOSIS — Z28.20 VACCINE REFUSED BY PATIENT: ICD-10-CM

## 2023-10-26 DIAGNOSIS — I10 ESSENTIAL HYPERTENSION: ICD-10-CM

## 2023-10-26 DIAGNOSIS — Z12.31 BREAST CANCER SCREENING BY MAMMOGRAM: ICD-10-CM

## 2023-10-26 DIAGNOSIS — Z23 ENCOUNTER FOR IMMUNIZATION: ICD-10-CM

## 2023-10-26 DIAGNOSIS — E55.9 VITAMIN D DEFICIENCY: ICD-10-CM

## 2023-10-26 DIAGNOSIS — E78.00 PURE HYPERCHOLESTEROLEMIA: ICD-10-CM

## 2023-10-26 PROCEDURE — 99214 OFFICE O/P EST MOD 30 MIN: CPT | Performed by: NURSE PRACTITIONER

## 2023-10-26 PROCEDURE — 90694 VACC AIIV4 NO PRSRV 0.5ML IM: CPT | Performed by: NURSE PRACTITIONER

## 2023-10-26 PROCEDURE — 90471 IMMUNIZATION ADMIN: CPT | Performed by: NURSE PRACTITIONER

## 2023-10-26 PROCEDURE — 3074F SYST BP LT 130 MM HG: CPT | Performed by: NURSE PRACTITIONER

## 2023-10-26 PROCEDURE — 99397 PER PM REEVAL EST PAT 65+ YR: CPT | Performed by: NURSE PRACTITIONER

## 2023-10-26 PROCEDURE — 3079F DIAST BP 80-89 MM HG: CPT | Performed by: NURSE PRACTITIONER

## 2023-10-26 SDOH — ECONOMIC STABILITY: FOOD INSECURITY: WITHIN THE PAST 12 MONTHS, THE FOOD YOU BOUGHT JUST DIDN'T LAST AND YOU DIDN'T HAVE MONEY TO GET MORE.: NEVER TRUE

## 2023-10-26 SDOH — ECONOMIC STABILITY: HOUSING INSECURITY
IN THE LAST 12 MONTHS, WAS THERE A TIME WHEN YOU DID NOT HAVE A STEADY PLACE TO SLEEP OR SLEPT IN A SHELTER (INCLUDING NOW)?: NO

## 2023-10-26 SDOH — ECONOMIC STABILITY: INCOME INSECURITY: HOW HARD IS IT FOR YOU TO PAY FOR THE VERY BASICS LIKE FOOD, HOUSING, MEDICAL CARE, AND HEATING?: NOT HARD AT ALL

## 2023-10-26 SDOH — ECONOMIC STABILITY: FOOD INSECURITY: WITHIN THE PAST 12 MONTHS, YOU WORRIED THAT YOUR FOOD WOULD RUN OUT BEFORE YOU GOT MONEY TO BUY MORE.: NEVER TRUE

## 2023-10-26 ASSESSMENT — ENCOUNTER SYMPTOMS
COUGH: 0
TROUBLE SWALLOWING: 0
CONSTIPATION: 0
WHEEZING: 0
SHORTNESS OF BREATH: 0
ABDOMINAL PAIN: 0
DIARRHEA: 0

## 2023-10-26 NOTE — PROGRESS NOTES
Subjective  Chief Complaint   Patient presents with    Annual Exam     HPI:  Chuck Edmonds is a 75 y.o. female. Patient presents for wellness, and management of hypertension, hyperlipidemia, and hypothyroidism. Understands this is considered two appointments and there may be a copay for the chronic disease management portion of the visit. For chronic disease management:  Medications reviewed, taking as prescribed with no known side effects. Manages HLD with diet and exercise. Thyroid medication daily in am, alone, and with water. Receives vit D and calcium daily in MVI. Follows with gynecology for management of Estradiol. Management of HTN-  Current meds: Atenolol and HCTZ  Home BP readings: does not have a cuff  Denies lightheadedness, dizziness, headaches, chest pain, palpitations, and lower extremity edema.     For wellness:  Immunizations:  Flu: due this fall, ordered  COVID: Declines  Tetanus: Declines  Shingrix: Declines  Prevnar 20: Declines  RSV: Declines    HCV screen: Complete  LMP: Hysterectomy  Pap: No longer indicated based on age  Mammo: 2023- VPfW  Dexa: 2023  Colon cancer screenin2021  Smoking status: Former  Low dose CT scan: not indicated    Moods: at goal  PHQ2: 0/2  Diet: trying to eat healthy  Exercise: stays active  Vision exams: annual  Dental exams: every 6 months    Past Medical History:   Diagnosis Date    Acquired hypothyroidism     Arthritis     Essential hypertension     History of mammogram 2021    History of Papanicolaou smear of cervix 2021    Pure hypercholesterolemia     Vitamin D deficiency     Wears contact lenses      Family History   Problem Relation Age of Onset    Cancer Brother         stomach    Rheum Arthritis Father     Colon Cancer Father     Hypertension Mother     Breast Cancer Mother 80    Hypertension Father      Social History     Socioeconomic History    Marital status:      Spouse name: Not on file    Number of children: Not on

## 2023-10-27 LAB
25(OH)D3+25(OH)D2 SERPL-MCNC: 41.7 NG/ML (ref 30–100)
ALBUMIN SERPL-MCNC: 4.8 G/DL (ref 3.8–4.8)
ALBUMIN/GLOB SERPL: 2.1 {RATIO} (ref 1.2–2.2)
ALP SERPL-CCNC: 67 IU/L (ref 44–121)
ALT SERPL-CCNC: 17 IU/L (ref 0–32)
AST SERPL-CCNC: 23 IU/L (ref 0–40)
BASOPHILS # BLD AUTO: 0 X10E3/UL (ref 0–0.2)
BASOPHILS NFR BLD AUTO: 0 %
BILIRUB SERPL-MCNC: 0.5 MG/DL (ref 0–1.2)
BUN SERPL-MCNC: 19 MG/DL (ref 8–27)
BUN/CREAT SERPL: 25 (ref 12–28)
CALCIUM SERPL-MCNC: 10 MG/DL (ref 8.7–10.3)
CHLORIDE SERPL-SCNC: 96 MMOL/L (ref 96–106)
CHOLEST SERPL-MCNC: 182 MG/DL (ref 100–199)
CO2 SERPL-SCNC: 25 MMOL/L (ref 20–29)
CREAT SERPL-MCNC: 0.77 MG/DL (ref 0.57–1)
EGFRCR SERPLBLD CKD-EPI 2021: 81 ML/MIN/1.73
EOSINOPHIL # BLD AUTO: 0.1 X10E3/UL (ref 0–0.4)
EOSINOPHIL NFR BLD AUTO: 2 %
ERYTHROCYTE [DISTWIDTH] IN BLOOD BY AUTOMATED COUNT: 14 % (ref 11.7–15.4)
GLOBULIN SER CALC-MCNC: 2.3 G/DL (ref 1.5–4.5)
GLUCOSE SERPL-MCNC: 110 MG/DL (ref 70–99)
HCT VFR BLD AUTO: 45.3 % (ref 34–46.6)
HDLC SERPL-MCNC: 68 MG/DL
HGB BLD-MCNC: 14.5 G/DL (ref 11.1–15.9)
IMM GRANULOCYTES # BLD AUTO: 0 X10E3/UL (ref 0–0.1)
IMM GRANULOCYTES NFR BLD AUTO: 0 %
LDLC SERPL CALC-MCNC: 92 MG/DL (ref 0–99)
LYMPHOCYTES # BLD AUTO: 1.5 X10E3/UL (ref 0.7–3.1)
LYMPHOCYTES NFR BLD AUTO: 23 %
MCH RBC QN AUTO: 27.4 PG (ref 26.6–33)
MCHC RBC AUTO-ENTMCNC: 32 G/DL (ref 31.5–35.7)
MCV RBC AUTO: 86 FL (ref 79–97)
MONOCYTES # BLD AUTO: 0.3 X10E3/UL (ref 0.1–0.9)
MONOCYTES NFR BLD AUTO: 5 %
NEUTROPHILS # BLD AUTO: 4.8 X10E3/UL (ref 1.4–7)
NEUTROPHILS NFR BLD AUTO: 70 %
PLATELET # BLD AUTO: 211 X10E3/UL (ref 150–450)
POTASSIUM SERPL-SCNC: 4.2 MMOL/L (ref 3.5–5.2)
PROT SERPL-MCNC: 7.1 G/DL (ref 6–8.5)
RBC # BLD AUTO: 5.3 X10E6/UL (ref 3.77–5.28)
SODIUM SERPL-SCNC: 141 MMOL/L (ref 134–144)
TRIGL SERPL-MCNC: 127 MG/DL (ref 0–149)
TSH SERPL DL<=0.005 MIU/L-ACNC: 2.33 UIU/ML (ref 0.45–4.5)
VLDLC SERPL CALC-MCNC: 22 MG/DL (ref 5–40)
WBC # BLD AUTO: 6.8 X10E3/UL (ref 3.4–10.8)

## 2023-12-04 ENCOUNTER — TELEPHONE (OUTPATIENT)
Facility: CLINIC | Age: 73
End: 2023-12-04

## 2023-12-04 NOTE — TELEPHONE ENCOUNTER
Pt requesting  A1C be put in. Pt stated she doesn't understand why it was not ordered with the rest of her lab work back in October of this year.

## 2023-12-05 NOTE — TELEPHONE ENCOUNTER
A1c is not indicated as a routine lab. If she would like her A1c checked she will need to schedule a lab visit. Her A1c has been normal in the past despite elevated glucose levels.

## 2023-12-05 NOTE — TELEPHONE ENCOUNTER
MIR    Patient advised of all recommendations. Patient wanted to know why she could not get a glucose meter any longer and advised her that since she is not a diabetic that her insurance would not cover it. She then asked if she could get an order for a wrist BP cuff because she has osteoarthritis in her shoulder, and if she had the order her insurance may cover it. Advised her to contact the insurance first to find out if they will or not and then let us know. She also wanted to let us know that she is may be having surgery done at the beginning of the year on her toe and that provider may need labs and wanted to know if we would just send them what we had and if he wanted an A1C would we order it then. Advised patient that since she is not a diabetic the provider doing the surgery may not need an A1C, also advised her that if she is having a procedure done that she may need to have a preop physical done and that as soon as she finds out when it is going to happen that she needs to contact us then so we can get her scheduled for a preop physical if she needs it. Advised her that if the provider who is performing the surgery says that she needs to have labs done that we will then order at that time. Patient also mentioned that she never received a copy of her lab results in the mail. Offered to mail her another copy and advised her of the steps that are taken for outgoing mail. She stated that she would just wait and see if she needs it for the other provider and would then contact us so she can pick it up.

## 2024-01-10 RX ORDER — HYDROCHLOROTHIAZIDE 25 MG/1
25 TABLET ORAL DAILY
Qty: 90 TABLET | Refills: 1 | Status: SHIPPED | OUTPATIENT
Start: 2024-01-10

## 2024-01-10 RX ORDER — LEVOTHYROXINE SODIUM 50 MCG
50 TABLET ORAL
Qty: 90 TABLET | Refills: 2 | Status: SHIPPED | OUTPATIENT
Start: 2024-01-10

## 2024-01-10 RX ORDER — ATENOLOL 100 MG/1
TABLET ORAL
Qty: 90 TABLET | Refills: 1 | Status: SHIPPED | OUTPATIENT
Start: 2024-01-10

## 2024-02-20 ENCOUNTER — OFFICE VISIT (OUTPATIENT)
Age: 74
End: 2024-02-20
Payer: COMMERCIAL

## 2024-02-20 VITALS — BODY MASS INDEX: 29.62 KG/M2 | HEIGHT: 65 IN

## 2024-02-20 DIAGNOSIS — M75.42 IMPINGEMENT SYNDROME OF LEFT SHOULDER: ICD-10-CM

## 2024-02-20 DIAGNOSIS — M75.41 IMPINGEMENT SYNDROME OF RIGHT SHOULDER: ICD-10-CM

## 2024-02-20 DIAGNOSIS — M16.0 BILATERAL PRIMARY OSTEOARTHRITIS OF HIP: Primary | ICD-10-CM

## 2024-02-20 PROCEDURE — 1123F ACP DISCUSS/DSCN MKR DOCD: CPT | Performed by: ORTHOPAEDIC SURGERY

## 2024-02-20 PROCEDURE — 99213 OFFICE O/P EST LOW 20 MIN: CPT | Performed by: ORTHOPAEDIC SURGERY

## 2024-02-20 ASSESSMENT — PATIENT HEALTH QUESTIONNAIRE - PHQ9
2. FEELING DOWN, DEPRESSED OR HOPELESS: 0
SUM OF ALL RESPONSES TO PHQ QUESTIONS 1-9: 0
SUM OF ALL RESPONSES TO PHQ QUESTIONS 1-9: 0
1. LITTLE INTEREST OR PLEASURE IN DOING THINGS: 0
SUM OF ALL RESPONSES TO PHQ9 QUESTIONS 1 & 2: 0
SUM OF ALL RESPONSES TO PHQ QUESTIONS 1-9: 0
SUM OF ALL RESPONSES TO PHQ QUESTIONS 1-9: 0

## 2024-02-20 NOTE — PROGRESS NOTES
Identified pt with two pt identifiers (name and ). Reviewed chart in preparation for visit and have obtained necessary documentation.    Taylor Kimball is a 73 y.o. female Hip Pain and Follow-up (Bilateral Hip Pain )  .    Vitals:    24 1120   Height: 1.651 m (5' 5\")          1. Have you been to the ER, urgent care clinic since your last visit?  Hospitalized since your last visit?  no     2. Have you seen or consulted any other health care providers outside of the [facility] since your last visit?  Include any pap smears or colon screening.  no

## 2024-02-20 NOTE — PROGRESS NOTES
2/20/2024    Chief Complaint: Follow up for bilateral hip replacement    HPI:  is a 73 y.o.  who is now 3 years status post bilateral hip  arthroplasty.  The patient states that they are doing well.  The preoperative pain is gone and the postoperative pain is gone.   Regular exercises have helped with residual symptoms.        Past Medical History:   Diagnosis Date    Acquired hypothyroidism     Arthritis     Essential hypertension     History of mammogram 01/06/2021    History of Papanicolaou smear of cervix 01/2021    Pure hypercholesterolemia     Vitamin D deficiency     Wears contact lenses        Past Surgical History:   Procedure Laterality Date    BREAST BIOPSY Left 1991    COLONOSCOPY N/A 02/24/2021    COLONOSCOPY and EGD performed by Lawrence Bennett MD at [facility] ENDOSCOPY    COLONOSCOPY  11/01/2012    COLONOSCOPY  12/14/2017    HYSTERECTOMY (CERVIX STATUS UNKNOWN)  06/01/1997    LUMBAR LAMINECTOMY  02/27/2020    TOTAL HIP ARTHROPLASTY Bilateral 03/22/2021    UROLOGICAL SURGERY      Uretheral dilatation x 3       Current Outpatient Medications on File Prior to Visit   Medication Sig Dispense Refill    levothyroxine (SYNTHROID) 50 MCG tablet TAKE 1 TABLET BY MOUTH EVERY DAY BEFORE BREAKFAST 90 tablet 2    atenolol (TENORMIN) 100 MG tablet TAKE 1 TABLET BY MOUTH EVERY DAY 90 tablet 1    hydroCHLOROthiazide (HYDRODIURIL) 25 MG tablet TAKE 1 TABLET BY MOUTH EVERY DAY 90 tablet 1    estradiol (VIVELLE) 0.0375 MG/24HR Place 1 patch onto the skin       No current facility-administered medications on file prior to visit.       Allergies   Allergen Reactions    Nitrofurantoin Nausea And Vomiting       Family History   Problem Relation Age of Onset    Cancer Brother         stomach    Rheum Arthritis Father     Colon Cancer Father     Hypertension Mother     Breast Cancer Mother 82    Hypertension Father        Social History     Socioeconomic History    Marital status:      Spouse name: None    Number of

## 2024-02-26 ENCOUNTER — TELEPHONE (OUTPATIENT)
Facility: CLINIC | Age: 74
End: 2024-02-26

## 2024-02-26 NOTE — TELEPHONE ENCOUNTER
Pt called in regards to needing a pre-op on 03/11- 03/13 for upcoming foot surgery on 03/15/2024. Pt had an original pre-op scheduled 03/06/2024 and stated that would not work and needs to know as soon as possible if she can be worked in on any of those days.

## 2024-02-27 ENCOUNTER — TELEPHONE (OUTPATIENT)
Facility: CLINIC | Age: 74
End: 2024-02-27

## 2024-02-27 NOTE — TELEPHONE ENCOUNTER
Patient called in regards to a pre-op appointment she has coming up on 3/6/24 with [facility]. She says she may not be able to do that due to them changing her surgery date to the 15th. She also stated they want her clearance the week of the surgery. Please call patient back with questions or information at [phone].

## 2024-02-27 NOTE — TELEPHONE ENCOUNTER
Spoke to Winsome with [facility] who schedules the surgeries, she stated that the appt scheduled for 3/6/24 is fine.  They do like patient's to have their preop done 7 days prior to surgery.  Advised that we have patient scheduled for 3/6/24 is that ok and she stated that this is fine.    Patient advised.

## 2024-02-27 NOTE — TELEPHONE ENCOUNTER
Spoke to Winsome with [facility] who schedules the surgeries, she stated that the appt scheduled for 3/6/24 is fine.  They do like patient's to have their preop done 7 days prior to surgery.  Advised that we have patient scheduled for 3/6/24 is that ok and she stated that this is fine.    Patient advised.

## 2024-03-06 ENCOUNTER — OFFICE VISIT (OUTPATIENT)
Facility: CLINIC | Age: 74
End: 2024-03-06
Payer: COMMERCIAL

## 2024-03-06 VITALS
HEIGHT: 65 IN | WEIGHT: 183.4 LBS | OXYGEN SATURATION: 97 % | SYSTOLIC BLOOD PRESSURE: 122 MMHG | BODY MASS INDEX: 30.56 KG/M2 | HEART RATE: 70 BPM | TEMPERATURE: 97.5 F | DIASTOLIC BLOOD PRESSURE: 78 MMHG

## 2024-03-06 DIAGNOSIS — E03.9 ACQUIRED HYPOTHYROIDISM: ICD-10-CM

## 2024-03-06 DIAGNOSIS — M77.50 BONE SPUR OF FOOT: ICD-10-CM

## 2024-03-06 DIAGNOSIS — Z01.818 PRE-OP EVALUATION: Primary | ICD-10-CM

## 2024-03-06 DIAGNOSIS — I10 ESSENTIAL HYPERTENSION: ICD-10-CM

## 2024-03-06 PROCEDURE — 1123F ACP DISCUSS/DSCN MKR DOCD: CPT | Performed by: FAMILY MEDICINE

## 2024-03-06 PROCEDURE — 99214 OFFICE O/P EST MOD 30 MIN: CPT | Performed by: FAMILY MEDICINE

## 2024-03-06 PROCEDURE — 3078F DIAST BP <80 MM HG: CPT | Performed by: FAMILY MEDICINE

## 2024-03-06 PROCEDURE — 3074F SYST BP LT 130 MM HG: CPT | Performed by: FAMILY MEDICINE

## 2024-03-06 NOTE — PROGRESS NOTES
[facility]      Taylor Kimball is a 74 y.o. female who present for pre-operative evaluation.    Surgeon: Dr. Avery Thayer  Surgery: Bone spur removal of R great toe  Anesthesia type: General  Indication: Bone spur with pain  Date of Surgery: 3/15/24     Latex allergy: No     Surgical risk: Intermediate  Low: Cataract, breast, dental, endoscopy  Intermediate: Orthopedic, abdominal, thoracic, carotid endarterctomy, prostate  High: Vascular, aortic, emergent, high risk of blood loss     Patient risks:   Age: 74 y.o.  Abnormal EKG: Yes had EKG in 3/2021 with NSR, incomplete RBBB, non-specific ST and T wave abnormality  Obesity: Yes Body mass index is 30.52 kg/m².     CAD: No  MI in past 6 weeks: No  Chest pain or exertional dyspnea: No  Heart rhythm problems: No  Syncope: No  Heart valve problems: No  CHF: No     Diagnosed diabetes: No No results found for: \"HBA1C\", \"SNE7SYER\"  H/o CVA: No  Kidney disease: No  Screening for ETOH use: No  Smoking status: Former  Drug Use: No     Functional assessment:   Low functional capacity (< 4 METS): No     Personal or FH of bleeding problems: No  Personal or FH of blood clots: No  Personal or FH of anesthesia problems: No     Pulmonary Risk:  Asthma or COPD: No  Surgery close to diaphragm: No  Known NADIA: No      ROS:  Per HPI    PE:  /78 (Site: Left Upper Arm, Position: Sitting, Cuff Size: Medium Adult)   Pulse 70   Temp 97.5 °F (36.4 °C) (Temporal)   Ht 1.651 m (5' 5\")   Wt 83.2 kg (183 lb 6.4 oz)   SpO2 97%   BMI 30.52 kg/m²   Gen: Pt sitting in chair, in NAD  Head: Normocephalic, atraumatic  Eyes: Sclera anicteric, EOM grossly intact, PERRL  Ears: TM's pearly with good light reflex b/l  Nose: Normal nasal mucosa  Throat: MMM, normal lips, tongue, teeth and gums  Neck: Supple, no LAD, no thyromegaly or carotid bruits  CVS: Normal S1, S2, no m/r/g  Abd: Soft, non-tender, non-distended. Normal BS  Resp: CTAB, no wheezes or rales  Extrem: Atraumatic, no

## 2024-03-06 NOTE — PROGRESS NOTES
Chief Complaint   Patient presents with    Pre-op Exam     /78 (Site: Left Upper Arm, Position: Sitting, Cuff Size: Medium Adult)   Pulse 70   Temp 97.5 °F (36.4 °C) (Temporal)   Ht 1.651 m (5' 5\")   Wt 83.2 kg (183 lb 6.4 oz)   SpO2 97%   BMI 30.52 kg/m²       \"Have you been to the ER, urgent care clinic since your last visit?  Hospitalized since your last visit?\"    NO    “Have you seen or consulted any other health care providers outside of [facility] since your last visit?”    NO

## 2024-03-08 ENCOUNTER — TELEPHONE (OUTPATIENT)
Facility: CLINIC | Age: 74
End: 2024-03-08

## 2024-03-08 NOTE — TELEPHONE ENCOUNTER
Yazmin from [facility] called on behalf of patient. She stated patient is having a procedure done with them on 3/15/24 and they need her last office notes and labs faxed to them.    Phone number: [phone]  Fax number: [phone]

## 2024-07-15 RX ORDER — ATENOLOL 100 MG/1
TABLET ORAL
Qty: 90 TABLET | Refills: 1 | Status: SHIPPED | OUTPATIENT
Start: 2024-07-15

## 2024-07-15 RX ORDER — HYDROCHLOROTHIAZIDE 25 MG/1
25 TABLET ORAL DAILY
Qty: 90 TABLET | Refills: 1 | Status: SHIPPED | OUTPATIENT
Start: 2024-07-15

## 2024-07-15 NOTE — TELEPHONE ENCOUNTER
Future Appointments  7/15/2024 - 7/15/2026   Date Visit Type Department Provider    10/8/2024 10:30 AM OFFICE VISIT [facility] Rupa Whitaker MD   Appointment Notes:   6 month follow up

## 2024-10-08 ENCOUNTER — OFFICE VISIT (OUTPATIENT)
Facility: CLINIC | Age: 74
End: 2024-10-08
Payer: COMMERCIAL

## 2024-10-08 VITALS
TEMPERATURE: 97.4 F | OXYGEN SATURATION: 97 % | DIASTOLIC BLOOD PRESSURE: 80 MMHG | WEIGHT: 179.8 LBS | SYSTOLIC BLOOD PRESSURE: 117 MMHG | HEIGHT: 65 IN | BODY MASS INDEX: 29.96 KG/M2 | HEART RATE: 64 BPM

## 2024-10-08 DIAGNOSIS — E78.00 PURE HYPERCHOLESTEROLEMIA: ICD-10-CM

## 2024-10-08 DIAGNOSIS — Z23 NEED FOR IMMUNIZATION AGAINST INFLUENZA: ICD-10-CM

## 2024-10-08 DIAGNOSIS — E03.9 ACQUIRED HYPOTHYROIDISM: ICD-10-CM

## 2024-10-08 DIAGNOSIS — Z12.11 SCREENING FOR MALIGNANT NEOPLASM OF COLON: ICD-10-CM

## 2024-10-08 DIAGNOSIS — E55.9 VITAMIN D DEFICIENCY: ICD-10-CM

## 2024-10-08 DIAGNOSIS — I10 ESSENTIAL HYPERTENSION: Primary | ICD-10-CM

## 2024-10-08 PROCEDURE — 90471 IMMUNIZATION ADMIN: CPT | Performed by: FAMILY MEDICINE

## 2024-10-08 PROCEDURE — 1123F ACP DISCUSS/DSCN MKR DOCD: CPT | Performed by: FAMILY MEDICINE

## 2024-10-08 PROCEDURE — 3079F DIAST BP 80-89 MM HG: CPT | Performed by: FAMILY MEDICINE

## 2024-10-08 PROCEDURE — 99214 OFFICE O/P EST MOD 30 MIN: CPT | Performed by: FAMILY MEDICINE

## 2024-10-08 PROCEDURE — 90653 IIV ADJUVANT VACCINE IM: CPT | Performed by: FAMILY MEDICINE

## 2024-10-08 PROCEDURE — 3074F SYST BP LT 130 MM HG: CPT | Performed by: FAMILY MEDICINE

## 2024-10-08 NOTE — PROGRESS NOTES
Chief Complaint   Patient presents with    Follow-up Chronic Condition     \"Have you been to the ER, urgent care clinic since your last visit?  Hospitalized since your last visit?\"    NO    “Have you seen or consulted any other health care providers outside of [facility] since your last visit?”    About 7 months ago had foot surgery out patient with ortho at [facility].     BP (!) 140/86 (Site: Left Upper Arm, Position: Sitting)   Pulse 64   Temp 97.4 °F (36.3 °C) (Temporal)   Ht 1.651 m (5' 5\")   Wt 81.6 kg (179 lb 12.8 oz)   SpO2 97%   BMI 29.92 kg/m²    No data recorded       No questionnaires available.                                  Click Here for Release of Records Request     Identified Patient with 2 Patient Identifiers-Name and

## 2024-10-08 NOTE — PROGRESS NOTES
[facility]      HPI: Pt is a 74 y.o. female who presents for follow-up.     HTN: Takes atenolol and HCTZ without issues. She does not take her HCTZ before leaving the house so has not had it today. She does not check BP at home.     Hypothyroidism: Takes levothyroxine without issues.       Past Medical History:   Diagnosis Date    Acquired hypothyroidism     Arthritis     Essential hypertension     History of mammogram 2021    History of Papanicolaou smear of cervix 2021    Pure hypercholesterolemia     Vitamin D deficiency     Wears contact lenses        Family History   Problem Relation Age of Onset    Cancer Brother         stomach    Rheum Arthritis Father     Colon Cancer Father     Hypertension Mother     Breast Cancer Mother 82    Hypertension Father        Social History     Tobacco Use    Smoking status: Former     Current packs/day: 0.00     Types: Cigarettes     Quit date: 1999     Years since quittin.5    Smokeless tobacco: Never   Substance Use Topics    Alcohol use: Yes    Drug use: Never       ROS:  Per HPI    PE:  /80   Pulse 64   Temp 97.4 °F (36.3 °C) (Temporal)   Ht 1.651 m (5' 5\")   Wt 81.6 kg (179 lb 12.8 oz)   SpO2 97%   BMI 29.92 kg/m²   Gen: Pt sitting in chair, in NAD  Head: Normocephalic, atraumatic  Eyes: Sclera anicteric, EOM grossly intact, PERRL  Nose: Normal nasal mucosa  Throat: MMM, normal lips, tongue, teeth and gums  Neck: Supple, no LAD, no thyromegaly or carotid bruits  CVS: Normal S1, S2  Resp: CTAB, no wheezes or rales  Extrem: Atraumatic, no cyanosis or edema  Pulses: 2+   Skin: Warm, dry  Neuro: Alert, oriented, appropriate      A/P:     ICD-10-CM    1. Essential hypertension  I10 Comprehensive Metabolic Panel     CBC with Auto Differential      2. Acquired hypothyroidism  E03.9 TSH      3. Vitamin D deficiency  E55.9 Vitamin D 25 Hydroxy      4. Pure hypercholesterolemia  E78.00 Lipid Panel      5. Need for immunization against

## 2024-10-09 LAB
25(OH)D3+25(OH)D2 SERPL-MCNC: 51 NG/ML (ref 30–100)
ALBUMIN SERPL-MCNC: 4.4 G/DL (ref 3.8–4.8)
ALP SERPL-CCNC: 63 IU/L (ref 44–121)
ALT SERPL-CCNC: 21 IU/L (ref 0–32)
AST SERPL-CCNC: 45 IU/L (ref 0–40)
BASOPHILS # BLD AUTO: 0 X10E3/UL (ref 0–0.2)
BASOPHILS NFR BLD AUTO: 0 %
BILIRUB SERPL-MCNC: 0.5 MG/DL (ref 0–1.2)
BUN SERPL-MCNC: 21 MG/DL (ref 8–27)
BUN/CREAT SERPL: 24 (ref 12–28)
CALCIUM SERPL-MCNC: 9.7 MG/DL (ref 8.7–10.3)
CHLORIDE SERPL-SCNC: 99 MMOL/L (ref 96–106)
CHOLEST SERPL-MCNC: 186 MG/DL (ref 100–199)
CO2 SERPL-SCNC: 26 MMOL/L (ref 20–29)
CREAT SERPL-MCNC: 0.87 MG/DL (ref 0.57–1)
EGFRCR SERPLBLD CKD-EPI 2021: 69 ML/MIN/1.73
EOSINOPHIL # BLD AUTO: 0.3 X10E3/UL (ref 0–0.4)
EOSINOPHIL NFR BLD AUTO: 4 %
ERYTHROCYTE [DISTWIDTH] IN BLOOD BY AUTOMATED COUNT: 13.9 % (ref 11.7–15.4)
GLOBULIN SER CALC-MCNC: 2.5 G/DL (ref 1.5–4.5)
GLUCOSE SERPL-MCNC: 102 MG/DL (ref 70–99)
HCT VFR BLD AUTO: 45 % (ref 34–46.6)
HDLC SERPL-MCNC: 67 MG/DL
HGB BLD-MCNC: 14.3 G/DL (ref 11.1–15.9)
IMM GRANULOCYTES # BLD AUTO: 0 X10E3/UL (ref 0–0.1)
IMM GRANULOCYTES NFR BLD AUTO: 0 %
LDLC SERPL CALC-MCNC: 94 MG/DL (ref 0–99)
LYMPHOCYTES # BLD AUTO: 1.7 X10E3/UL (ref 0.7–3.1)
LYMPHOCYTES NFR BLD AUTO: 24 %
MCH RBC QN AUTO: 27.3 PG (ref 26.6–33)
MCHC RBC AUTO-ENTMCNC: 31.8 G/DL (ref 31.5–35.7)
MCV RBC AUTO: 86 FL (ref 79–97)
MONOCYTES # BLD AUTO: 0.4 X10E3/UL (ref 0.1–0.9)
MONOCYTES NFR BLD AUTO: 5 %
NEUTROPHILS # BLD AUTO: 4.8 X10E3/UL (ref 1.4–7)
NEUTROPHILS NFR BLD AUTO: 67 %
PLATELET # BLD AUTO: 226 X10E3/UL (ref 150–450)
POTASSIUM SERPL-SCNC: 5 MMOL/L (ref 3.5–5.2)
PROT SERPL-MCNC: 6.9 G/DL (ref 6–8.5)
RBC # BLD AUTO: 5.23 X10E6/UL (ref 3.77–5.28)
SODIUM SERPL-SCNC: 141 MMOL/L (ref 134–144)
TRIGL SERPL-MCNC: 143 MG/DL (ref 0–149)
TSH SERPL DL<=0.005 MIU/L-ACNC: 2.49 UIU/ML (ref 0.45–4.5)
VLDLC SERPL CALC-MCNC: 25 MG/DL (ref 5–40)
WBC # BLD AUTO: 7.1 X10E3/UL (ref 3.4–10.8)

## 2024-10-11 ENCOUNTER — TELEPHONE (OUTPATIENT)
Facility: CLINIC | Age: 74
End: 2024-10-11

## 2024-10-11 NOTE — TELEPHONE ENCOUNTER
Patient is calling about her lab results from 10/08/24 that she had completed---she can be reached at [phone].

## 2024-10-11 NOTE — RESULT ENCOUNTER NOTE
Results and provider's recommendations reviewed with patient.  Patient stated that she sometimes takes 1000mg of Tylenol daily sometimes for 4-5 days in a row but then won't take it again for a while.  Copy of lab results printed and mailed to patient, per her request.

## 2024-10-14 RX ORDER — LEVOTHYROXINE SODIUM 50 MCG
50 TABLET ORAL
Qty: 90 TABLET | Refills: 2 | Status: SHIPPED | OUTPATIENT
Start: 2024-10-14 | End: 2024-10-18 | Stop reason: SDUPTHER

## 2024-10-14 NOTE — TELEPHONE ENCOUNTER
Lab results and recommendations reviewed with patient on 10/11/2024.  Contacted patient to ask if there was any other questions or concerns, she just wanted to be sure it was safe to wait for repeat labs to check liver functions in 6 months, advised that this is ok and to watch her intake of red meats.

## 2024-10-18 ENCOUNTER — TELEPHONE (OUTPATIENT)
Facility: CLINIC | Age: 74
End: 2024-10-18

## 2024-10-18 RX ORDER — LEVOTHYROXINE SODIUM 50 MCG
50 TABLET ORAL
Qty: 90 TABLET | Refills: 2 | Status: SHIPPED | OUTPATIENT
Start: 2024-10-18

## 2024-10-18 NOTE — TELEPHONE ENCOUNTER
Patient is going to  synthroid today. Patient called concern because pricing did not seem right. Patient was told by [facility] that the system did not notify them it is name brand only medications. Pharmacy is asking for the prescription to state name brand only on it. Patient is requesting that medication does not get canceled as they pharmacy is flipping it for this fill, but will need to be corrected before refills.

## 2025-01-13 RX ORDER — ATENOLOL 100 MG/1
TABLET ORAL
Qty: 90 TABLET | Refills: 1 | Status: SHIPPED | OUTPATIENT
Start: 2025-01-13

## 2025-01-13 RX ORDER — HYDROCHLOROTHIAZIDE 25 MG/1
25 TABLET ORAL DAILY
Qty: 90 TABLET | Refills: 1 | Status: SHIPPED | OUTPATIENT
Start: 2025-01-13

## 2025-04-07 ENCOUNTER — TELEPHONE (OUTPATIENT)
Facility: CLINIC | Age: 75
End: 2025-04-07

## 2025-04-07 NOTE — TELEPHONE ENCOUNTER
Patient would like a call back to know if she needs to fast for her appt on 04/10. I let her know that 2:00 pm is a very long time to fast the provider can always get her to come in sometime after her appointment to have her labs done while she may then be fasting but she insisted on hearing back from a nurse because she just wanted to be sure and she said that she would not like to come in on another day to do fasting labs.

## 2025-04-07 NOTE — TELEPHONE ENCOUNTER
Called and talked with patient. Appointment set up for 04/10/2025 at 2pm. Patient would like to know if patient needs to be fasting for blood work. Patient stated that Lipid panel is not done until October. Per last labs MWE wanted to follow up on CMP, patient would like to hear it from the Provider if she should be fasting or not.

## 2025-04-09 ENCOUNTER — TELEPHONE (OUTPATIENT)
Facility: CLINIC | Age: 75
End: 2025-04-09

## 2025-04-09 NOTE — TELEPHONE ENCOUNTER
Patient returned call regarding upcoming Medicare wellness appointment and completion of HRA questionnaire. Patient has Medicare Part A only and does not want to complete Medicare.

## 2025-04-09 NOTE — TELEPHONE ENCOUNTER
Patient returned call regarding upcoming Medicare wellness appointment and completion of HRA questionnaire. Questionnaire completed via phone.

## 2025-04-09 NOTE — TELEPHONE ENCOUNTER
Attempted to contact patient regarding upcoming Medicare wellness appointment and completion of HRA questionnaire. LVM for patient to please return call at [phone].

## 2025-04-10 ENCOUNTER — OFFICE VISIT (OUTPATIENT)
Facility: CLINIC | Age: 75
End: 2025-04-10
Payer: COMMERCIAL

## 2025-04-10 VITALS
RESPIRATION RATE: 16 BRPM | BODY MASS INDEX: 29.99 KG/M2 | HEIGHT: 65 IN | HEART RATE: 64 BPM | DIASTOLIC BLOOD PRESSURE: 86 MMHG | SYSTOLIC BLOOD PRESSURE: 124 MMHG | WEIGHT: 180 LBS | TEMPERATURE: 97.3 F | OXYGEN SATURATION: 97 %

## 2025-04-10 DIAGNOSIS — R73.01 ELEVATED FASTING GLUCOSE: ICD-10-CM

## 2025-04-10 DIAGNOSIS — R79.89 ELEVATED LFTS: ICD-10-CM

## 2025-04-10 DIAGNOSIS — I10 ESSENTIAL HYPERTENSION: ICD-10-CM

## 2025-04-10 DIAGNOSIS — Z00.00 ENCOUNTER FOR ANNUAL PHYSICAL EXAM: Primary | ICD-10-CM

## 2025-04-10 DIAGNOSIS — E03.9 ACQUIRED HYPOTHYROIDISM: ICD-10-CM

## 2025-04-10 PROCEDURE — 3079F DIAST BP 80-89 MM HG: CPT

## 2025-04-10 PROCEDURE — 3074F SYST BP LT 130 MM HG: CPT

## 2025-04-10 PROCEDURE — 1159F MED LIST DOCD IN RCRD: CPT

## 2025-04-10 PROCEDURE — 99397 PER PM REEVAL EST PAT 65+ YR: CPT

## 2025-04-10 PROCEDURE — 99213 OFFICE O/P EST LOW 20 MIN: CPT

## 2025-04-10 PROCEDURE — 1126F AMNT PAIN NOTED NONE PRSNT: CPT

## 2025-04-10 RX ORDER — LEVOTHYROXINE SODIUM 50 MCG
50 TABLET ORAL
Qty: 90 TABLET | Refills: 3 | Status: SHIPPED | OUTPATIENT
Start: 2025-04-10

## 2025-04-10 SDOH — ECONOMIC STABILITY: FOOD INSECURITY: WITHIN THE PAST 12 MONTHS, YOU WORRIED THAT YOUR FOOD WOULD RUN OUT BEFORE YOU GOT MONEY TO BUY MORE.: NEVER TRUE

## 2025-04-10 SDOH — ECONOMIC STABILITY: FOOD INSECURITY: WITHIN THE PAST 12 MONTHS, THE FOOD YOU BOUGHT JUST DIDN'T LAST AND YOU DIDN'T HAVE MONEY TO GET MORE.: NEVER TRUE

## 2025-04-10 ASSESSMENT — PATIENT HEALTH QUESTIONNAIRE - PHQ9
SUM OF ALL RESPONSES TO PHQ QUESTIONS 1-9: 2
2. FEELING DOWN, DEPRESSED OR HOPELESS: MORE THAN HALF THE DAYS
1. LITTLE INTEREST OR PLEASURE IN DOING THINGS: NOT AT ALL

## 2025-04-10 ASSESSMENT — ENCOUNTER SYMPTOMS
WHEEZING: 0
SHORTNESS OF BREATH: 0
ABDOMINAL PAIN: 0
CHEST TIGHTNESS: 0

## 2025-04-10 NOTE — PROGRESS NOTES
The patient, Taylor Kimball, identity was verified by name and MRN.  Chief Complaint   Patient presents with    Annual Exam     No LMP recorded. Patient has had a hysterectomy.  /86   Pulse 64   Temp 97.3 °F (36.3 °C) (Temporal)   Resp 16   Ht 1.651 m (5' 5\")   Wt 81.6 kg (180 lb)   SpO2 97%   BMI 29.95 kg/m²       4/10/2025     1:55 PM   Amb Fall Risk Assessment and TUG Test   Do you feel unsteady or are you worried about falling?  no   2 or more falls in past year? no   Fall with injury in past year? no   PHQ-9 Total Score: 2 (4/10/2025  1:55 PM)        \"Have you been to the ER, urgent care clinic since your last visit?  Hospitalized since your last visit?\"    NO    “Have you seen or consulted any other health care providers outside our system since your last visit?”    NO             Social History     Substance and Sexual Activity   Sexual Activity Not Currently     Medication list reviewed and active medications noted. Patient is taking medications as directed.  See documentation in medication activity.  Allergies: allergy list reviewed, no new allergies added    CARETEAM:  VPFW-Mammograms and Pap  Needs referral for Gastro Hx of colon cancer with family    Chaperone: offered, declined  Alana Henderson LPN

## 2025-04-10 NOTE — ASSESSMENT & PLAN NOTE
New, uncertain prognosis, continue current plan pending work up below  - Discussed lifestyle modifications.

## 2025-04-10 NOTE — PROGRESS NOTES
Annual Physical    SUBJECTIVE/OBJECTIVE:    Taylor Kimball is a 75 y.o. female who is on the schedule today for annual wellness exam and to follow up on some chronic issues. Discussed with her combining chronic condition follow up and annual exam at future visits.    1. Encounter for annual physical exam  For the wellness:  Flu vaccine- Outside of season  COVID vaccine- Encouraged patient to get this at the pharmacy  Tetanus- Tdap overdue, encouraged patient to get this at the pharmacy  Shingrix- Complete  Prevnar 20 OR PCV15 then Pneumovax 23 1 yr later- Encouraged patient to get this at the pharmacy   Mammogram/Family Hx of Breast or Ovarian CA?- Last done 2024, ordered by OB/Gyn  Dexascan- Last done , declines repeat  Colon cancer screening- Last done   Smoking status-   Tobacco Use      Smoking status: Former        Packs/day: 0.00        Types: Cigarettes        Quit date: 1999        Years since quittin.0      Smokeless tobacco: Never     Low dose CT scan- 26 years since quitting  ASCVD- The 10-year ASCVD risk score (Yue POST, et al., 2019) is: 22.1%   Exercise- Patient wants to walk but states she doesn't feel safe in her neighborhood or the gym. She will sometimes do exercises at the gym  Diet- Patient states she lost eight pounds since the end of January. She is concerned about fat gathering around her middle. She has been cutting down on bread and simple carbs. She has cut down on beef as well because of history of fatty liver and eats more ground turkey.   Dentist- Went 2025  Eye Exam- Went last month, wears contact  Skin Check- Sees Dermatology, goes again in September  Hearing Exam- Denies changes in hearing.      4/10/2025     1:55 PM 2024    11:22 AM   PHQ Scores   PHQ2 Score 2 0   PHQ9 Score 2 0       Interpretation of Total Score Depression Severity: 1-4 = Minimal depression, 5-9 = Mild depression, 10-14 = Moderate depression, 15-19 = Moderately severe

## 2025-04-11 LAB
ALBUMIN SERPL-MCNC: 4.8 G/DL (ref 3.8–4.8)
ALP SERPL-CCNC: 64 IU/L (ref 44–121)
ALT SERPL-CCNC: 18 IU/L (ref 0–32)
AST SERPL-CCNC: 28 IU/L (ref 0–40)
BILIRUB DIRECT SERPL-MCNC: 0.17 MG/DL (ref 0–0.4)
BILIRUB SERPL-MCNC: 0.5 MG/DL (ref 0–1.2)
HBA1C MFR BLD: 5.7 % (ref 4.8–5.6)
PROT SERPL-MCNC: 7.2 G/DL (ref 6–8.5)

## 2025-04-18 ENCOUNTER — RESULTS FOLLOW-UP (OUTPATIENT)
Facility: CLINIC | Age: 75
End: 2025-04-18

## 2025-04-18 ENCOUNTER — TELEPHONE (OUTPATIENT)
Facility: CLINIC | Age: 75
End: 2025-04-18

## 2025-04-18 NOTE — TELEPHONE ENCOUNTER
Patient called requesting a call back about her lab results. Patient stated if she is unavailable that it is ok to leave a message on her VM with the information.

## 2025-07-03 RX ORDER — HYDROCHLOROTHIAZIDE 25 MG/1
25 TABLET ORAL DAILY
Qty: 90 TABLET | Refills: 1 | Status: SHIPPED | OUTPATIENT
Start: 2025-07-03

## 2025-07-03 RX ORDER — ATENOLOL 100 MG/1
100 TABLET ORAL DAILY
Qty: 90 TABLET | Refills: 1 | Status: SHIPPED | OUTPATIENT
Start: 2025-07-03

## 2025-08-05 ENCOUNTER — TELEPHONE (OUTPATIENT)
Facility: CLINIC | Age: 75
End: 2025-08-05

## (undated) DEVICE — HOOD: Brand: FLYTE

## (undated) DEVICE — ELECTRODE,RADIOTRANSLUCENT,FOAM,3PK: Brand: MEDLINE

## (undated) DEVICE — LABEL MED MRMC ORTH STRL

## (undated) DEVICE — DERMABOND SKIN ADH 0.7ML -- DERMABOND ADVANCED 12/BX

## (undated) DEVICE — INFECTION CONTROL KIT SYS

## (undated) DEVICE — BAG BELONG PT PERS CLEAR HANDL

## (undated) DEVICE — SOL IRR STRL H2O 1000ML BTL --

## (undated) DEVICE — Device

## (undated) DEVICE — SOLUTION SURG PREP 26 CC PURPREP

## (undated) DEVICE — SHEET, DRAPE, SPLIT, STERILE: Brand: MEDLINE

## (undated) DEVICE — YANKAUER,SMOOTH HANDLE,HIGH CAPACITY: Brand: MEDLINE INDUSTRIES, INC.

## (undated) DEVICE — CONTAINER SPEC 20 ML LID NEUT BUFF FORMALIN 10 % POLYPR STS

## (undated) DEVICE — CATH IV AUTOGRD BC PNK 20GA 25 -- INSYTE

## (undated) DEVICE — BAG SPEC BIOHZRD 10 X 10 IN --

## (undated) DEVICE — STERILE POLYISOPRENE POWDER-FREE SURGICAL GLOVES: Brand: PROTEXIS

## (undated) DEVICE — ELECTRODE BLDE L4IN NONINSULATED EDGE

## (undated) DEVICE — SNAP KOVER: Brand: UNBRANDED

## (undated) DEVICE — KIT POS FOAM HANA TBL

## (undated) DEVICE — SUTURE VCRL SZ 1 L36IN ABSRB UD L36MM CT-1 1/2 CIR J947H

## (undated) DEVICE — SOLIDIFIER MEDC 1200ML -- CONVERT TO 356117

## (undated) DEVICE — 450 ML BOTTLE OF 0.05% CHLORHEXIDINE GLUCONATE IN 99.95% STERILE WATER FOR IRRIGATION, USP AND APPLICATOR.: Brand: IRRISEPT ANTIMICROBIAL WOUND LAVAGE

## (undated) DEVICE — SPONGE LAP 18X18IN STRL -- 5/PK

## (undated) DEVICE — PENCIL SMK EVAC L10FT DIA95MM TBNG NONSTICK W ADPT TO 22MM

## (undated) DEVICE — SOL IRR SOD CL 0.9% 1000ML BTL --

## (undated) DEVICE — STRYKER PERFORMANCE SERIES SAGITTAL BLADE: Brand: STRYKER PERFORMANCE SERIES

## (undated) DEVICE — 3M™ TEGADERM™ TRANSPARENT FILM DRESSING FRAME STYLE, 1626W, 4 IN X 4-3/4 IN (10 CM X 12 CM), 50/CT 4CT/CASE: Brand: 3M™ TEGADERM™

## (undated) DEVICE — ADULT SPO2 SENSOR,REMANUFACTURED,REPROCESSED DEVICE FOR SINGLE USE; REPROCESSED BY COVIDIEN LLC: Brand: NELLCOR

## (undated) DEVICE — DEVICE TRNSF SPIK STL 2008S] MICROTEK MEDICAL INC]

## (undated) DEVICE — BITEBLOCK ENDOSCP 60FR MAXI WHT POLYETH STURDY W/ VELC WVN

## (undated) DEVICE — CANNULA CUSH AD W/ 14FT TBG

## (undated) DEVICE — 3M™ IOBAN™ 2 ANTIMICROBIAL INCISE DRAPE 6650EZ: Brand: IOBAN™ 2

## (undated) DEVICE — SUTURE MCRYL SZ 3-0 L27IN ABSRB UD L24MM PS-1 3/8 CIR PRIM Y936H

## (undated) DEVICE — SYR 50ML LR LCK 1ML GRAD NSAF --

## (undated) DEVICE — 3M™ IOBAN™ 2 ANTIMICROBIAL INCISE DRAPE 6651EZ: Brand: IOBAN™ 2

## (undated) DEVICE — KIT COLON W/ 1.1OZ LUB AND 2 END

## (undated) DEVICE — 1200 GUARD II KIT W/5MM TUBE W/O VAC TUBE: Brand: GUARDIAN

## (undated) DEVICE — FORCEPS BX L240CM JAW DIA2.8MM L CAP W/ NDL MIC MESH TOOTH

## (undated) DEVICE — STERILE POLYISOPRENE POWDER-FREE SURGICAL GLOVES WITH EMOLLIENT COATING: Brand: PROTEXIS

## (undated) DEVICE — REM POLYHESIVE ADULT PATIENT RETURN ELECTRODE: Brand: VALLEYLAB

## (undated) DEVICE — DRAPE,U/ SHT,SPLIT,PLAS,STERIL: Brand: MEDLINE

## (undated) DEVICE — STRIP,CLOSURE,WOUND,MEDI-STRIP,1/2X4: Brand: MEDLINE

## (undated) DEVICE — SET ADMIN 16ML TBNG L100IN 2 Y INJ SITE IV PIGGY BK DISP

## (undated) DEVICE — GARMENT,MEDLINE,DVT,INT,CALF,MED, GEN2: Brand: MEDLINE

## (undated) DEVICE — BRUSH SCRB 4% CHG RED DISP --

## (undated) DEVICE — NEEDLE HYPO 18GA L1.5IN PNK S STL HUB POLYPR SHLD REG BVL

## (undated) DEVICE — SUTURE VCRL SZ 2-0 L36IN ABSRB UD L36MM CT-1 1/2 CIR J945H